# Patient Record
Sex: FEMALE | Race: WHITE | NOT HISPANIC OR LATINO | Employment: OTHER | URBAN - METROPOLITAN AREA
[De-identification: names, ages, dates, MRNs, and addresses within clinical notes are randomized per-mention and may not be internally consistent; named-entity substitution may affect disease eponyms.]

---

## 2020-07-11 ENCOUNTER — LAB REQUISITION (OUTPATIENT)
Dept: LAB | Facility: HOSPITAL | Age: 85
End: 2020-07-11

## 2020-07-11 DIAGNOSIS — Z00.00 ROUTINE GENERAL MEDICAL EXAMINATION AT A HEALTH CARE FACILITY: ICD-10-CM

## 2020-07-11 LAB
ANION GAP SERPL CALCULATED.3IONS-SCNC: 16 MMOL/L (ref 5–15)
BASOPHILS # BLD AUTO: 0.06 10*3/MM3 (ref 0–0.2)
BASOPHILS NFR BLD AUTO: 0.8 % (ref 0–1.5)
BUN SERPL-MCNC: 27 MG/DL (ref 8–23)
BUN/CREAT SERPL: 22.9 (ref 7–25)
CALCIUM SPEC-SCNC: 8.9 MG/DL (ref 8.2–9.6)
CHLORIDE SERPL-SCNC: 88 MMOL/L (ref 98–107)
CO2 SERPL-SCNC: 30 MMOL/L (ref 22–29)
CREAT SERPL-MCNC: 1.18 MG/DL (ref 0.57–1)
DEPRECATED RDW RBC AUTO: 48.5 FL (ref 37–54)
EOSINOPHIL # BLD AUTO: 0.27 10*3/MM3 (ref 0–0.4)
EOSINOPHIL NFR BLD AUTO: 3.5 % (ref 0.3–6.2)
ERYTHROCYTE [DISTWIDTH] IN BLOOD BY AUTOMATED COUNT: 17.6 % (ref 12.3–15.4)
GFR SERPL CREATININE-BSD FRML MDRD: 43 ML/MIN/1.73
GFR SERPL CREATININE-BSD FRML MDRD: 52 ML/MIN/1.73
GLUCOSE SERPL-MCNC: 118 MG/DL (ref 65–99)
HCT VFR BLD AUTO: 45.5 % (ref 34–46.6)
HGB BLD-MCNC: 13.8 G/DL (ref 12–15.9)
IMM GRANULOCYTES # BLD AUTO: 0.02 10*3/MM3 (ref 0–0.05)
IMM GRANULOCYTES NFR BLD AUTO: 0.3 % (ref 0–0.5)
LYMPHOCYTES # BLD AUTO: 1.14 10*3/MM3 (ref 0.7–3.1)
LYMPHOCYTES NFR BLD AUTO: 14.7 % (ref 19.6–45.3)
MCH RBC QN AUTO: 24.5 PG (ref 26.6–33)
MCHC RBC AUTO-ENTMCNC: 30.3 G/DL (ref 31.5–35.7)
MCV RBC AUTO: 80.8 FL (ref 79–97)
MONOCYTES # BLD AUTO: 0.75 10*3/MM3 (ref 0.1–0.9)
MONOCYTES NFR BLD AUTO: 9.7 % (ref 5–12)
NEUTROPHILS NFR BLD AUTO: 5.52 10*3/MM3 (ref 1.7–7)
NEUTROPHILS NFR BLD AUTO: 71 % (ref 42.7–76)
NRBC BLD AUTO-RTO: 0 /100 WBC (ref 0–0.2)
PLATELET # BLD AUTO: 107 10*3/MM3 (ref 140–450)
PMV BLD AUTO: 10.2 FL (ref 6–12)
POTASSIUM SERPL-SCNC: 3.7 MMOL/L (ref 3.5–5.2)
RBC # BLD AUTO: 5.63 10*6/MM3 (ref 3.77–5.28)
SODIUM SERPL-SCNC: 134 MMOL/L (ref 136–145)
WBC # BLD AUTO: 7.76 10*3/MM3 (ref 3.4–10.8)

## 2020-07-11 PROCEDURE — 85025 COMPLETE CBC W/AUTO DIFF WBC: CPT

## 2020-07-11 PROCEDURE — 80048 BASIC METABOLIC PNL TOTAL CA: CPT

## 2022-06-07 ENCOUNTER — HOSPITAL ENCOUNTER (EMERGENCY)
Facility: HOSPITAL | Age: 87
Discharge: HOME | End: 2022-06-08
Attending: EMERGENCY MEDICINE | Admitting: EMERGENCY MEDICINE

## 2022-06-07 ENCOUNTER — APPOINTMENT (OUTPATIENT)
Dept: GENERAL RADIOLOGY | Facility: HOSPITAL | Age: 87
End: 2022-06-07

## 2022-06-07 DIAGNOSIS — E87.70 HYPERVOLEMIA, UNSPECIFIED HYPERVOLEMIA TYPE: ICD-10-CM

## 2022-06-07 DIAGNOSIS — J40 BRONCHITIS: Primary | ICD-10-CM

## 2022-06-07 DIAGNOSIS — R91.1 PULMONARY NODULE: ICD-10-CM

## 2022-06-07 LAB
ALBUMIN SERPL-MCNC: 3.6 G/DL (ref 3.5–5.2)
ALBUMIN/GLOB SERPL: 0.9 G/DL
ALP SERPL-CCNC: 99 U/L (ref 39–117)
ALT SERPL W P-5'-P-CCNC: 10 U/L (ref 1–33)
ANION GAP SERPL CALCULATED.3IONS-SCNC: 8 MMOL/L (ref 5–15)
AST SERPL-CCNC: 22 U/L (ref 1–32)
BASOPHILS # BLD AUTO: 0.07 10*3/MM3 (ref 0–0.2)
BASOPHILS NFR BLD AUTO: 0.8 % (ref 0–1.5)
BILIRUB SERPL-MCNC: 0.7 MG/DL (ref 0–1.2)
BUN SERPL-MCNC: 32 MG/DL (ref 8–23)
BUN/CREAT SERPL: 29.1 (ref 7–25)
CALCIUM SPEC-SCNC: 9.5 MG/DL (ref 8.2–9.6)
CHLORIDE SERPL-SCNC: 91 MMOL/L (ref 98–107)
CO2 SERPL-SCNC: 33 MMOL/L (ref 22–29)
CREAT SERPL-MCNC: 1.1 MG/DL (ref 0.57–1)
DEPRECATED RDW RBC AUTO: 45.8 FL (ref 37–54)
EGFRCR SERPLBLD CKD-EPI 2021: 47.2 ML/MIN/1.73
EOSINOPHIL # BLD AUTO: 0.43 10*3/MM3 (ref 0–0.4)
EOSINOPHIL NFR BLD AUTO: 4.9 % (ref 0.3–6.2)
ERYTHROCYTE [DISTWIDTH] IN BLOOD BY AUTOMATED COUNT: 14.8 % (ref 12.3–15.4)
GLOBULIN UR ELPH-MCNC: 4 GM/DL
GLUCOSE SERPL-MCNC: 227 MG/DL (ref 65–99)
HCT VFR BLD AUTO: 43 % (ref 34–46.6)
HGB BLD-MCNC: 13.4 G/DL (ref 12–15.9)
HOLD SPECIMEN: NORMAL
HOLD SPECIMEN: NORMAL
IMM GRANULOCYTES # BLD AUTO: 0.02 10*3/MM3 (ref 0–0.05)
IMM GRANULOCYTES NFR BLD AUTO: 0.2 % (ref 0–0.5)
LYMPHOCYTES # BLD AUTO: 1.62 10*3/MM3 (ref 0.7–3.1)
LYMPHOCYTES NFR BLD AUTO: 18.5 % (ref 19.6–45.3)
MCH RBC QN AUTO: 26.1 PG (ref 26.6–33)
MCHC RBC AUTO-ENTMCNC: 31.2 G/DL (ref 31.5–35.7)
MCV RBC AUTO: 83.8 FL (ref 79–97)
MONOCYTES # BLD AUTO: 0.63 10*3/MM3 (ref 0.1–0.9)
MONOCYTES NFR BLD AUTO: 7.2 % (ref 5–12)
NEUTROPHILS NFR BLD AUTO: 6 10*3/MM3 (ref 1.7–7)
NEUTROPHILS NFR BLD AUTO: 68.4 % (ref 42.7–76)
NRBC BLD AUTO-RTO: 0 /100 WBC (ref 0–0.2)
NT-PROBNP SERPL-MCNC: 5565 PG/ML (ref 0–1800)
PLATELET # BLD AUTO: 142 10*3/MM3 (ref 140–450)
PMV BLD AUTO: 10.1 FL (ref 6–12)
POTASSIUM SERPL-SCNC: 4.2 MMOL/L (ref 3.5–5.2)
PROT SERPL-MCNC: 7.6 G/DL (ref 6–8.5)
RBC # BLD AUTO: 5.13 10*6/MM3 (ref 3.77–5.28)
SODIUM SERPL-SCNC: 132 MMOL/L (ref 136–145)
TROPONIN T SERPL-MCNC: <0.01 NG/ML (ref 0–0.03)
WBC NRBC COR # BLD: 8.77 10*3/MM3 (ref 3.4–10.8)
WHOLE BLOOD HOLD COAG: NORMAL
WHOLE BLOOD HOLD SPECIMEN: NORMAL

## 2022-06-07 PROCEDURE — 99284 EMERGENCY DEPT VISIT MOD MDM: CPT

## 2022-06-07 PROCEDURE — 71045 X-RAY EXAM CHEST 1 VIEW: CPT

## 2022-06-07 PROCEDURE — 84484 ASSAY OF TROPONIN QUANT: CPT | Performed by: EMERGENCY MEDICINE

## 2022-06-07 PROCEDURE — 85025 COMPLETE CBC W/AUTO DIFF WBC: CPT | Performed by: EMERGENCY MEDICINE

## 2022-06-07 PROCEDURE — 96375 TX/PRO/DX INJ NEW DRUG ADDON: CPT

## 2022-06-07 PROCEDURE — 83880 ASSAY OF NATRIURETIC PEPTIDE: CPT | Performed by: EMERGENCY MEDICINE

## 2022-06-07 PROCEDURE — 93005 ELECTROCARDIOGRAM TRACING: CPT | Performed by: EMERGENCY MEDICINE

## 2022-06-07 PROCEDURE — 80053 COMPREHEN METABOLIC PANEL: CPT | Performed by: EMERGENCY MEDICINE

## 2022-06-07 PROCEDURE — 96374 THER/PROPH/DIAG INJ IV PUSH: CPT

## 2022-06-07 RX ORDER — METHYLPREDNISOLONE SODIUM SUCCINATE 125 MG/2ML
125 INJECTION, POWDER, LYOPHILIZED, FOR SOLUTION INTRAMUSCULAR; INTRAVENOUS ONCE
Status: COMPLETED | OUTPATIENT
Start: 2022-06-07 | End: 2022-06-08

## 2022-06-07 RX ORDER — SODIUM CHLORIDE 0.9 % (FLUSH) 0.9 %
10 SYRINGE (ML) INJECTION AS NEEDED
Status: DISCONTINUED | OUTPATIENT
Start: 2022-06-07 | End: 2022-06-08 | Stop reason: HOSPADM

## 2022-06-07 RX ORDER — FUROSEMIDE 10 MG/ML
80 INJECTION INTRAMUSCULAR; INTRAVENOUS ONCE
Status: COMPLETED | OUTPATIENT
Start: 2022-06-07 | End: 2022-06-08

## 2022-06-08 ENCOUNTER — APPOINTMENT (OUTPATIENT)
Dept: CT IMAGING | Facility: HOSPITAL | Age: 87
End: 2022-06-08

## 2022-06-08 VITALS
RESPIRATION RATE: 19 BRPM | SYSTOLIC BLOOD PRESSURE: 138 MMHG | TEMPERATURE: 98.2 F | HEIGHT: 65 IN | WEIGHT: 180 LBS | HEART RATE: 101 BPM | BODY MASS INDEX: 29.99 KG/M2 | OXYGEN SATURATION: 96 % | DIASTOLIC BLOOD PRESSURE: 93 MMHG

## 2022-06-08 LAB
FLUAV RNA RESP QL NAA+PROBE: NOT DETECTED
FLUBV RNA RESP QL NAA+PROBE: NOT DETECTED
HOLD SPECIMEN: NORMAL
SARS-COV-2 RNA RESP QL NAA+PROBE: NOT DETECTED

## 2022-06-08 PROCEDURE — 96375 TX/PRO/DX INJ NEW DRUG ADDON: CPT

## 2022-06-08 PROCEDURE — 25010000002 METHYLPREDNISOLONE PER 125 MG: Performed by: EMERGENCY MEDICINE

## 2022-06-08 PROCEDURE — 0 IOPAMIDOL PER 1 ML: Performed by: EMERGENCY MEDICINE

## 2022-06-08 PROCEDURE — 25010000002 FUROSEMIDE PER 20 MG: Performed by: EMERGENCY MEDICINE

## 2022-06-08 PROCEDURE — 71275 CT ANGIOGRAPHY CHEST: CPT

## 2022-06-08 PROCEDURE — 96374 THER/PROPH/DIAG INJ IV PUSH: CPT

## 2022-06-08 PROCEDURE — 87636 SARSCOV2 & INF A&B AMP PRB: CPT | Performed by: EMERGENCY MEDICINE

## 2022-06-08 RX ORDER — BENZONATATE 100 MG/1
100 CAPSULE ORAL ONCE
Status: DISCONTINUED | OUTPATIENT
Start: 2022-06-08 | End: 2022-06-08 | Stop reason: HOSPADM

## 2022-06-08 RX ADMIN — FUROSEMIDE 80 MG: 10 INJECTION, SOLUTION INTRAMUSCULAR; INTRAVENOUS at 00:29

## 2022-06-08 RX ADMIN — METHYLPREDNISOLONE SODIUM SUCCINATE 125 MG: 125 INJECTION, POWDER, FOR SOLUTION INTRAMUSCULAR; INTRAVENOUS at 00:29

## 2022-06-08 RX ADMIN — IOPAMIDOL 80 ML: 755 INJECTION, SOLUTION INTRAVENOUS at 01:53

## 2022-06-08 NOTE — ED PROVIDER NOTES
Subjective   Shortness of breath and cough, lower extremity swelling, for some time, there was concern of DVT, with shortness of breath and cough concern for PE though she is anticoagulated.  She does not have a fever, complains of no chest pain, recurrent cough which is her chief complaint.          Review of Systems   Eyes: Negative.    Respiratory: Positive for cough. Negative for chest tightness.    Endocrine: Negative.    Neurological: Negative.    All other systems reviewed and are negative.      No past medical history on file.    Not on File    No past surgical history on file.    No family history on file.    Social History     Socioeconomic History   • Marital status:            Objective   Physical Exam  Constitutional:       Appearance: She is normal weight.   HENT:      Head: Normocephalic.   Eyes:      Extraocular Movements: Extraocular movements intact.   Neck:      Thyroid: No thyromegaly.      Vascular: No JVD.   Cardiovascular:      Rate and Rhythm: Tachycardia present. Rhythm irregular.   Pulmonary:      Effort: No respiratory distress.      Breath sounds: Examination of the right-upper field reveals wheezing. Examination of the left-upper field reveals wheezing. Wheezing present.   Abdominal:      Palpations: Abdomen is soft.   Musculoskeletal:         General: Normal range of motion.      Cervical back: Normal range of motion.   Skin:     General: Skin is warm.      Capillary Refill: Capillary refill takes less than 2 seconds.   Neurological:      General: No focal deficit present.      Mental Status: She is alert. She is disoriented.   Psychiatric:         Mood and Affect: Mood normal.         Procedures           ED Course           I did discuss the results and findings with Ms. Dalal, also indicated that we need to call report to [place], with findings.  At this time there is no unstable cardiopulmonary condition, I have treated her with IV diuretics, as well as a dose of steroids for  bronchitis, she has done well with this.  I did discuss with her the findings on CT scan including pulmonary nodules that need follow-up, and will include reports with her paperwork for [place] for the primary team.                                      MDM    Final diagnoses:   Bronchitis   Hypervolemia, unspecified hypervolemia type   Pulmonary nodule       ED Disposition  ED Disposition     ED Disposition   Discharge    Condition   Stable    Comment   --             Provider, No Known  [place] [id number]          Red Perez MD  [address] [facility] [address]  [place] [address]  [phone]               Medication List      No changes were made to your prescriptions during this visit.          Jadon Hernandez MD  06/08/22 7468

## 2022-06-17 LAB
QT INTERVAL: 358 MS
QTC INTERVAL: 473 MS

## 2022-08-02 ENCOUNTER — HOSPITAL ENCOUNTER (INPATIENT)
Facility: HOSPITAL | Age: 87
LOS: 7 days | Discharge: HOSPICE | End: 2022-08-09
Attending: EMERGENCY MEDICINE | Admitting: STUDENT IN AN ORGANIZED HEALTH CARE EDUCATION/TRAINING PROGRAM

## 2022-08-02 ENCOUNTER — APPOINTMENT (OUTPATIENT)
Dept: GENERAL RADIOLOGY | Facility: HOSPITAL | Age: 87
End: 2022-08-02

## 2022-08-02 ENCOUNTER — APPOINTMENT (OUTPATIENT)
Dept: CT IMAGING | Facility: HOSPITAL | Age: 87
End: 2022-08-02

## 2022-08-02 DIAGNOSIS — I50.33 ACUTE ON CHRONIC DIASTOLIC CONGESTIVE HEART FAILURE: ICD-10-CM

## 2022-08-02 DIAGNOSIS — I48.91 ATRIAL FIBRILLATION WITH RAPID VENTRICULAR RESPONSE: ICD-10-CM

## 2022-08-02 DIAGNOSIS — R65.20 SEPSIS WITH ACUTE HYPOXIC RESPIRATORY FAILURE WITHOUT SEPTIC SHOCK, DUE TO UNSPECIFIED ORGANISM: Primary | ICD-10-CM

## 2022-08-02 DIAGNOSIS — J96.01 SEPSIS WITH ACUTE HYPOXIC RESPIRATORY FAILURE WITHOUT SEPTIC SHOCK, DUE TO UNSPECIFIED ORGANISM: Primary | ICD-10-CM

## 2022-08-02 DIAGNOSIS — A41.9 SEPSIS WITH ACUTE HYPOXIC RESPIRATORY FAILURE WITHOUT SEPTIC SHOCK, DUE TO UNSPECIFIED ORGANISM: Primary | ICD-10-CM

## 2022-08-02 PROBLEM — I27.20 PULMONARY HYPERTENSION (HCC): Status: ACTIVE | Noted: 2022-08-02

## 2022-08-02 PROBLEM — E03.9 HYPOTHYROIDISM: Status: ACTIVE | Noted: 2022-08-02

## 2022-08-02 PROBLEM — R79.89 ELEVATED SERUM CREATININE: Status: ACTIVE | Noted: 2022-08-02

## 2022-08-02 PROBLEM — I50.30 (HFPEF) HEART FAILURE WITH PRESERVED EJECTION FRACTION (HCC): Status: ACTIVE | Noted: 2022-08-02

## 2022-08-02 PROBLEM — N18.2 CKD (CHRONIC KIDNEY DISEASE), STAGE II: Status: ACTIVE | Noted: 2022-08-02

## 2022-08-02 PROBLEM — I10 HTN (HYPERTENSION): Status: ACTIVE | Noted: 2022-08-02

## 2022-08-02 PROBLEM — I34.0 MITRAL VALVE REGURGITATION: Status: ACTIVE | Noted: 2022-08-02

## 2022-08-02 PROBLEM — E87.20 LACTIC ACIDOSIS: Status: ACTIVE | Noted: 2022-08-02

## 2022-08-02 PROBLEM — E11.9 T2DM (TYPE 2 DIABETES MELLITUS) (HCC): Status: ACTIVE | Noted: 2022-08-02

## 2022-08-02 PROBLEM — D69.6 THROMBOCYTOPENIA (HCC): Status: ACTIVE | Noted: 2022-08-02

## 2022-08-02 PROBLEM — E78.5 HLD (HYPERLIPIDEMIA): Status: ACTIVE | Noted: 2022-08-02

## 2022-08-02 PROBLEM — E83.42 HYPOMAGNESEMIA: Status: ACTIVE | Noted: 2022-08-02

## 2022-08-02 LAB
ALBUMIN SERPL-MCNC: 3.7 G/DL (ref 3.5–5.2)
ALBUMIN/GLOB SERPL: 0.8 G/DL
ALP SERPL-CCNC: 133 U/L (ref 39–117)
ALT SERPL W P-5'-P-CCNC: 13 U/L (ref 1–33)
AMORPH URATE CRY URNS QL MICRO: ABNORMAL /HPF
ANION GAP SERPL CALCULATED.3IONS-SCNC: 14 MMOL/L (ref 5–15)
ARTERIAL PATENCY WRIST A: ABNORMAL
AST SERPL-CCNC: 30 U/L (ref 1–32)
ATMOSPHERIC PRESS: ABNORMAL MM[HG]
B PARAPERT DNA SPEC QL NAA+PROBE: NOT DETECTED
B PERT DNA SPEC QL NAA+PROBE: NOT DETECTED
BACTERIA BLD CULT: ABNORMAL
BACTERIA UR QL AUTO: ABNORMAL /HPF
BASE EXCESS BLDA CALC-SCNC: 3.3 MMOL/L (ref 0–2)
BASOPHILS # BLD AUTO: 0.04 10*3/MM3 (ref 0–0.2)
BASOPHILS NFR BLD AUTO: 0.3 % (ref 0–1.5)
BDY SITE: ABNORMAL
BILIRUB SERPL-MCNC: 1.4 MG/DL (ref 0–1.2)
BILIRUB UR QL STRIP: NEGATIVE
BODY TEMPERATURE: 37 C
BUN SERPL-MCNC: 43 MG/DL (ref 8–23)
BUN/CREAT SERPL: 31.4 (ref 7–25)
C PNEUM DNA NPH QL NAA+NON-PROBE: NOT DETECTED
CALCIUM SPEC-SCNC: 8.6 MG/DL (ref 8.2–9.6)
CHLORIDE SERPL-SCNC: 92 MMOL/L (ref 98–107)
CLARITY UR: ABNORMAL
CO2 BLDA-SCNC: 29.7 MMOL/L (ref 22–33)
CO2 SERPL-SCNC: 27 MMOL/L (ref 22–29)
COHGB MFR BLD: 1.4 % (ref 0–2)
COLOR UR: YELLOW
CREAT SERPL-MCNC: 1.37 MG/DL (ref 0.57–1)
CRP SERPL-MCNC: 13.77 MG/DL (ref 0–0.5)
D-LACTATE SERPL-SCNC: 2.3 MMOL/L (ref 0.5–2)
D-LACTATE SERPL-SCNC: 2.9 MMOL/L (ref 0.5–2)
D-LACTATE SERPL-SCNC: 3.1 MMOL/L (ref 0.5–2)
D-LACTATE SERPL-SCNC: 3.4 MMOL/L (ref 0.5–2)
DEPRECATED RDW RBC AUTO: 48.1 FL (ref 37–54)
EGFRCR SERPLBLD CKD-EPI 2021: 36.3 ML/MIN/1.73
EOSINOPHIL # BLD AUTO: 0.02 10*3/MM3 (ref 0–0.4)
EOSINOPHIL NFR BLD AUTO: 0.1 % (ref 0.3–6.2)
EPAP: 0
ERYTHROCYTE [DISTWIDTH] IN BLOOD BY AUTOMATED COUNT: 15.3 % (ref 12.3–15.4)
FLUAV SUBTYP SPEC NAA+PROBE: NOT DETECTED
FLUBV RNA ISLT QL NAA+PROBE: NOT DETECTED
GLOBULIN UR ELPH-MCNC: 4.4 GM/DL
GLUCOSE BLDC GLUCOMTR-MCNC: 157 MG/DL (ref 70–130)
GLUCOSE BLDC GLUCOMTR-MCNC: 170 MG/DL (ref 70–130)
GLUCOSE SERPL-MCNC: 250 MG/DL (ref 65–99)
GLUCOSE UR STRIP-MCNC: NEGATIVE MG/DL
HADV DNA SPEC NAA+PROBE: NOT DETECTED
HCO3 BLDA-SCNC: 28.4 MMOL/L (ref 20–26)
HCOV 229E RNA SPEC QL NAA+PROBE: NOT DETECTED
HCOV HKU1 RNA SPEC QL NAA+PROBE: NOT DETECTED
HCOV NL63 RNA SPEC QL NAA+PROBE: NOT DETECTED
HCOV OC43 RNA SPEC QL NAA+PROBE: NOT DETECTED
HCT VFR BLD AUTO: 43.3 % (ref 34–46.6)
HCT VFR BLD CALC: 40.6 % (ref 38–51)
HGB BLD-MCNC: 13.5 G/DL (ref 12–15.9)
HGB BLDA-MCNC: 13.3 G/DL (ref 14–18)
HGB UR QL STRIP.AUTO: ABNORMAL
HMPV RNA NPH QL NAA+NON-PROBE: NOT DETECTED
HOLD SPECIMEN: NORMAL
HPIV1 RNA ISLT QL NAA+PROBE: NOT DETECTED
HPIV2 RNA SPEC QL NAA+PROBE: NOT DETECTED
HPIV3 RNA NPH QL NAA+PROBE: NOT DETECTED
HPIV4 P GENE NPH QL NAA+PROBE: NOT DETECTED
HYALINE CASTS UR QL AUTO: ABNORMAL /LPF
IMM GRANULOCYTES # BLD AUTO: 0.16 10*3/MM3 (ref 0–0.05)
IMM GRANULOCYTES NFR BLD AUTO: 1 % (ref 0–0.5)
INHALED O2 CONCENTRATION: 44 %
IPAP: 0
KETONES UR QL STRIP: ABNORMAL
LEUKOCYTE ESTERASE UR QL STRIP.AUTO: ABNORMAL
LIPASE SERPL-CCNC: 17 U/L (ref 13–60)
LYMPHOCYTES # BLD AUTO: 1.04 10*3/MM3 (ref 0.7–3.1)
LYMPHOCYTES NFR BLD AUTO: 6.5 % (ref 19.6–45.3)
M PNEUMO IGG SER IA-ACNC: NOT DETECTED
MAGNESIUM SERPL-MCNC: 1.5 MG/DL (ref 1.7–2.3)
MCH RBC QN AUTO: 26.7 PG (ref 26.6–33)
MCHC RBC AUTO-ENTMCNC: 31.2 G/DL (ref 31.5–35.7)
MCV RBC AUTO: 85.7 FL (ref 79–97)
METHGB BLD QL: ABNORMAL
MODALITY: ABNORMAL
MONOCYTES # BLD AUTO: 0.46 10*3/MM3 (ref 0.1–0.9)
MONOCYTES NFR BLD AUTO: 2.9 % (ref 5–12)
NEUTROPHILS NFR BLD AUTO: 14.26 10*3/MM3 (ref 1.7–7)
NEUTROPHILS NFR BLD AUTO: 89.2 % (ref 42.7–76)
NITRITE UR QL STRIP: NEGATIVE
NOTE: ABNORMAL
NRBC BLD AUTO-RTO: 0 /100 WBC (ref 0–0.2)
NT-PROBNP SERPL-MCNC: 9106 PG/ML (ref 0–1800)
OXYHGB MFR BLDV: 91.6 % (ref 94–99)
PAW @ PEAK INSP FLOW SETTING VENT: 0 CMH2O
PCO2 BLDA: 43.8 MM HG (ref 35–45)
PCO2 TEMP ADJ BLD: 43.8 MM HG (ref 35–45)
PH BLDA: 7.42 PH UNITS (ref 7.35–7.45)
PH UR STRIP.AUTO: 5.5 [PH] (ref 5–8)
PH, TEMP CORRECTED: 7.42 PH UNITS
PHOSPHATE SERPL-MCNC: 2.9 MG/DL (ref 2.5–4.5)
PLATELET # BLD AUTO: 137 10*3/MM3 (ref 140–450)
PMV BLD AUTO: 10.5 FL (ref 6–12)
PO2 BLDA: 63.8 MM HG (ref 83–108)
PO2 TEMP ADJ BLD: 63.8 MM HG (ref 83–108)
POTASSIUM SERPL-SCNC: 4.2 MMOL/L (ref 3.5–5.2)
PROCALCITONIN SERPL-MCNC: 4.82 NG/ML (ref 0–0.25)
PROT SERPL-MCNC: 8.1 G/DL (ref 6–8.5)
PROT UR QL STRIP: ABNORMAL
RBC # BLD AUTO: 5.05 10*6/MM3 (ref 3.77–5.28)
RBC # UR STRIP: ABNORMAL /HPF
REF LAB TEST METHOD: ABNORMAL
RENAL EPI CELLS #/AREA URNS HPF: ABNORMAL /HPF
RHINOVIRUS RNA SPEC NAA+PROBE: NOT DETECTED
RSV RNA NPH QL NAA+NON-PROBE: NOT DETECTED
SARS-COV-2 RNA NPH QL NAA+NON-PROBE: NOT DETECTED
SODIUM SERPL-SCNC: 133 MMOL/L (ref 136–145)
SP GR UR STRIP: 1.02 (ref 1–1.03)
SQUAMOUS #/AREA URNS HPF: ABNORMAL /HPF
T4 FREE SERPL-MCNC: 1.38 NG/DL (ref 0.93–1.7)
TOTAL RATE: 0 BREATHS/MINUTE
TRANS CELLS #/AREA URNS HPF: ABNORMAL /HPF
TROPONIN T SERPL-MCNC: 0.05 NG/ML (ref 0–0.03)
TSH SERPL DL<=0.05 MIU/L-ACNC: 5.41 UIU/ML (ref 0.27–4.2)
UROBILINOGEN UR QL STRIP: ABNORMAL
WBC # UR STRIP: ABNORMAL /HPF
WBC NRBC COR # BLD: 15.98 10*3/MM3 (ref 3.4–10.8)
WHOLE BLOOD HOLD COAG: NORMAL
WHOLE BLOOD HOLD SPECIMEN: NORMAL

## 2022-08-02 PROCEDURE — 0202U NFCT DS 22 TRGT SARS-COV-2: CPT | Performed by: STUDENT IN AN ORGANIZED HEALTH CARE EDUCATION/TRAINING PROGRAM

## 2022-08-02 PROCEDURE — 93005 ELECTROCARDIOGRAM TRACING: CPT

## 2022-08-02 PROCEDURE — 84443 ASSAY THYROID STIM HORMONE: CPT | Performed by: STUDENT IN AN ORGANIZED HEALTH CARE EDUCATION/TRAINING PROGRAM

## 2022-08-02 PROCEDURE — 36600 WITHDRAWAL OF ARTERIAL BLOOD: CPT

## 2022-08-02 PROCEDURE — 83880 ASSAY OF NATRIURETIC PEPTIDE: CPT | Performed by: EMERGENCY MEDICINE

## 2022-08-02 PROCEDURE — 84439 ASSAY OF FREE THYROXINE: CPT | Performed by: STUDENT IN AN ORGANIZED HEALTH CARE EDUCATION/TRAINING PROGRAM

## 2022-08-02 PROCEDURE — 63710000001 INSULIN DETEMIR PER 5 UNITS: Performed by: INTERNAL MEDICINE

## 2022-08-02 PROCEDURE — 99223 1ST HOSP IP/OBS HIGH 75: CPT | Performed by: INTERNAL MEDICINE

## 2022-08-02 PROCEDURE — 80053 COMPREHEN METABOLIC PANEL: CPT | Performed by: EMERGENCY MEDICINE

## 2022-08-02 PROCEDURE — 25010000002 FUROSEMIDE PER 20 MG: Performed by: EMERGENCY MEDICINE

## 2022-08-02 PROCEDURE — 82375 ASSAY CARBOXYHB QUANT: CPT

## 2022-08-02 PROCEDURE — 83605 ASSAY OF LACTIC ACID: CPT | Performed by: STUDENT IN AN ORGANIZED HEALTH CARE EDUCATION/TRAINING PROGRAM

## 2022-08-02 PROCEDURE — 87150 DNA/RNA AMPLIFIED PROBE: CPT | Performed by: STUDENT IN AN ORGANIZED HEALTH CARE EDUCATION/TRAINING PROGRAM

## 2022-08-02 PROCEDURE — 84145 PROCALCITONIN (PCT): CPT | Performed by: STUDENT IN AN ORGANIZED HEALTH CARE EDUCATION/TRAINING PROGRAM

## 2022-08-02 PROCEDURE — 25010000002 VANCOMYCIN 10 G RECONSTITUTED SOLUTION: Performed by: STUDENT IN AN ORGANIZED HEALTH CARE EDUCATION/TRAINING PROGRAM

## 2022-08-02 PROCEDURE — 63710000001 INSULIN LISPRO (HUMAN) PER 5 UNITS: Performed by: INTERNAL MEDICINE

## 2022-08-02 PROCEDURE — 83690 ASSAY OF LIPASE: CPT | Performed by: STUDENT IN AN ORGANIZED HEALTH CARE EDUCATION/TRAINING PROGRAM

## 2022-08-02 PROCEDURE — 87186 SC STD MICRODIL/AGAR DIL: CPT | Performed by: INTERNAL MEDICINE

## 2022-08-02 PROCEDURE — 85025 COMPLETE CBC W/AUTO DIFF WBC: CPT | Performed by: EMERGENCY MEDICINE

## 2022-08-02 PROCEDURE — 83735 ASSAY OF MAGNESIUM: CPT | Performed by: STUDENT IN AN ORGANIZED HEALTH CARE EDUCATION/TRAINING PROGRAM

## 2022-08-02 PROCEDURE — 87086 URINE CULTURE/COLONY COUNT: CPT | Performed by: INTERNAL MEDICINE

## 2022-08-02 PROCEDURE — 84100 ASSAY OF PHOSPHORUS: CPT | Performed by: STUDENT IN AN ORGANIZED HEALTH CARE EDUCATION/TRAINING PROGRAM

## 2022-08-02 PROCEDURE — 87077 CULTURE AEROBIC IDENTIFY: CPT | Performed by: INTERNAL MEDICINE

## 2022-08-02 PROCEDURE — 83050 HGB METHEMOGLOBIN QUAN: CPT

## 2022-08-02 PROCEDURE — 87186 SC STD MICRODIL/AGAR DIL: CPT | Performed by: STUDENT IN AN ORGANIZED HEALTH CARE EDUCATION/TRAINING PROGRAM

## 2022-08-02 PROCEDURE — 82805 BLOOD GASES W/O2 SATURATION: CPT

## 2022-08-02 PROCEDURE — 71045 X-RAY EXAM CHEST 1 VIEW: CPT

## 2022-08-02 PROCEDURE — 87040 BLOOD CULTURE FOR BACTERIA: CPT | Performed by: STUDENT IN AN ORGANIZED HEALTH CARE EDUCATION/TRAINING PROGRAM

## 2022-08-02 PROCEDURE — 82962 GLUCOSE BLOOD TEST: CPT

## 2022-08-02 PROCEDURE — 86140 C-REACTIVE PROTEIN: CPT | Performed by: STUDENT IN AN ORGANIZED HEALTH CARE EDUCATION/TRAINING PROGRAM

## 2022-08-02 PROCEDURE — 99285 EMERGENCY DEPT VISIT HI MDM: CPT

## 2022-08-02 PROCEDURE — 36415 COLL VENOUS BLD VENIPUNCTURE: CPT

## 2022-08-02 PROCEDURE — 25010000002 PIPERACILLIN SOD-TAZOBACTAM PER 1 G: Performed by: STUDENT IN AN ORGANIZED HEALTH CARE EDUCATION/TRAINING PROGRAM

## 2022-08-02 PROCEDURE — P9612 CATHETERIZE FOR URINE SPEC: HCPCS

## 2022-08-02 PROCEDURE — 84484 ASSAY OF TROPONIN QUANT: CPT | Performed by: EMERGENCY MEDICINE

## 2022-08-02 PROCEDURE — 71250 CT THORAX DX C-: CPT

## 2022-08-02 PROCEDURE — 25010000002 PIPERACILLIN SOD-TAZOBACTAM PER 1 G

## 2022-08-02 PROCEDURE — 81001 URINALYSIS AUTO W/SCOPE: CPT | Performed by: STUDENT IN AN ORGANIZED HEALTH CARE EDUCATION/TRAINING PROGRAM

## 2022-08-02 PROCEDURE — 93005 ELECTROCARDIOGRAM TRACING: CPT | Performed by: EMERGENCY MEDICINE

## 2022-08-02 PROCEDURE — 87147 CULTURE TYPE IMMUNOLOGIC: CPT | Performed by: STUDENT IN AN ORGANIZED HEALTH CARE EDUCATION/TRAINING PROGRAM

## 2022-08-02 RX ORDER — IPRATROPIUM BROMIDE AND ALBUTEROL SULFATE 2.5; .5 MG/3ML; MG/3ML
3 SOLUTION RESPIRATORY (INHALATION) EVERY 4 HOURS PRN
Status: DISCONTINUED | OUTPATIENT
Start: 2022-08-02 | End: 2022-08-09 | Stop reason: HOSPADM

## 2022-08-02 RX ORDER — FUROSEMIDE 10 MG/ML
80 INJECTION INTRAMUSCULAR; INTRAVENOUS ONCE
Status: COMPLETED | OUTPATIENT
Start: 2022-08-02 | End: 2022-08-02

## 2022-08-02 RX ORDER — GUAIFENESIN AND DEXTROMETHORPHAN HYDROBROMIDE 100; 10 MG/5ML; MG/5ML
5 SOLUTION ORAL EVERY 12 HOURS PRN
COMMUNITY
End: 2022-08-09 | Stop reason: HOSPADM

## 2022-08-02 RX ORDER — SODIUM CHLORIDE 0.9 % (FLUSH) 0.9 %
10 SYRINGE (ML) INJECTION EVERY 12 HOURS SCHEDULED
Status: DISCONTINUED | OUTPATIENT
Start: 2022-08-02 | End: 2022-08-09 | Stop reason: HOSPADM

## 2022-08-02 RX ORDER — SODIUM CHLORIDE 0.9 % (FLUSH) 0.9 %
1-10 SYRINGE (ML) INJECTION AS NEEDED
Status: DISCONTINUED | OUTPATIENT
Start: 2022-08-02 | End: 2022-08-09 | Stop reason: HOSPADM

## 2022-08-02 RX ORDER — DILTIAZEM HYDROCHLORIDE 5 MG/ML
10 INJECTION INTRAVENOUS ONCE
Status: COMPLETED | OUTPATIENT
Start: 2022-08-02 | End: 2022-08-02

## 2022-08-02 RX ORDER — PHENOL 1.4 %
10 AEROSOL, SPRAY (ML) MUCOUS MEMBRANE NIGHTLY
COMMUNITY

## 2022-08-02 RX ORDER — LEVOTHYROXINE SODIUM 88 UG/1
88 TABLET ORAL
Status: DISCONTINUED | OUTPATIENT
Start: 2022-08-02 | End: 2022-08-09 | Stop reason: HOSPADM

## 2022-08-02 RX ORDER — ACETAMINOPHEN 325 MG/1
650 TABLET ORAL EVERY 6 HOURS PRN
Status: DISCONTINUED | OUTPATIENT
Start: 2022-08-02 | End: 2022-08-09 | Stop reason: HOSPADM

## 2022-08-02 RX ORDER — ONDANSETRON 2 MG/ML
4 INJECTION INTRAMUSCULAR; INTRAVENOUS EVERY 6 HOURS PRN
Status: DISCONTINUED | OUTPATIENT
Start: 2022-08-02 | End: 2022-08-09 | Stop reason: HOSPADM

## 2022-08-02 RX ORDER — DEXTROSE MONOHYDRATE 25 G/50ML
25 INJECTION, SOLUTION INTRAVENOUS
Status: DISCONTINUED | OUTPATIENT
Start: 2022-08-02 | End: 2022-08-09 | Stop reason: HOSPADM

## 2022-08-02 RX ORDER — ACETAMINOPHEN 650 MG/1
650 SUPPOSITORY RECTAL ONCE
Status: COMPLETED | OUTPATIENT
Start: 2022-08-02 | End: 2022-08-02

## 2022-08-02 RX ORDER — HYDROCODONE BITARTRATE AND ACETAMINOPHEN 5; 325 MG/1; MG/1
1 TABLET ORAL EVERY 4 HOURS PRN
Status: ACTIVE | OUTPATIENT
Start: 2022-08-02 | End: 2022-08-09

## 2022-08-02 RX ORDER — LEVOTHYROXINE SODIUM 0.07 MG/1
75 TABLET ORAL
COMMUNITY
End: 2022-08-09 | Stop reason: HOSPADM

## 2022-08-02 RX ORDER — NYSTATIN 100000 [USP'U]/G
1 POWDER TOPICAL 2 TIMES DAILY
COMMUNITY

## 2022-08-02 RX ORDER — LORATADINE 10 MG/1
10 TABLET ORAL DAILY
COMMUNITY

## 2022-08-02 RX ORDER — INSULIN LISPRO 100 [IU]/ML
0-7 INJECTION, SOLUTION INTRAVENOUS; SUBCUTANEOUS
Status: DISCONTINUED | OUTPATIENT
Start: 2022-08-02 | End: 2022-08-09 | Stop reason: HOSPADM

## 2022-08-02 RX ORDER — LACTULOSE 10 G/15ML
20 SOLUTION ORAL 2 TIMES DAILY PRN
COMMUNITY
End: 2022-08-09 | Stop reason: HOSPADM

## 2022-08-02 RX ORDER — NYSTATIN 100000 [USP'U]/G
1 POWDER TOPICAL 2 TIMES DAILY
Status: DISCONTINUED | OUTPATIENT
Start: 2022-08-02 | End: 2022-08-09 | Stop reason: HOSPADM

## 2022-08-02 RX ORDER — MONTELUKAST SODIUM 10 MG/1
10 TABLET ORAL NIGHTLY
Status: DISCONTINUED | OUTPATIENT
Start: 2022-08-02 | End: 2022-08-09 | Stop reason: HOSPADM

## 2022-08-02 RX ORDER — NICOTINE POLACRILEX 4 MG
15 LOZENGE BUCCAL AS NEEDED
COMMUNITY

## 2022-08-02 RX ORDER — CETIRIZINE HYDROCHLORIDE 10 MG/1
10 TABLET ORAL DAILY
Status: DISCONTINUED | OUTPATIENT
Start: 2022-08-02 | End: 2022-08-09 | Stop reason: HOSPADM

## 2022-08-02 RX ORDER — SODIUM CHLORIDE 9 MG/ML
100 INJECTION, SOLUTION INTRAVENOUS CONTINUOUS
Status: ACTIVE | OUTPATIENT
Start: 2022-08-02 | End: 2022-08-03

## 2022-08-02 RX ORDER — IPRATROPIUM BROMIDE AND ALBUTEROL SULFATE 2.5; .5 MG/3ML; MG/3ML
3 SOLUTION RESPIRATORY (INHALATION) EVERY 4 HOURS PRN
COMMUNITY

## 2022-08-02 RX ORDER — TRAZODONE HYDROCHLORIDE 50 MG/1
50 TABLET ORAL NIGHTLY
COMMUNITY

## 2022-08-02 RX ORDER — LOPERAMIDE HYDROCHLORIDE 2 MG/1
2 CAPSULE ORAL 4 TIMES DAILY PRN
COMMUNITY
End: 2022-08-09 | Stop reason: HOSPADM

## 2022-08-02 RX ORDER — POTASSIUM CHLORIDE 750 MG/1
10 TABLET, EXTENDED RELEASE ORAL DAILY
COMMUNITY
End: 2022-08-09 | Stop reason: HOSPADM

## 2022-08-02 RX ORDER — ALBUTEROL SULFATE 2.5 MG/3ML
2.5 SOLUTION RESPIRATORY (INHALATION) ONCE
Status: COMPLETED | OUTPATIENT
Start: 2022-08-02 | End: 2022-08-02

## 2022-08-02 RX ORDER — ONDANSETRON 4 MG/1
4 TABLET, FILM COATED ORAL EVERY 6 HOURS PRN
Status: DISCONTINUED | OUTPATIENT
Start: 2022-08-02 | End: 2022-08-09 | Stop reason: HOSPADM

## 2022-08-02 RX ORDER — ONDANSETRON 4 MG/1
4 TABLET, FILM COATED ORAL EVERY 8 HOURS PRN
COMMUNITY

## 2022-08-02 RX ORDER — TRAZODONE HYDROCHLORIDE 50 MG/1
50 TABLET ORAL NIGHTLY
Status: DISCONTINUED | OUTPATIENT
Start: 2022-08-02 | End: 2022-08-09 | Stop reason: HOSPADM

## 2022-08-02 RX ORDER — MULTIPLE VITAMINS W/ MINERALS TAB 9MG-400MCG
1 TAB ORAL DAILY
COMMUNITY

## 2022-08-02 RX ORDER — MONTELUKAST SODIUM 10 MG/1
10 TABLET ORAL NIGHTLY
COMMUNITY

## 2022-08-02 RX ORDER — FUROSEMIDE 20 MG/1
20 TABLET ORAL DAILY
COMMUNITY
End: 2022-08-09 | Stop reason: HOSPADM

## 2022-08-02 RX ORDER — CHOLECALCIFEROL (VITAMIN D3) 125 MCG
10 CAPSULE ORAL NIGHTLY
Status: DISCONTINUED | OUTPATIENT
Start: 2022-08-02 | End: 2022-08-09 | Stop reason: HOSPADM

## 2022-08-02 RX ORDER — ACETAMINOPHEN 325 MG/1
650 TABLET ORAL EVERY 6 HOURS PRN
COMMUNITY

## 2022-08-02 RX ORDER — BUMETANIDE 2 MG/1
2 TABLET ORAL DAILY
COMMUNITY

## 2022-08-02 RX ORDER — SODIUM CHLORIDE 0.9 % (FLUSH) 0.9 %
10 SYRINGE (ML) INJECTION AS NEEDED
Status: DISCONTINUED | OUTPATIENT
Start: 2022-08-02 | End: 2022-08-09 | Stop reason: HOSPADM

## 2022-08-02 RX ORDER — NICOTINE POLACRILEX 4 MG
15 LOZENGE BUCCAL
Status: DISCONTINUED | OUTPATIENT
Start: 2022-08-02 | End: 2022-08-09 | Stop reason: HOSPADM

## 2022-08-02 RX ORDER — DILTIAZEM HCL IN NACL,ISO-OSM 125 MG/125
5-15 PLASTIC BAG, INJECTION (ML) INTRAVENOUS
Status: DISCONTINUED | OUTPATIENT
Start: 2022-08-02 | End: 2022-08-09 | Stop reason: HOSPADM

## 2022-08-02 RX ADMIN — TAZOBACTAM SODIUM AND PIPERACILLIN SODIUM 3.38 G: 375; 3 INJECTION, SOLUTION INTRAVENOUS at 15:04

## 2022-08-02 RX ADMIN — LEVOTHYROXINE SODIUM 88 MCG: 88 TABLET ORAL at 15:04

## 2022-08-02 RX ADMIN — Medication 5 MG/HR: at 07:41

## 2022-08-02 RX ADMIN — SODIUM CHLORIDE 250 ML: 9 INJECTION, SOLUTION INTRAVENOUS at 06:10

## 2022-08-02 RX ADMIN — ALBUTEROL SULFATE 2.5 MG: 2.5 SOLUTION RESPIRATORY (INHALATION) at 08:58

## 2022-08-02 RX ADMIN — DILTIAZEM HYDROCHLORIDE 10 MG: 5 INJECTION INTRAVENOUS at 07:30

## 2022-08-02 RX ADMIN — CETIRIZINE HYDROCHLORIDE TABLETS 10 MG: 10 TABLET, FILM COATED ORAL at 15:04

## 2022-08-02 RX ADMIN — SODIUM CHLORIDE 500 ML: 9 INJECTION, SOLUTION INTRAVENOUS at 09:02

## 2022-08-02 RX ADMIN — Medication 10 MG: at 20:36

## 2022-08-02 RX ADMIN — INSULIN DETEMIR 10 UNITS: 100 INJECTION, SOLUTION SUBCUTANEOUS at 21:25

## 2022-08-02 RX ADMIN — NYSTATIN 1 APPLICATION: 100000 POWDER TOPICAL at 20:40

## 2022-08-02 RX ADMIN — DILTIAZEM HYDROCHLORIDE 10 MG: 5 INJECTION INTRAVENOUS at 06:21

## 2022-08-02 RX ADMIN — RIVAROXABAN 15 MG: 15 TABLET, FILM COATED ORAL at 17:13

## 2022-08-02 RX ADMIN — METOPROLOL TARTRATE 25 MG: 25 TABLET, FILM COATED ORAL at 20:36

## 2022-08-02 RX ADMIN — MONTELUKAST 10 MG: 10 TABLET, FILM COATED ORAL at 20:36

## 2022-08-02 RX ADMIN — TRAZODONE HYDROCHLORIDE 50 MG: 50 TABLET ORAL at 20:36

## 2022-08-02 RX ADMIN — ACETAMINOPHEN 650 MG: 650 SUPPOSITORY RECTAL at 06:41

## 2022-08-02 RX ADMIN — SODIUM CHLORIDE 500 ML: 9 INJECTION, SOLUTION INTRAVENOUS at 12:05

## 2022-08-02 RX ADMIN — TAZOBACTAM SODIUM AND PIPERACILLIN SODIUM 3.38 G: 375; 3 INJECTION, SOLUTION INTRAVENOUS at 06:51

## 2022-08-02 RX ADMIN — INSULIN LISPRO 2 UNITS: 100 INJECTION, SOLUTION INTRAVENOUS; SUBCUTANEOUS at 17:13

## 2022-08-02 RX ADMIN — SODIUM CHLORIDE 100 ML/HR: 9 INJECTION, SOLUTION INTRAVENOUS at 15:03

## 2022-08-02 RX ADMIN — VANCOMYCIN HYDROCHLORIDE 1750 MG: 10 INJECTION, POWDER, LYOPHILIZED, FOR SOLUTION INTRAVENOUS at 07:23

## 2022-08-02 RX ADMIN — FUROSEMIDE 80 MG: 10 INJECTION, SOLUTION INTRAMUSCULAR; INTRAVENOUS at 07:29

## 2022-08-02 RX ADMIN — SODIUM CHLORIDE 500 ML: 9 INJECTION, SOLUTION INTRAVENOUS at 22:43

## 2022-08-02 NOTE — H&P
[facility] Medicine Services  HISTORY AND PHYSICAL    Patient Name: Rosanna Browne  : 1930  MRN: [id number]  Primary Care Physician: Red Perez MD  Date of admission: 2022      Subjective   Subjective     Chief Complaint:  Sepsis    HPI:  Rosanna Browne is a 89 y.o. female with PMH of HTN, HLD, T2DM on insulin, Hypothyroidism, Severe Pulmonary HTN, MVR, HFpEF, Afib on Xarelto and CKD II presented from [facility] with severe sepsis. Pt reports that she has had a GI bug the last few days with multiple episodes of emesis. She denies any diarrhea.  Per pt and her daughter, the entire facility has had this GIB in the last few days. Pt reports that she typically wears O2 at baseline (she's unsure of how much) but states that she felt more SOA this am.  Pt denies having had a fever prior to today.        Review of Systems   Gen- No fevers, chills  CV- No chest pain, palpitations  Resp- No cough, + dyspnea  GI-as above      All other systems reviewed and are negative.     Personal History     Past Medical History:   Diagnosis Date   • A-fib (HCC)    • CHF (congestive heart failure) (HCC)    • CKD (chronic kidney disease), stage II    • Diabetes (HCC)    • HLD (hyperlipidemia)    • Hypertension    • Pulmonary hypertension (HCC)    • Thyroid disease              Past Surgical History:   Procedure Laterality Date   • CHOLECYSTECTOMY     • HIP ARTHROPLASTY     • TUBAL ABDOMINAL LIGATION         Family History: No significant family history. Otherwise pertinent FHx was reviewed and unremarkable.     Social History:  reports that she has quit smoking. Her smoking use included cigarettes. She has never used smokeless tobacco. She reports that she does not drink alcohol and does not use drugs.  Social History     Social History Narrative   • Not on file       Medications:  Available home medication information reviewed.  (Not in a hospital admission)    No current  facility-administered medications on file prior to encounter.     Current Outpatient Medications on File Prior to Encounter   Medication Sig Dispense Refill   • acetaminophen (TYLENOL) 325 MG tablet Take 650 mg by mouth Every 6 (Six) Hours As Needed for Mild Pain , Headache or Fever.     • bumetanide (BUMEX) 2 MG tablet Take 2 mg by mouth Daily.     • Calcium Carb-Cholecalciferol 600-500 MG-UNIT tablet Take 1 tablet by mouth Daily.     • dextromethorphan-guaifenesin (Robafen DM)  MG/5ML syrup Take 5 mL by mouth Every 12 (Twelve) Hours As Needed.     • furosemide (LASIX) 20 MG tablet Take 20 mg by mouth Daily.     • Insulin Glargine (LANTUS SOLOSTAR) 100 UNIT/ML injection pen Inject 25 Units under the skin into the appropriate area as directed Every Night.     • ipratropium-albuterol (DUO-NEB) 0.5-2.5 mg/3 ml nebulizer Take 3 mL by nebulization Every 4 (Four) Hours As Needed for Wheezing or Shortness of Air.     • lactulose (CHRONULAC) 10 GM/15ML solution Take 20 g by mouth 2 (Two) Times a Day As Needed.     • levothyroxine (SYNTHROID, LEVOTHROID) 75 MCG tablet Take 75 mcg by mouth Every Morning.     • loperamide (IMODIUM) 2 MG capsule Take 2 mg by mouth 4 (Four) Times a Day As Needed for Diarrhea.     • loratadine (CLARITIN) 10 MG tablet Take 10 mg by mouth Daily.     • Melatonin 10 MG tablet Take 10 mg by mouth Every Night.     • metoprolol tartrate (LOPRESSOR) 25 MG tablet Take 25 mg by mouth 2 (Two) Times a Day.     • montelukast (SINGULAIR) 10 MG tablet Take 10 mg by mouth Every Night.     • multivitamin with minerals (Tab-A-Mike/Iron/Beta Carotene) tablet tablet Take 1 tablet by mouth Daily.     • nystatin (MYCOSTATIN) 613902 UNIT/GM powder Apply 1 application topically to the appropriate area as directed 2 (Two) Times a Day.     • ondansetron (ZOFRAN) 4 MG tablet Take 4 mg by mouth Every 8 (Eight) Hours As Needed for Nausea or Vomiting.     • potassium chloride (K-DUR,KLOR-CON) 10 MEQ CR tablet Take 10  mEq by mouth Daily.     • rivaroxaban (XARELTO) 20 MG tablet Take 20 mg by mouth Daily With Dinner.     • traZODone (DESYREL) 50 MG tablet Take 50 mg by mouth Every Night.     • dextrose (GLUTOSE) 40 % gel Take 15 g by mouth As Needed for Low Blood Sugar.           Allergies   Allergen Reactions   • Lisinopril Unknown - High Severity       Objective   Objective     Vital Signs:   Temp:  [102 °F (38.9 °C)] 102 °F (38.9 °C)  Heart Rate:  [111-200] 111  Resp:  [40] 40  BP: (101-181)/() 101/52  Flow (L/min):  [4] 4       Physical Exam   Constitutional: Awake, alert  Eyes: PERRLA, sclerae anicteric, no conjunctival injection  HENT: NCAT, mucous membranes dry  Neck: Supple, no thyromegaly, no lymphadenopathy, trachea midline  Respiratory: rhonchi on left, right clear to anterior exam only (pt unable to lift herself for posterior auscultation)  Cardiovascular: irregularly irregular with rates in the low 100s, no murmurs, rubs, or gallops, palpable pedal pulses bilaterally  Gastrointestinal: Positive bowel sounds, soft, nontender, nondistended  Musculoskeletal: No bilateral ankle edema, no clubbing or cyanosis to extremities  Psychiatric: Appropriate affect, cooperative  Neurologic: Oriented x 3, strength symmetric in all extremities, Cranial Nerves grossly intact to confrontation, speech clear  Skin: No rashes      Result Review:  I have personally reviewed the results from the time of this admission to 8/2/2022 08:15 EDT and agree with these findings:  []  Laboratory list / accordion  [x]  Microbiology  [x]  Radiology  []  EKG/Telemetry   []  Cardiology/Vascular   []  Pathology  [x]  Old records  []  Other:  Most notable findings include: see assessment and plan below      LAB RESULTS:      Lab 08/02/22  0602   WBC 15.98*   HEMOGLOBIN 13.5   HEMATOCRIT 43.3   PLATELETS 137*   NEUTROS ABS 14.26*   IMMATURE GRANS (ABS) 0.16*   LYMPHS ABS 1.04   MONOS ABS 0.46   EOS ABS 0.02   MCV 85.7   CRP 13.77*   PROCALCITONIN  4.82*   LACTATE 2.9*         Lab 08/02/22  0602   SODIUM 133*   POTASSIUM 4.2   CHLORIDE 92*   CO2 27.0   ANION GAP 14.0   BUN 43*   CREATININE 1.37*   EGFR 36.3*   GLUCOSE 250*   CALCIUM 8.6   MAGNESIUM 1.5*   PHOSPHORUS 2.9   TSH 5.410*         Lab 08/02/22  0602   TOTAL PROTEIN 8.1   ALBUMIN 3.70   GLOBULIN 4.4   ALT (SGPT) 13   AST (SGOT) 30   BILIRUBIN 1.4*   ALK PHOS 133*   LIPASE 17         Lab 08/02/22  0602   PROBNP 9,106.0*   TROPONIN T 0.054*                 Lab 08/02/22  0704   PH, ARTERIAL 7.420   PCO2, ARTERIAL 43.8   PO2 ART 63.8*   FIO2 44   HCO3 ART 28.4*   BASE EXCESS ART 3.3*   CARBOXYHEMOGLOBIN 1.4     UA    Urinalysis 8/2/22 8/2/22    0629 0629   Squamous Epithelial Cells, UA  3-6 (A)   Specific Gravity, UA 1.016    Ketones, UA Trace (A)    Blood, UA Moderate (2+) (A)    Leukocytes, UA Moderate (2+) (A)    Nitrite, UA Negative    RBC, UA  None Seen   WBC, UA  3-5 (A)   Bacteria, UA  Trace   (A) Abnormal value              Microbiology Results (last 10 days)     Procedure Component Value - Date/Time    COVID PRE-OP / PRE-PROCEDURE SCREENING ORDER (NO ISOLATION) - Swab, Nasopharynx [[id number]]  (Normal) Collected: 08/02/22 0607    Lab Status: Final result Specimen: Swab from Nasopharynx Updated: 08/02/22 0721    Narrative:      The following orders were created for panel order COVID PRE-OP / PRE-PROCEDURE SCREENING ORDER (NO ISOLATION) - Swab, Nasopharynx.  Procedure                               Abnormality         Status                     ---------                               -----------         ------                     Respiratory Panel PCR w/...[[id number]]  Normal              Final result                 Please view results for these tests on the individual orders.    Respiratory Panel PCR w/COVID-19(SARS-CoV-2) [facility]/ELLA/HAILEE/PAD/COR/MAD/DEANN In-House, NP Swab in UTM/[facility], 3-4 HR TAT - Swab, Nasopharynx [[id number]]  (Normal) Collected: 08/02/22 0607    Lab Status: Final result Specimen:  Swab from Nasopharynx Updated: 08/02/22 0721     ADENOVIRUS, PCR Not Detected     Coronavirus 229E Not Detected     Coronavirus HKU1 Not Detected     Coronavirus NL63 Not Detected     Coronavirus OC43 Not Detected     COVID19 Not Detected     Human Metapneumovirus Not Detected     Human Rhinovirus/Enterovirus Not Detected     Influenza A PCR Not Detected     Influenza B PCR Not Detected     Parainfluenza Virus 1 Not Detected     Parainfluenza Virus 2 Not Detected     Parainfluenza Virus 3 Not Detected     Parainfluenza Virus 4 Not Detected     RSV, PCR Not Detected     Bordetella pertussis pcr Not Detected     Bordetella parapertussis PCR Not Detected     Chlamydophila pneumoniae PCR Not Detected     Mycoplasma pneumo by PCR Not Detected    Narrative:      In the setting of a positive respiratory panel with a viral infection PLUS a negative procalcitonin without other underlying concern for bacterial infection, consider observing off antibiotics or discontinuation of antibiotics and continue supportive care. If the respiratory panel is positive for atypical bacterial infection (Bordetella pertussis, Chlamydophila pneumoniae, or Mycoplasma pneumoniae), consider antibiotic de-escalation to target atypical bacterial infection.          XR Chest 1 View    Result Date: 8/2/2022  EXAMINATION: XR CHEST 1 VW DATE: 8/2/2022 6:02 AM INDICATION:  Dyspnea; COMPARISON:  June 7, 2022 FINDINGS: Defibrillator pads overlying left chest limiting evaluation. No focal consolidation, pleural effusion, or pneumothorax. Cardiomediastinal silhouette  without definite interval change. No acute osseous abnormality.     Impression: 1.  No acute cardiopulmonary process. Electronically signed by:  Gabrielle Weir M.D.  8/2/2022 5:04 AM Mountain Time          Assessment & Plan   Assessment & Plan     Active Hospital Problems    Diagnosis  POA   • Sepsis with acute hypoxic respiratory failure without septic shock, due to unspecified organism  (HCC) [A41.9, R65.20, J96.01]  Yes     Priority: High   • A-fib (HCC) [I48.91]  Unknown     Priority: High   • Elevated serum creatinine [R79.89]  Unknown     Priority: Medium   • Lactic acidosis [E87.2]  Unknown     Priority: Medium   • (HFpEF) heart failure with preserved ejection fraction (HCC) [I50.30]  Unknown     Priority: Medium   • Pulmonary hypertension (HCC) [I27.20]  Unknown     Priority: Medium   • Thrombocytopenia (HCC) [D69.6]  Unknown     Priority: Low   • Hypomagnesemia [E83.42]  Unknown     Priority: Low   • HTN (hypertension) [I10]  Unknown     Priority: Low   • HLD (hyperlipidemia) [E78.5]  Unknown     Priority: Low   • Hypothyroidism [E03.9]  Unknown     Priority: Low   • CKD (chronic kidney disease), stage II [N18.2]  Unknown     Priority: Low   • Mitral valve regurgitation [I34.0]  Unknown     Priority: Low   • T2DM (type 2 diabetes mellitus) (HCC) [E11.9]  Unknown     Priority: Low       Ms. Browne is a 89 yo F with PMH of HTN, HLD, T2DM on insulin, Hypothyroidism, Severe Pulmonary HTN, MVR, HFpEF, Afib on Xarelto and CKD II presented from [facility] with severe sepsis. Pt also noted to be hypoxic and in Afib with RVR.    Plan:    Severe Sepsis  -- febrile with leukocytosis and elevated lactate  -- procal elevated  -- source as of yet unclear, however, suspect urinary given UA with moderate LE, 3-5 WBCs and trace bacteria.    -- could also be respiratory, however, CXR unremarkable  -- get CT chest without contrast  -- RVP (including COVID19) negative  -- blood and UCx PENDING  -- continue Vanc/Zosyn  -- gentle IVFs for 10 hours (given dose of IV Lasix in the ED)  -- repeat lactate as per protocol  -- recent GI symptoms and reported GI illness at her facility.  Will check GI PCR.     Acute hypoxic respiratory failure  H/o Severe Pulmonary HTN  H/o HFpEF  -- pt with noted hypoxia on RA at NH.  Wears supplemental O2 continuously at baseline but unsure of how many liters  --  currently on NRB  -- suspect due aspiration from recent episodes of emesis vs flash pulm edema due to Afib with RVR. CXR does not show pulmonary edema  -- given IV Lasix in the ED, defer further diuresis for now given borderline hypotensive. Give IVFs for 10 hours. Monitor closely  -- get TTE to assess for EF  -- will do duonebs for now  -- get CT chest    Afib with RVR  -- [id number] of 5, on Xarelto at home  -- continue Metoprolol succinate as BP tolerates (will need accurate med list from NH)  -- currently on dilt gtt    T2DM  -- hold home meds  -- check HbA1C in am  -- SSI with low dose basal insulin for now    HTN  -- hold antihypertensives for now as borderline hypotensive, will resume Metoprolol for rate control if BP can tolerate    HLD  -- continue statin once have med list    CKD II  -- unsure of baseline Cr, Cr currently 1.37  -- monitor/renally dose meds    Hypomagnesemia  -- replace per protocol    TCP  -- mild, monitor. Likely due to sepsis?    Hypothyroidism  -- TSH elevated  -- will increase home dose Synthroid from 75 mcg to 88mcg.  Needs repeat TSH in 4-6 weeks      DVT prophylaxis:  Anticoagulated with Xarelto      CODE STATUS:  DNR/DNI. Confirmed with both the patient and her daughter via telephone.   Code Status and Medical Interventions:   Ordered at: 08/02/22 1206     Medical Intervention Limits:    NO intubation (DNI)    NO cardioversion    NO dialysis    NO vasopressors    NO artificial nutrition     Level Of Support Discussed With:    Patient     Code Status (Patient has no pulse and is not breathing):    No CPR (Do Not Attempt to Resuscitate)     Medical Interventions (Patient has pulse or is breathing):    Limited Support         Maria C Andersen MD  08/02/22

## 2022-08-02 NOTE — PROGRESS NOTES
"Pharmacy Consult-Vancomycin Dosing  Rosanna Browne is a  89 y.o. female receiving vancomycin therapy.     Indication: Sepsis  Consulting Provider: Hospitalist  ID Consult: No    Goal Trough: 15-20 mcg/mL    Current Antimicrobial Therapy  Anti-Infectives (From admission, onward)      Ordered     Dose/Rate Route Frequency Start Stop    08/02/22 1416  piperacillin-tazobactam (ZOSYN) 3.375 g in iso-osmotic dextrose 50 ml (premix)        Ordering Provider: Misty Singh PharmD    3.375 g  over 4 Hours Intravenous Every 8 Hours Scheduled 08/02/22 1515 08/09/22 1559    08/02/22 1341  Pharmacy to dose vancomycin        Ordering Provider: Maria C Andersen MD     Does not apply Continuous PRN 08/02/22 1341 08/09/22 1340    08/02/22 0606  vancomycin 1750 mg/500 mL 0.9% NS IVPB ([facility])        Ordering Provider: Luis Fernando Serrano MD    20 mg/kg × 83.9 kg  over 105 Minutes Intravenous Once 08/02/22 0608 08/02/22 0914    08/02/22 0606  piperacillin-tazobactam (ZOSYN) 3.375 g in iso-osmotic dextrose 50 ml (premix)        Ordering Provider: Luis Fernando Serrano MD    3.375 g  over 30 Minutes Intravenous Once 08/02/22 0608 08/02/22 0723          Allergies  Allergies as of 08/02/2022 - Reviewed 08/02/2022   Allergen Reaction Noted    Lisinopril Unknown - High Severity 08/02/2022     Labs    Results from last 7 days   Lab Units 08/02/22  0602   BUN mg/dL 43*   CREATININE mg/dL 1.37*     Results from last 7 days   Lab Units 08/02/22  0602   WBC 10*3/mm3 15.98*     Evaluation of Dosing     Is Patient on Dialysis or Renal Replacement: No (LEAH on admission)    Ht - 167.6 cm (66\")  Wt - 83.9 kg (185 lb)    Estimated Creatinine Clearance: 28.6 mL/min (A) (by C-G formula based on SCr of 1.37 mg/dL (H)).    Intake & Output (last 3 days)         07/30 0701 07/31 0700 07/31 0701  08/01 0700 08/01 0701 08/02 0700 08/02 0701 08/03 0700    IV Piggyback   250 1000    Total Intake(mL/kg)   250 (3) 1000 (11.9)    Net   +250 " +1000                  Microbiology and Radiology  Microbiology Results (last 10 days)       Procedure Component Value - Date/Time    COVID PRE-OP / PRE-PROCEDURE SCREENING ORDER (NO ISOLATION) - Swab, Nasopharynx [[id number]]  (Normal) Collected: 08/02/22 0607    Lab Status: Final result Specimen: Swab from Nasopharynx Updated: 08/02/22 0721    Narrative:      The following orders were created for panel order COVID PRE-OP / PRE-PROCEDURE SCREENING ORDER (NO ISOLATION) - Swab, Nasopharynx.  Procedure                               Abnormality         Status                     ---------                               -----------         ------                     Respiratory Panel PCR w/...[[id number]]  Normal              Final result                 Please view results for these tests on the individual orders.    Respiratory Panel PCR w/COVID-19(SARS-CoV-2) [facility]/ELLA/HAILEE/PAD/COR/MAD/DEANN In-House, NP Swab in UTM/VTM, 3-4 HR TAT - Swab, Nasopharynx [[id number]]  (Normal) Collected: 08/02/22 0607    Lab Status: Final result Specimen: Swab from Nasopharynx Updated: 08/02/22 0721     ADENOVIRUS, PCR Not Detected     Coronavirus 229E Not Detected     Coronavirus HKU1 Not Detected     Coronavirus NL63 Not Detected     Coronavirus OC43 Not Detected     COVID19 Not Detected     Human Metapneumovirus Not Detected     Human Rhinovirus/Enterovirus Not Detected     Influenza A PCR Not Detected     Influenza B PCR Not Detected     Parainfluenza Virus 1 Not Detected     Parainfluenza Virus 2 Not Detected     Parainfluenza Virus 3 Not Detected     Parainfluenza Virus 4 Not Detected     RSV, PCR Not Detected     Bordetella pertussis pcr Not Detected     Bordetella parapertussis PCR Not Detected     Chlamydophila pneumoniae PCR Not Detected     Mycoplasma pneumo by PCR Not Detected    Narrative:      In the setting of a positive respiratory panel with a viral infection PLUS a negative procalcitonin without other underlying concern for  bacterial infection, consider observing off antibiotics or discontinuation of antibiotics and continue supportive care. If the respiratory panel is positive for atypical bacterial infection (Bordetella pertussis, Chlamydophila pneumoniae, or Mycoplasma pneumoniae), consider antibiotic de-escalation to target atypical bacterial infection.          Vancomycin Levels:              Assessment/Plan:    Pharmacy to dose vancomycin for sepsis.  Patient received a loading dose of vancomycin 1750 mg IV x 1. Due to advanced age and LEAH on admission, will dose further per levels.   Vanc AM random 8/3 to assess need for further doses.  Monitor renal function, cultures and sensitivities, and clinical status, and adjust regimen as necessary.  Pharmacy will continue to follow.    Misty Jha, PharmD, BCPS  8/2/2022  14:17 EDT

## 2022-08-02 NOTE — ED PROVIDER NOTES
Subjective   Mrs Browne is sent by ambulance from [facility] with respiratory distress.  EMS reports that staff told them they were doing morning rounds and found her in respiratory distress.  She is not able to provide any additional history and no additional history has been given up to this point.  EMS noted rapid atrial fibrillation and expiratory wheezing and gave nebulized albuterol.  Mrs. Browne tells me that she did not want to be here and that staff made her come.  She denies pain anywhere.      History provided by:  Patient and nursing home  Shortness of Breath  Severity:  Severe  Onset quality:  Gradual  Timing:  Constant  Progression:  Worsening  Chronicity:  New  Context comment:  Accompanied by fever  Relieved by:  Nothing  Worsened by:  Nothing  Ineffective treatments:  None tried  Associated symptoms: fever and wheezing    Associated symptoms: no abdominal pain, no chest pain, no cough, no headaches and no vomiting        Review of Systems   Constitutional: Positive for fever.   HENT: Negative for congestion and rhinorrhea.    Respiratory: Positive for shortness of breath and wheezing. Negative for cough.    Cardiovascular: Negative for chest pain.   Gastrointestinal: Positive for nausea. Negative for abdominal pain and vomiting.   Genitourinary: Negative for difficulty urinating and dysuria.   Musculoskeletal: Negative for back pain.   Neurological: Positive for weakness. Negative for headaches.   All other systems reviewed and are negative.      Past Medical History:   Diagnosis Date   • A-fib (HCC)    • CHF (congestive heart failure) (HCC)    • CKD (chronic kidney disease), stage II    • Diabetes (HCC)    • HLD (hyperlipidemia)    • Hypertension    • Pulmonary hypertension (HCC)    • Thyroid disease        Allergies   Allergen Reactions   • Lisinopril Unknown - High Severity       Past Surgical History:   Procedure Laterality Date   • CHOLECYSTECTOMY     • HIP  ARTHROPLASTY     • TUBAL ABDOMINAL LIGATION         History reviewed. No pertinent family history.    Social History     Socioeconomic History   • Marital status:    Tobacco Use   • Smoking status: Former Smoker     Types: Cigarettes   • Smokeless tobacco: Never Used   Substance and Sexual Activity   • Alcohol use: Never   • Drug use: Never           Objective   Physical Exam  Vitals and nursing note reviewed.   Constitutional:       General: She is in acute distress.      Appearance: She is well-developed. She is ill-appearing.   HENT:      Head: Normocephalic and atraumatic.      Mouth/Throat:      Mouth: Mucous membranes are moist.   Neck:      Vascular: No JVD.   Cardiovascular:      Rate and Rhythm: Tachycardia present. Rhythm irregular.      Heart sounds: No murmur heard.  Pulmonary:      Effort: Tachypnea, accessory muscle usage and respiratory distress present.      Breath sounds: Wheezing present. No decreased breath sounds, rhonchi or rales.   Abdominal:      Palpations: Abdomen is soft.      Tenderness: There is no abdominal tenderness. There is no guarding or rebound.   Musculoskeletal:      Cervical back: Normal range of motion and neck supple.      Right lower leg: No tenderness. No edema.      Left lower leg: No tenderness. No edema.   Skin:     General: Skin is warm and dry.      Capillary Refill: Capillary refill takes less than 2 seconds.      Findings: No rash.   Neurological:      General: No focal deficit present.      Mental Status: She is alert.   Psychiatric:         Mood and Affect: Mood normal.         Behavior: Behavior normal.         Critical Care  Performed by: Saeed Bush MD  Authorized by: Maria C Andersen MD     Critical care provider statement:     Critical care time (minutes):  35    Critical care was necessary to treat or prevent imminent or life-threatening deterioration of the following conditions:  Circulatory failure, respiratory failure and sepsis     Critical care was time spent personally by me on the following activities:  Evaluation of patient's response to treatment, examination of patient, re-evaluation of patient's condition, pulse oximetry, ordering and review of radiographic studies, ordering and review of laboratory studies and ordering and performing treatments and interventions               ED Course  ED Course as of 08/02/22 0850   Tue Aug 02, 2022   0710 We have called [facility] and asked for med list.  We have reviewed the records they have sent.  She is on Xarelto but it looks like it is for DVT.  She is not on any rate control medicines.  She is on Bumex 2 mg daily.  Chest x-ray is negative for pneumonia although I think it looks a little bit congested, likely CHF from her rapid A. fib.  We will give IV Lasix.  Awaiting urinalysis.  After 10 mg Cardizem her heart rate is down around 140 but she remains in rapid A. fib.  Blood pressure has maintained so we will give additional Cardizem and initiate drip. [DT]   0739 Upon repeat exam she is able to provide history.  She continues to deny pain.  I am able to get a good abdominal exam and she has no tenderness.  She tells me she just feels awful all over though.  She reports nausea as her main complaint at this time and tells me she feels like she needs to throw up.  She tells me this just started today.  Urine is not impressive.  The source of her sepsis is not entirely evident at this time.  PCR panel is negative for any specific pathogen.  Heart rate is 100 220.  Systolic blood pressure is 110. [DT]   2521 I have conferred with Dr. Lamb who will admit. [DT]      ED Course User Index  [DT] Saeed Bush MD                                           MDM  Number of Diagnoses or Management Options  Acute on chronic diastolic congestive heart failure (HCC): new and requires workup  Atrial fibrillation with rapid ventricular response (HCC): new and requires workup  Sepsis with acute  hypoxic respiratory failure without septic shock, due to unspecified organism (HCC): new and requires workup     Amount and/or Complexity of Data Reviewed  Clinical lab tests: reviewed and ordered  Tests in the radiology section of CPT®: ordered and reviewed  Obtain history from someone other than the patient: yes  Review and summarize past medical records: yes  Discuss the patient with other providers: yes  Independent visualization of images, tracings, or specimens: yes    Critical Care  Total time providing critical care: 30-74 minutes    Patient Progress  Patient progress: improved      Final diagnoses:   Sepsis with acute hypoxic respiratory failure without septic shock, due to unspecified organism (HCC)   Atrial fibrillation with rapid ventricular response (HCC)   Acute on chronic diastolic congestive heart failure (HCC)       ED Disposition  ED Disposition     ED Disposition   Decision to Admit    Condition   --    Comment   Level of Care: Telemetry [5]   Diagnosis: Sepsis with acute hypoxic respiratory failure without septic shock, due to unspecified organism (HCC) [[id number]]               No follow-up provider specified.       Medication List      No changes were made to your prescriptions during this visit.          Saeed Bush MD  08/02/22 0849

## 2022-08-03 ENCOUNTER — APPOINTMENT (OUTPATIENT)
Dept: CARDIOLOGY | Facility: HOSPITAL | Age: 87
End: 2022-08-03

## 2022-08-03 ENCOUNTER — APPOINTMENT (OUTPATIENT)
Dept: GENERAL RADIOLOGY | Facility: HOSPITAL | Age: 87
End: 2022-08-03

## 2022-08-03 PROBLEM — R78.81 BACTEREMIA DUE TO GROUP B STREPTOCOCCUS: Status: ACTIVE | Noted: 2022-08-03

## 2022-08-03 PROBLEM — B95.1 BACTEREMIA DUE TO GROUP B STREPTOCOCCUS: Status: ACTIVE | Noted: 2022-08-03

## 2022-08-03 LAB
ANION GAP SERPL CALCULATED.3IONS-SCNC: 14 MMOL/L (ref 5–15)
ASCENDING AORTA: 3.4 CM
BH CV ECHO MEAS - AO MAX PG: 4.4 MMHG
BH CV ECHO MEAS - AO MEAN PG: 3 MMHG
BH CV ECHO MEAS - AO ROOT DIAM: 3 CM
BH CV ECHO MEAS - AO V2 MAX: 105 CM/SEC
BH CV ECHO MEAS - AO V2 VTI: 23.9 CM
BH CV ECHO MEAS - AVA(I,D): 1.31 CM2
BH CV ECHO MEAS - EDV(CUBED): 103.8 ML
BH CV ECHO MEAS - ESV(CUBED): 22 ML
BH CV ECHO MEAS - FS: 40.4 %
BH CV ECHO MEAS - IVS/LVPW: 1 CM
BH CV ECHO MEAS - IVSD: 0.9 CM
BH CV ECHO MEAS - LA DIMENSION: 3.6 CM
BH CV ECHO MEAS - LAT PEAK E' VEL: 11.3 CM/SEC
BH CV ECHO MEAS - LV MASS(C)D: 142.7 GRAMS
BH CV ECHO MEAS - LV MAX PG: 1.32 MMHG
BH CV ECHO MEAS - LV MEAN PG: 1 MMHG
BH CV ECHO MEAS - LV V1 MAX: 57.5 CM/SEC
BH CV ECHO MEAS - LV V1 VTI: 9.9 CM
BH CV ECHO MEAS - LVIDD: 4.7 CM
BH CV ECHO MEAS - LVIDS: 2.8 CM
BH CV ECHO MEAS - LVOT AREA: 3.1 CM2
BH CV ECHO MEAS - LVOT DIAM: 2 CM
BH CV ECHO MEAS - LVPWD: 0.9 CM
BH CV ECHO MEAS - MED PEAK E' VEL: 9 CM/SEC
BH CV ECHO MEAS - MV DEC SLOPE: 544 CM/SEC2
BH CV ECHO MEAS - MV DEC TIME: 0.15 MSEC
BH CV ECHO MEAS - MV E MAX VEL: 83.8 CM/SEC
BH CV ECHO MEAS - MV MAX PG: 4.7 MMHG
BH CV ECHO MEAS - MV MEAN PG: 2 MMHG
BH CV ECHO MEAS - MV V2 VTI: 17.2 CM
BH CV ECHO MEAS - MVA(VTI): 1.82 CM2
BH CV ECHO MEAS - PA ACC TIME: 0.07 SEC
BH CV ECHO MEAS - PA PR(ACCEL): 48.2 MMHG
BH CV ECHO MEAS - PA V2 MAX: 60.6 CM/SEC
BH CV ECHO MEAS - RAP SYSTOLE: 8 MMHG
BH CV ECHO MEAS - RVSP: 68 MMHG
BH CV ECHO MEAS - SV(LVOT): 31.2 ML
BH CV ECHO MEAS - TAPSE (>1.6): 0.64 CM
BH CV ECHO MEAS - TR MAX PG: 55.7 MMHG
BH CV ECHO MEAS - TR MAX VEL: 371.6 CM/SEC
BH CV ECHO MEASUREMENTS AVERAGE E/E' RATIO: 8.26
BH CV VAS BP RIGHT ARM: NORMAL MMHG
BH CV XLRA - RV BASE: 4 CM
BH CV XLRA - RV LENGTH: 5 CM
BH CV XLRA - RV MID: 4.2 CM
BH CV XLRA - TDI S': 6.1 CM/SEC
BUN SERPL-MCNC: 47 MG/DL (ref 8–23)
BUN/CREAT SERPL: 29.7 (ref 7–25)
CALCIUM SPEC-SCNC: 7.2 MG/DL (ref 8.2–9.6)
CHLORIDE SERPL-SCNC: 98 MMOL/L (ref 98–107)
CHOLEST SERPL-MCNC: 82 MG/DL (ref 0–200)
CO2 SERPL-SCNC: 25 MMOL/L (ref 22–29)
CREAT SERPL-MCNC: 1.58 MG/DL (ref 0.57–1)
D-LACTATE SERPL-SCNC: 1.9 MMOL/L (ref 0.5–2)
DEPRECATED RDW RBC AUTO: 48.5 FL (ref 37–54)
EGFRCR SERPLBLD CKD-EPI 2021: 30.6 ML/MIN/1.73
ERYTHROCYTE [DISTWIDTH] IN BLOOD BY AUTOMATED COUNT: 15.6 % (ref 12.3–15.4)
GLUCOSE BLDC GLUCOMTR-MCNC: 107 MG/DL (ref 70–130)
GLUCOSE BLDC GLUCOMTR-MCNC: 164 MG/DL (ref 70–130)
GLUCOSE BLDC GLUCOMTR-MCNC: 184 MG/DL (ref 70–130)
GLUCOSE BLDC GLUCOMTR-MCNC: 196 MG/DL (ref 70–130)
GLUCOSE SERPL-MCNC: 110 MG/DL (ref 65–99)
HBA1C MFR BLD: 9 % (ref 4.8–5.6)
HCT VFR BLD AUTO: 37.2 % (ref 34–46.6)
HDLC SERPL-MCNC: 23 MG/DL (ref 40–60)
HGB BLD-MCNC: 11.7 G/DL (ref 12–15.9)
LDLC SERPL CALC-MCNC: 42 MG/DL (ref 0–100)
LDLC/HDLC SERPL: 1.85 {RATIO}
LV EF 2D ECHO EST: 55 %
MAGNESIUM SERPL-MCNC: 1.6 MG/DL (ref 1.7–2.3)
MAXIMAL PREDICTED HEART RATE: 128 BPM
MCH RBC QN AUTO: 27 PG (ref 26.6–33)
MCHC RBC AUTO-ENTMCNC: 31.5 G/DL (ref 31.5–35.7)
MCV RBC AUTO: 85.9 FL (ref 79–97)
PLATELET # BLD AUTO: 119 10*3/MM3 (ref 140–450)
PMV BLD AUTO: 10.7 FL (ref 6–12)
POTASSIUM SERPL-SCNC: 3.8 MMOL/L (ref 3.5–5.2)
RBC # BLD AUTO: 4.33 10*6/MM3 (ref 3.77–5.28)
SODIUM SERPL-SCNC: 137 MMOL/L (ref 136–145)
STRESS TARGET HR: 109 BPM
TRIGL SERPL-MCNC: 82 MG/DL (ref 0–150)
VANCOMYCIN SERPL-MCNC: 12.3 MCG/ML (ref 5–40)
VLDLC SERPL-MCNC: 17 MG/DL (ref 5–40)
WBC NRBC COR # BLD: 15.54 10*3/MM3 (ref 3.4–10.8)

## 2022-08-03 PROCEDURE — 63710000001 INSULIN DETEMIR PER 5 UNITS: Performed by: INTERNAL MEDICINE

## 2022-08-03 PROCEDURE — 93306 TTE W/DOPPLER COMPLETE: CPT

## 2022-08-03 PROCEDURE — 25010000002 PIPERACILLIN SOD-TAZOBACTAM PER 1 G

## 2022-08-03 PROCEDURE — 63710000001 INSULIN LISPRO (HUMAN) PER 5 UNITS: Performed by: INTERNAL MEDICINE

## 2022-08-03 PROCEDURE — 83605 ASSAY OF LACTIC ACID: CPT | Performed by: STUDENT IN AN ORGANIZED HEALTH CARE EDUCATION/TRAINING PROGRAM

## 2022-08-03 PROCEDURE — 99233 SBSQ HOSP IP/OBS HIGH 50: CPT | Performed by: INTERNAL MEDICINE

## 2022-08-03 PROCEDURE — 97165 OT EVAL LOW COMPLEX 30 MIN: CPT

## 2022-08-03 PROCEDURE — 92611 MOTION FLUOROSCOPY/SWALLOW: CPT

## 2022-08-03 PROCEDURE — 80048 BASIC METABOLIC PNL TOTAL CA: CPT | Performed by: INTERNAL MEDICINE

## 2022-08-03 PROCEDURE — 74230 X-RAY XM SWLNG FUNCJ C+: CPT

## 2022-08-03 PROCEDURE — 80061 LIPID PANEL: CPT | Performed by: INTERNAL MEDICINE

## 2022-08-03 PROCEDURE — 80202 ASSAY OF VANCOMYCIN: CPT

## 2022-08-03 PROCEDURE — 25010000002 MAGNESIUM SULFATE 2 GM/50ML SOLUTION: Performed by: INTERNAL MEDICINE

## 2022-08-03 PROCEDURE — 85027 COMPLETE CBC AUTOMATED: CPT | Performed by: INTERNAL MEDICINE

## 2022-08-03 PROCEDURE — 83735 ASSAY OF MAGNESIUM: CPT

## 2022-08-03 PROCEDURE — 82962 GLUCOSE BLOOD TEST: CPT

## 2022-08-03 PROCEDURE — 92610 EVALUATE SWALLOWING FUNCTION: CPT

## 2022-08-03 PROCEDURE — 97161 PT EVAL LOW COMPLEX 20 MIN: CPT

## 2022-08-03 PROCEDURE — 93306 TTE W/DOPPLER COMPLETE: CPT | Performed by: INTERNAL MEDICINE

## 2022-08-03 PROCEDURE — 83036 HEMOGLOBIN GLYCOSYLATED A1C: CPT | Performed by: INTERNAL MEDICINE

## 2022-08-03 RX ORDER — CASTOR OIL AND BALSAM, PERU 788; 87 MG/G; MG/G
1 OINTMENT TOPICAL EVERY 12 HOURS SCHEDULED
Status: DISCONTINUED | OUTPATIENT
Start: 2022-08-03 | End: 2022-08-09 | Stop reason: HOSPADM

## 2022-08-03 RX ORDER — MAGNESIUM SULFATE HEPTAHYDRATE 40 MG/ML
2 INJECTION, SOLUTION INTRAVENOUS ONCE
Status: COMPLETED | OUTPATIENT
Start: 2022-08-03 | End: 2022-08-03

## 2022-08-03 RX ADMIN — MAGNESIUM SULFATE HEPTAHYDRATE 2 G: 2 INJECTION, SOLUTION INTRAVENOUS at 11:37

## 2022-08-03 RX ADMIN — CETIRIZINE HYDROCHLORIDE TABLETS 10 MG: 10 TABLET, FILM COATED ORAL at 08:44

## 2022-08-03 RX ADMIN — CASTOR OIL AND BALSAM, PERU 1 APPLICATION: 788; 87 OINTMENT TOPICAL at 20:37

## 2022-08-03 RX ADMIN — METOPROLOL TARTRATE 25 MG: 25 TABLET, FILM COATED ORAL at 08:44

## 2022-08-03 RX ADMIN — TAZOBACTAM SODIUM AND PIPERACILLIN SODIUM 3.38 G: 375; 3 INJECTION, SOLUTION INTRAVENOUS at 16:24

## 2022-08-03 RX ADMIN — RIVAROXABAN 15 MG: 15 TABLET, FILM COATED ORAL at 17:18

## 2022-08-03 RX ADMIN — METOPROLOL TARTRATE 25 MG: 25 TABLET, FILM COATED ORAL at 20:37

## 2022-08-03 RX ADMIN — TAZOBACTAM SODIUM AND PIPERACILLIN SODIUM 3.38 G: 375; 3 INJECTION, SOLUTION INTRAVENOUS at 08:44

## 2022-08-03 RX ADMIN — Medication 10 ML: at 08:45

## 2022-08-03 RX ADMIN — Medication 10 ML: at 20:37

## 2022-08-03 RX ADMIN — INSULIN LISPRO 2 UNITS: 100 INJECTION, SOLUTION INTRAVENOUS; SUBCUTANEOUS at 17:18

## 2022-08-03 RX ADMIN — BARIUM SULFATE 20 ML: 400 PASTE ORAL at 14:44

## 2022-08-03 RX ADMIN — TAZOBACTAM SODIUM AND PIPERACILLIN SODIUM 3.38 G: 375; 3 INJECTION, SOLUTION INTRAVENOUS at 02:52

## 2022-08-03 RX ADMIN — LEVOTHYROXINE SODIUM 88 MCG: 88 TABLET ORAL at 06:28

## 2022-08-03 RX ADMIN — NYSTATIN 1 APPLICATION: 100000 POWDER TOPICAL at 20:38

## 2022-08-03 RX ADMIN — BARIUM SULFATE 100 ML: 0.81 POWDER, FOR SUSPENSION ORAL at 14:44

## 2022-08-03 RX ADMIN — TRAZODONE HYDROCHLORIDE 50 MG: 50 TABLET ORAL at 20:37

## 2022-08-03 RX ADMIN — Medication 10 MG: at 20:37

## 2022-08-03 RX ADMIN — NYSTATIN 1 APPLICATION: 100000 POWDER TOPICAL at 08:45

## 2022-08-03 RX ADMIN — INSULIN DETEMIR 10 UNITS: 100 INJECTION, SOLUTION SUBCUTANEOUS at 20:38

## 2022-08-03 RX ADMIN — SODIUM CHLORIDE 500 ML: 9 INJECTION, SOLUTION INTRAVENOUS at 00:20

## 2022-08-03 RX ADMIN — MONTELUKAST 10 MG: 10 TABLET, FILM COATED ORAL at 20:37

## 2022-08-03 RX ADMIN — INSULIN LISPRO 2 UNITS: 100 INJECTION, SOLUTION INTRAVENOUS; SUBCUTANEOUS at 12:28

## 2022-08-03 NOTE — THERAPY EVALUATION
Patient Name: Rosanna Browne  : 1930    MRN: [id number]                              Today's Date: 8/3/2022       Admit Date: 2022    Visit Dx:     ICD-10-CM ICD-9-CM   1. Sepsis with acute hypoxic respiratory failure without septic shock, due to unspecified organism (HCC)  A41.9 038.9    R65.20 995.91    J96.01 518.81   2. Atrial fibrillation with rapid ventricular response (HCC)  I48.91 427.31   3. Acute on chronic diastolic congestive heart failure (HCC)  I50.33 428.33     428.0     Patient Active Problem List   Diagnosis   • Sepsis with acute hypoxic respiratory failure without septic shock, due to unspecified organism (HCC)   • A-fib (HCC)   • Elevated serum creatinine   • Thrombocytopenia (HCC)   • Lactic acidosis   • Hypomagnesemia   • HTN (hypertension)   • HLD (hyperlipidemia)   • Hypothyroidism   • CKD (chronic kidney disease), stage II   • (HFpEF) heart failure with preserved ejection fraction (HCC)   • Pulmonary hypertension (HCC)   • Mitral valve regurgitation   • T2DM (type 2 diabetes mellitus) (HCC)   • Bacteremia due to group B Streptococcus     Past Medical History:   Diagnosis Date   • A-fib (HCC)    • CHF (congestive heart failure) (HCC)    • CKD (chronic kidney disease), stage II    • Diabetes (HCC)    • HLD (hyperlipidemia)    • Hypertension    • Pulmonary hypertension (HCC)    • Thyroid disease      Past Surgical History:   Procedure Laterality Date   • CHOLECYSTECTOMY     • HIP ARTHROPLASTY     • TUBAL ABDOMINAL LIGATION        General Information     Row Name 22 1058          Physical Therapy Time and Intention    Document Type evaluation  -SS     Mode of Treatment physical therapy  -     Row Name 22 2247          General Information    Patient Profile Reviewed yes  -SS     Prior Level of Function dependent:;w/c or scooter;min assist:;gait;transfer;bed mobility  use of rollator/WC at baseline  -SS     Existing Precautions/Restrictions fall;oxygen therapy device  and L/min  -     Barriers to Rehab medically complex;previous functional deficit  -     Row Name 08/03/22 1058          Living Environment    People in Home facility resident  -     Row Name 08/03/22 1058          Home Main Entrance    Number of Stairs, Main Entrance none  -SS     Row Name 08/03/22 1058          Stairs Within Home, Primary    Number of Stairs, Within Home, Primary none  -SS     Row Name 08/03/22 1058          Cognition    Orientation Status (Cognition) oriented to;person;situation;time;verbal cues/prompts needed for orientation;place  -     Row Name 08/03/22 1058          Safety Issues, Functional Mobility    Safety Issues Affecting Function (Mobility) insight into deficits/self-awareness;safety precaution awareness;safety precautions follow-through/compliance;sequencing abilities  -     Impairments Affecting Function (Mobility) balance;endurance/activity tolerance;postural/trunk control;pain;shortness of breath;strength  -           User Key  (r) = Recorded By, (t) = Taken By, (c) = Cosigned By    Initials Name Provider Type     Daniela Bhatia PT Physical Therapist               Mobility     Row Name 08/03/22 1147          Bed Mobility    Bed Mobility supine-sit;scooting/bridging  -     Scooting/Bridging [place] (Bed Mobility) moderate assist (50% patient effort);2 person assist;verbal cues  -     Supine-Sit [place] (Bed Mobility) moderate assist (50% patient effort);2 person assist;verbal cues  -     Assistive Device (Bed Mobility) bed rails;head of bed elevated  -     Comment, (Bed Mobility) VC for sequencing  -     Row Name 08/03/22 1145          Bed-Chair Transfer    Bed-Chair [place] (Transfers) moderate assist (50% patient effort);2 person assist;verbal cues  -     Assistive Device (Bed-Chair Transfers) other (see comments)  BUE support  -     Comment, (Bed-Chair Transfer) VC for hand placement, sequencing  -     Row Name 08/03/22 1141           Sit-Stand Transfer    Sit-Stand [place] (Transfers) moderate assist (50% patient effort);2 person assist;verbal cues  -     Assistive Device (Sit-Stand Transfers) other (see comments)  BUE support  -     Comment, (Sit-Stand Transfer) VC for hand placement  -     Row Name 08/03/22 1147          Gait/Stairs (Locomotion)    Comment, (Gait/Stairs) pt declined  -           User Key  (r) = Recorded By, (t) = Taken By, (c) = Cosigned By    Initials Name Provider Type     Daniela Bhatia, PT Physical Therapist               Obj/Interventions     Row Name 08/03/22 1148          Range of Motion Comprehensive    General Range of Motion bilateral lower extremity ROM WFL  -     Row Name 08/03/22 1148          Strength Comprehensive (MMT)    Comment, General Manual Muscle Testing (MMT) Assessment BLE gross 3/5  -     Row Name 08/03/22 1148          Motor Skills    Motor Skills functional endurance  -     Functional Endurance decreased activity tolerance  -     Row Name 08/03/22 1148          Balance    Balance Assessment sitting static balance;sitting dynamic balance;sit to stand dynamic balance;standing static balance;standing dynamic balance  -     Static Sitting Balance contact guard  -     Dynamic Sitting Balance minimal assist  -     Position, Sitting Balance unsupported;sitting edge of bed  -     Sit to Stand Dynamic Balance moderate assist;2-person assist  -     Static Standing Balance moderate assist;2-person assist  -SS     Dynamic Standing Balance moderate assist;2-person assist  -SS     Position/Device Used, Standing Balance supported;other (see comments)  BUE support  -     Balance Interventions sitting;standing;sit to stand;supported;static;dynamic  -     Row Name 08/03/22 1148          Sensory Assessment (Somatosensory)    Sensory Assessment (Somatosensory) LE sensation intact  -           User Key  (r) = Recorded By, (t) = Taken By, (c) = Cosigned By    Initials Name Provider  Type    SS Daniela Bhatia, PT Physical Therapist               Goals/Plan     Row Name 08/03/22 1155          Bed Mobility Goal 1 (PT)    Activity/Assistive Device (Bed Mobility Goal 1, PT) bed mobility activities, all  -SS     [place] Level/Cues Needed (Bed Mobility Goal 1, PT) minimum assist (75% or more patient effort)  -SS     Time Frame (Bed Mobility Goal 1, PT) long term goal (LTG);10 days  -     Row Name 08/03/22 1155          Transfer Goal 1 (PT)    Activity/Assistive Device (Transfer Goal 1, PT) sit-to-stand/stand-to-sit;bed-to-chair/chair-to-bed  -SS     [place] Level/Cues Needed (Transfer Goal 1, PT) minimum assist (75% or more patient effort)  -SS     Time Frame (Transfer Goal 1, PT) long term goal (LTG);10 days  -     Row Name 08/03/22 1155          Gait Training Goal 1 (PT)    Activity/Assistive Device (Gait Training Goal 1, PT) gait (walking locomotion);assistive device use;walker, rolling  -SS     [place] Level (Gait Training Goal 1, PT) minimum assist (75% or more patient effort)  -SS     Distance (Gait Training Goal 1, PT) 50  -SS     Time Frame (Gait Training Goal 1, PT) long term goal (LTG);10 days  -     Row Name 08/03/22 1158          Therapy Assessment/Plan (PT)    Planned Therapy Interventions (PT) balance training;bed mobility training;gait training;home exercise program;neuromuscular re-education;patient/family education;postural re-education;ROM (range of motion);strengthening;stretching;transfer training  -           User Key  (r) = Recorded By, (t) = Taken By, (c) = Cosigned By    Initials Name Provider Type     Daniela Bhatia PT Physical Therapist               Clinical Impression     Row Name 08/03/22 1152          Pain    Pretreatment Pain Rating 5/10  -SS     Posttreatment Pain Rating 5/10  -SS     Pain Location - Side/Orientation Bilateral  -SS     Pain Location lower  -SS     Pain Location - extremity  -SS     Pain Intervention(s)  Repositioned;Ambulation/increased activity;Elevated  -     Additional Documentation Pain Scale: Numbers Pre/Post-Treatment (Group)  -     Row Name 08/03/22 1152          Plan of Care Review    Plan of Care Reviewed With patient  -     Outcome Evaluation Pt. presents with generalized weakness, balance deficits and decreased activity tolerance affecting her ability to safely participate in functional mobility. She performed bed mobility with mod assist of 2. She performed sit to stand and bed to chair transfer w/mod assist of 2. Pt. requires encourgement for participation. Recommend SNF upon discharge.  -     Row Name 08/03/22 1152          Therapy Assessment/Plan (PT)    Rehab Potential (PT) fair, will monitor progress closely  -     Criteria for Skilled Interventions Met (PT) yes;meets criteria;skilled treatment is necessary  -     Therapy Frequency (PT) daily  -     Row Name 08/03/22 1152          Vital Signs    Pre Systolic BP Rehab 99  -SS     Pre Treatment Diastolic BP 61  -SS     Post Systolic BP Rehab 104  -SS     Post Treatment Diastolic BP 63  -SS     Pretreatment Heart Rate (beats/min) 92  -SS     Posttreatment Heart Rate (beats/min) 88  -SS     Pre SpO2 (%) 95  -SS     O2 Delivery Pre Treatment nasal cannula  3.5L  -SS     Post SpO2 (%) 94  -SS     O2 Delivery Post Treatment nasal cannula  3.5L  -SS     Row Name 08/03/22 1152          Positioning and Restraints    Pre-Treatment Position in bed  -     Post Treatment Position chair  -SS     In Chair notified nsg;reclined;call light within reach;encouraged to call for assist;exit alarm on;waffle cushion;legs elevated;heels elevated  -           User Key  (r) = Recorded By, (t) = Taken By, (c) = Cosigned By    Initials Name Provider Type     Daniela Bhatia, PT Physical Therapist               Outcome Measures     Row Name 08/03/22 1158          How much help from another person do you currently need...    Turning from your back to your side  while in flat bed without using bedrails? 2  -SS     Moving from lying on back to sitting on the side of a flat bed without bedrails? 2  -SS     Moving to and from a bed to a chair (including a wheelchair)? 2  -SS     Standing up from a chair using your arms (e.g., wheelchair, bedside chair)? 2  -SS     Climbing 3-5 steps with a railing? 1  -SS     To walk in hospital room? 2  -SS     AM-PAC 6 Clicks Score (PT) 11  -SS     Highest level of mobility 4 --> Transferred to chair/commode  -     Row Name 08/03/22 1155 08/03/22 1121       Functional Assessment    Outcome Measure Options AM-PAC 6 Clicks Basic Mobility (PT)  - AM-PAC 6 Clicks Daily Activity (OT)  -          User Key  (r) = Recorded By, (t) = Taken By, (c) = Cosigned By    Initials Name Provider Type    Silvana Blanco, OT Occupational Therapist     Daniela Bhatia, PT Physical Therapist                             Physical Therapy Education                 Title: PT OT SLP Therapies (In Progress)     Topic: Physical Therapy (In Progress)     Point: Mobility training (Done)     Learning Progress Summary           Patient Acceptance, E, VU,NR by  at 8/3/2022 1156    Comment: Educated pt. safety/technique with bed mobility, transfers, PT POC, importance of daily mobility and out of bed time                   Point: Home exercise program (Not Started)     Learner Progress:  Not documented in this visit.          Point: Body mechanics (Done)     Learning Progress Summary           Patient Acceptance, E, VU,NR by  at 8/3/2022 1156    Comment: Educated pt. safety/technique with bed mobility, transfers, PT POC, importance of daily mobility and out of bed time                   Point: Precautions (Done)     Learning Progress Summary           Patient Acceptance, E, VU,NR by  at 8/3/2022 1156    Comment: Educated pt. safety/technique with bed mobility, transfers, PT POC, importance of daily mobility and out of bed time                               User  Key     Initials Effective Dates Name Provider Type Discipline     06/01/21 -  Daniela Bhatia PT Physical Therapist PT              PT Recommendation and Plan  Planned Therapy Interventions (PT): balance training, bed mobility training, gait training, home exercise program, neuromuscular re-education, patient/family education, postural re-education, ROM (range of motion), strengthening, stretching, transfer training  Plan of Care Reviewed With: patient  Outcome Evaluation: Pt. presents with generalized weakness, balance deficits and decreased activity tolerance affecting her ability to safely participate in functional mobility. She performed bed mobility with mod assist of 2. She performed sit to stand and bed to chair transfer w/mod assist of 2. Pt. requires encourgement for participation. Recommend SNF upon discharge.     Time Calculation:    PT Charges     Row Name 08/03/22 1157             Time Calculation    Start Time 1007  -SS      Stop Time 1030  -SS      Time Calculation (min) 23 min  -SS      PT Received On 08/03/22  -      PT Goal Re-Cert Due Date 08/13/22  -              Time Calculation- PT    Total Timed Code Minutes- PT 23 minute(s)  -SS              Untimed Charges    PT Eval/Re-eval Minutes 50  -SS              Total Minutes    Untimed Charges Total Minutes 50  -SS       Total Minutes 50  -SS            User Key  (r) = Recorded By, (t) = Taken By, (c) = Cosigned By    Initials Name Provider Type     Daniela Bhatia PT Physical Therapist              Therapy Charges for Today     Code Description Service Date Service Provider Modifiers Qty    [id number] HC PT EVAL LOW COMPLEXITY 4 8/3/2022 Daniela Bhatia PT GP 1          PT G-Codes  Outcome Measure Options: AM-PAC 6 Clicks Basic Mobility (PT)  AM-PAC 6 Clicks Score (PT): 11  AM-PAC 6 Clicks Score (OT): 12    Daniela Bhatia PT  8/3/2022

## 2022-08-03 NOTE — CASE MANAGEMENT/SOCIAL WORK
Discharge Planning Assessment  [facility]     Patient Name: Rosanna Browne  MRN: [id number]  Today's Date: 8/3/2022    Admit Date: 8/2/2022     Discharge Needs Assessment     Row Name 08/03/22 1040       Living Environment    People in Home facility resident    Current Living Arrangements extended care facility    Primary Care Provided by other (see comments)    Provides Primary Care For no one, unable/limited ability to care for self    Family Caregiver if Needed other (see comments)    Quality of Family Relationships unable to assess    Able to Return to Prior Arrangements yes       Resource/Environmental Concerns    Resource/Environmental Concerns none       Transition Planning    Patient/Family Anticipates Transition to long-term care facility    Patient/Family Anticipated Services at Transition ;rehabilitation services    Transportation Anticipated health plan transportation       Discharge Needs Assessment    Readmission Within the Last 30 Days no previous admission in last 30 days    Equipment Currently Used at Home wheelchair;oxygen    Concerns to be Addressed discharge planning    Anticipated Changes Related to Illness none    Equipment Needed After Discharge none               Discharge Plan     Row Name 08/03/22 1040       Plan    Plan [facility]    Patient/Family in Agreement with Plan yes    Plan Comments Spoke with Edgar in admissions at [facility].  Patient is in an intermediate care bed there and has a bed hold.  She is dependent for ADL's and uses a wheelchair to get around.  She also wears continuous O2@2L.  Her PCP is Red Perez.  She does not have an advanced directive.  Facility provides medications.  Ms. Browne's plan will be to return to [facility] at discharge.   will continue to follow.    Final Discharge Disposition Code 03 - skilled nursing facility (SNF)              Continued Care and Services - Admitted Since 8/2/2022     Coordination has not been started for this encounter.       Expected Discharge Date and Time     Expected Discharge Date Expected Discharge Time    Aug 6, 2022          Demographic Summary     Row Name 08/03/22 1039       General Information    Admission Type inpatient    Arrived From emergency department    Referral Source admission list    Reason for Consult decision-making    Preferred Language English               Functional Status     Row Name 08/03/22 1039       Functional Status    Usual Activity Tolerance fair    Current Activity Tolerance fair       Functional Status, IADL    Medications completely dependent    Meal Preparation completely dependent    Housekeeping completely dependent    Laundry completely dependent    Shopping completely dependent               Psychosocial    No documentation.                Abuse/Neglect    No documentation.                Legal    No documentation.                Substance Abuse    No documentation.                Patient Forms    No documentation.                   Pricilla Drew RN

## 2022-08-03 NOTE — MBS/VFSS/FEES
Acute Care - Speech Language Pathology   Swallow Initial Evaluation  Vic   Modified Barium Swallow Study (MBS)     Patient Name: Rosanna Browne  : 1930  MRN: [id number]  Today's Date: 8/3/2022               Admit Date: 2022    Visit Dx:     ICD-10-CM ICD-9-CM   1. Sepsis with acute hypoxic respiratory failure without septic shock, due to unspecified organism (HCC)  A41.9 038.9    R65.20 995.91    J96.01 518.81   2. Atrial fibrillation with rapid ventricular response (HCC)  I48.91 427.31   3. Acute on chronic diastolic congestive heart failure (HCC)  I50.33 428.33     428.0     Patient Active Problem List   Diagnosis   • Sepsis with acute hypoxic respiratory failure without septic shock, due to unspecified organism (HCC)   • A-fib (HCC)   • Elevated serum creatinine   • Thrombocytopenia (HCC)   • Lactic acidosis   • Hypomagnesemia   • HTN (hypertension)   • HLD (hyperlipidemia)   • Hypothyroidism   • CKD (chronic kidney disease), stage II   • (HFpEF) heart failure with preserved ejection fraction (HCC)   • Pulmonary hypertension (HCC)   • Mitral valve regurgitation   • T2DM (type 2 diabetes mellitus) (HCC)   • Bacteremia due to group B Streptococcus     Past Medical History:   Diagnosis Date   • A-fib (HCC)    • CHF (congestive heart failure) (HCC)    • CKD (chronic kidney disease), stage II    • Diabetes (HCC)    • HLD (hyperlipidemia)    • Hypertension    • Pulmonary hypertension (HCC)    • Thyroid disease      Past Surgical History:   Procedure Laterality Date   • CHOLECYSTECTOMY     • HIP ARTHROPLASTY     • TUBAL ABDOMINAL LIGATION         SLP Recommendation and Plan  SLP Swallowing Diagnosis: functional oral phase, functional pharyngeal phase, other (see comments) (grossly functional for pt's age) (22 1420)  SLP Diet Recommendation: regular textures, thin liquids (22 1420)  Recommended Precautions and Strategies: upright posture during/after eating, general aspiration  precautions, reflux precautions (08/03/22 1420)  SLP Rec. for Method of Medication Administration: meds whole, with thin liquids, with pudding or applesauce, as tolerated (08/03/22 1420)     Monitor for Signs of Aspiration: yes, notify SLP if any concerns (08/03/22 1420)     Swallow Criteria for Skilled Therapeutic Interventions Met: no problems identified which require skilled intervention, baseline status (08/03/22 1420)  Anticipated Discharge Disposition (SLP): unknown (08/03/22 1420)  Rehab Potential/Prognosis, Swallowing: good, to achieve stated therapy goals (08/03/22 1420)  Therapy Frequency (Swallow): evaluation only (08/03/22 1420)                                            SWALLOW EVALUATION (last 72 hours)     SLP Adult Swallow Evaluation     Row Name 08/03/22 1420                Rehab Evaluation    Document Type evaluation  -RD       Subjective Information no complaints  -RD       Patient Observations alert;cooperative;agree to therapy  -RD       Patient/Family/Caregiver Comments/Observations none  -RD       Patient Effort good  -RD       Symptoms Noted During/After Treatment --               General Information    Patient Profile Reviewed yes  -RD       Pertinent History Of Current Problem See intial evaluation. MBS to r/o aspiration.  -RD       Current Method of Nutrition nectar/syrup-thick liquids  -RD       Precautions/Limitations, Vision WFL;for purposes of eval  -RD       Precautions/Limitations, Hearing other (see comments)  [place]  -RD       Prior Level of Function-Communication unknown  -RD       Prior Level of Function-Swallowing unknown  -RD       Plans/Goals Discussed with patient;agreed upon  -RD       Barriers to Rehab none identified  -RD       Patient's Goals for Discharge return to regular diet  -RD               Pain    Additional Documentation Pain Scale: FACES Pre/Post-Treatment (Group)  -RD               Pain Scale: FACES Pre/Post-Treatment    Pain: FACES Scale, Pretreatment 0-->no hurt   -RD       Posttreatment Pain Rating 0-->no hurt  -RD            MBS/VFSS    Utensils Used spoon;cup;straw  -RD       Consistencies Trialed thin liquids;pudding thick;regular textures  -RD               MBS/VFSS Interpretation    Oral Prep Phase WFL  -RD       Oral Residue WFL  -RD       Oral Phase, Comment Functional oral phase of the swallow. No significant oral weakness identified.  -RD               Initiation of Pharyngeal Swallow    Initiation of Pharyngeal Swallow bolus in pyriform sinuses  -RD       Penetration During the Swallow thin liquids;secondary to delayed swallow initiation or mistiming;other (see comments)  high penetration that cleared; functional for pt's age  -RD       Response to Penetration transient  -RD       Rosenbek's Scale thin:;2--->level 2;pudding/puree:;regular textures:;1--->level 1  -RD       Pharyngeal Phase, Comment Grossly functional pharyngeal swallow. Swallow initiation was to the pyriform sinuses. Penetration during the swallow w/ thin liquids that was high & cleared upon completion of the swallow. This was functional for pt's age. No aspiration observed w/ any consistency during exam. No significant pharyngeal residue or weakness identified.  -RD               Esophageal Phase    Esophageal Phase see radiology report for further details  -RD               SLP Evaluation Clinical Impression    SLP Swallowing Diagnosis functional oral phase;functional pharyngeal phase;other (see comments)  grossly functional for pt's age  -RD       Functional Impact no impact on function  -RD       Rehab Potential/Prognosis, Swallowing good, to achieve stated therapy goals  -RD       Swallow Criteria for Skilled Therapeutic Interventions Met no problems identified which require skilled intervention;baseline status  -RD               Recommendations    Therapy Frequency (Swallow) evaluation only  -RD       Predicted Duration Therapy Intervention (Days) --       SLP Diet Recommendation regular  textures;thin liquids  -RD       Recommended Diagnostics --       Recommended Precautions and Strategies upright posture during/after eating;general aspiration precautions;reflux precautions  -RD       Oral Care Recommendations Oral Care BID/PRN  -RD       SLP Rec. for Method of Medication Administration meds whole;with thin liquids;with pudding or applesauce;as tolerated  -RD       Monitor for Signs of Aspiration yes;notify SLP if any concerns  -RD       Anticipated Discharge Disposition (SLP) unknown  -RD             User Key  (r) = Recorded By, (t) = Taken By, (c) = Cosigned By    Initials Name Effective Dates    Brinda Oscar MS CCC-SLP 06/16/21 -      Judi Andersen, MS, CFBRANDT-SLP 06/22/22 -                 EDUCATION  The patient has been educated in the following areas:   Dysphagia (Swallowing Impairment) Oral Care/Hydration Modified Diet Instruction.              Time Calculation:    Time Calculation- SLP     Row Name 08/03/22 1453 08/03/22 1357          Time Calculation- SLP    SLP Start Time 1420  -RD 1110  -     SLP Received On 08/03/22  - 08/03/22  -            Untimed Charges    SLP Eval/Re-eval  ST Motion Fluoro Eval Swallow - 29828  -RD --     [id number] Eval Oral Pharyng Swallow Minutes -- 50  -     86638-XY Motion Fluoro Eval Swallow Minutes 53  -RD --            Total Minutes    Untimed Charges Total Minutes 53  -RD 50  -      Total Minutes 53  -RD 50  -           User Key  (r) = Recorded By, (t) = Taken By, (c) = Cosigned By    Initials Name Provider Type    Brinda Oscar MS CCC-SLP Speech and Language Pathologist     Judi Andersen, MS, CFY-SLP Speech and Language Pathologist                Therapy Charges for Today     Code Description Service Date Service Provider Modifiers Qty    [id number] HC ST MOTION FLUORO EVAL SWALLOW 4 8/3/2022 Brinda Castellano, MS CCC-SLP GN 1            Patient was not wearing a face mask and did exhibit coughing during this  therapy encounter.  Procedure performed was aerosolizing, involved close contact (within 6 feet for at least 15 minutes or longer), and did not involve contact with infectious secretions or specimens.  Therapist used appropriate personal protective equipment including gloves, standard procedure mask and eye protection.  Appropriate PPE was worn during the entire therapy session.  Hand hygiene was completed before and after therapy session.     Brinda Castellano MS CCC-SLP  8/3/2022

## 2022-08-03 NOTE — PROGRESS NOTES
[facility] Medicine Services  PROGRESS NOTE    Patient Name: Rosanna Browne  : 1930  MRN: [id number]    Date of Admission: 2022  Primary Care Physician: Red Perez MD    Subjective   Subjective     CC:  Severe sepsis    HPI:  Doing much better this am, but had a rough night as she was hypotensive and intermittently in and out of Afib with RVR. Pt states that she is having diarrhea today. Blames it on having regular milk (and not lactose free).     ROS:  Gen- No fevers, chills  CV- No chest pain, palpitations  Resp- No cough, dyspnea  GI- No N/V, abd pain      Objective   Objective     Vital Signs:   Temp:  [97.5 °F (36.4 °C)-99.7 °F (37.6 °C)] 98.1 °F (36.7 °C)  Heart Rate:  [] 89  Resp:  [20-36] 20  BP: ()/(41-71) 113/54  Flow (L/min):  [4-10] 4     Physical Exam:  Constitutional: No acute distress, awake, alert, [place]  HENT: NCAT, mucous membranes moist  Respiratory: Clear to auscultation bilaterally, respiratory effort normal   Cardiovascular: RRR, no murmurs, rubs, or gallops  Gastrointestinal: Positive bowel sounds, soft, nontender, nondistended  Musculoskeletal: BLE nonpitting edema, RLE with erythema to mid thigh (pt reports this is chronic)  Psychiatric: Appropriate affect, cooperative  Neurologic: Oriented x 3, strength symmetric in all extremities, Cranial Nerves grossly intact to confrontation, speech clear  Skin: No rashes, chronic RLE erythema     Results Reviewed:  LAB RESULTS:      Lab 22  0517 22  0016 22  1637 22  1250 22  0938 22  0602   WBC 15.54*  --   --   --   --  15.98*   HEMOGLOBIN 11.7*  --   --   --   --  13.5   HEMATOCRIT 37.2  --   --   --   --  43.3   PLATELETS 119*  --   --   --   --  137*   NEUTROS ABS  --   --   --   --   --  14.26*   IMMATURE GRANS (ABS)  --   --   --   --   --  0.16*   LYMPHS ABS  --   --   --   --   --  1.04   MONOS ABS  --   --   --   --   --  0.46   EOS ABS  --   --    --   --   --  0.02   MCV 85.9  --   --   --   --  85.7   CRP  --   --   --   --   --  13.77*   PROCALCITONIN  --   --   --   --   --  4.82*   LACTATE  --  1.9 2.3* 3.4* 3.1* 2.9*         Lab 08/03/22  0517 08/02/22  0602   SODIUM 137 133*   POTASSIUM 3.8 4.2   CHLORIDE 98 92*   CO2 25.0 27.0   ANION GAP 14.0 14.0   BUN 47* 43*   CREATININE 1.58* 1.37*   EGFR 30.6* 36.3*   GLUCOSE 110* 250*   CALCIUM 7.2* 8.6   MAGNESIUM  --  1.5*   PHOSPHORUS  --  2.9   TSH  --  5.410*         Lab 08/02/22 0602   TOTAL PROTEIN 8.1   ALBUMIN 3.70   GLOBULIN 4.4   ALT (SGPT) 13   AST (SGOT) 30   BILIRUBIN 1.4*   ALK PHOS 133*   LIPASE 17         Lab 08/02/22  0602   PROBNP 9,106.0*   TROPONIN T 0.054*         Lab 08/03/22  0517   CHOLESTEROL 82   LDL CHOL 42   HDL CHOL 23*   TRIGLYCERIDES 82             Lab 08/02/22  0704   PH, ARTERIAL 7.420   PCO2, ARTERIAL 43.8   PO2 ART 63.8*   FIO2 44   HCO3 ART 28.4*   BASE EXCESS ART 3.3*   CARBOXYHEMOGLOBIN 1.4     Brief Urine Lab Results  (Last result in the past 365 days)      Color   Clarity   Blood   Leuk Est   Nitrite   Protein   CREAT   Urine HCG        08/02/22 0629 Yellow   Cloudy   Moderate (2+)   Moderate (2+)   Negative   >=300 mg/dL (3+)                 Microbiology Results Abnormal     Procedure Component Value - Date/Time    COVID PRE-OP / PRE-PROCEDURE SCREENING ORDER (NO ISOLATION) - Swab, Nasopharynx [[id number]]  (Normal) Collected: 08/02/22 0607    Lab Status: Final result Specimen: Swab from Nasopharynx Updated: 08/02/22 0721    Narrative:      The following orders were created for panel order COVID PRE-OP / PRE-PROCEDURE SCREENING ORDER (NO ISOLATION) - Swab, Nasopharynx.  Procedure                               Abnormality         Status                     ---------                               -----------         ------                     Respiratory Panel PCR w/...[[id number]]  Normal              Final result                 Please view results for these tests on the  individual orders.    Respiratory Panel PCR w/COVID-19(SARS-CoV-2) BRENTON/ELLA/HAILEE/PAD/COR/MAD/DEANN In-House, NP Swab in UTM/VTM, 3-4 HR TAT - Swab, Nasopharynx [[id number]]  (Normal) Collected: 08/02/22 0607    Lab Status: Final result Specimen: Swab from Nasopharynx Updated: 08/02/22 0721     ADENOVIRUS, PCR Not Detected     Coronavirus 229E Not Detected     Coronavirus HKU1 Not Detected     Coronavirus NL63 Not Detected     Coronavirus OC43 Not Detected     COVID19 Not Detected     Human Metapneumovirus Not Detected     Human Rhinovirus/Enterovirus Not Detected     Influenza A PCR Not Detected     Influenza B PCR Not Detected     Parainfluenza Virus 1 Not Detected     Parainfluenza Virus 2 Not Detected     Parainfluenza Virus 3 Not Detected     Parainfluenza Virus 4 Not Detected     RSV, PCR Not Detected     Bordetella pertussis pcr Not Detected     Bordetella parapertussis PCR Not Detected     Chlamydophila pneumoniae PCR Not Detected     Mycoplasma pneumo by PCR Not Detected    Narrative:      In the setting of a positive respiratory panel with a viral infection PLUS a negative procalcitonin without other underlying concern for bacterial infection, consider observing off antibiotics or discontinuation of antibiotics and continue supportive care. If the respiratory panel is positive for atypical bacterial infection (Bordetella pertussis, Chlamydophila pneumoniae, or Mycoplasma pneumoniae), consider antibiotic de-escalation to target atypical bacterial infection.          CT Chest Without Contrast Diagnostic    Result Date: 8/2/2022   DATE OF EXAM: 8/2/2022 11:14 AM  PROCEDURE: CT CHEST WO CONTRAST DIAGNOSTIC-  INDICATIONS: Sepsis; A41.9-Sepsis, unspecified organism; R65.20-Severe sepsis without septic shock; J96.01-Acute respiratory failure with hypoxia; I48.91-Unspecified atrial fibrillation; I50.33-Acute on chronic diastolic (congestive) heart failure  COMPARISON: CT chest 06/08/2022  TECHNIQUE: Routine transaxial  slices were obtained through the chest without the administration of intravenous contrast. Reconstructed coronal and sagittal images were also obtained. Automated exposure control and iterative construction methods were used.  FINDINGS: There are bibasilar effusions and atelectasis in the lower lungs. There are also atelectatic changes in the upper lobes. There is coronary artery calcification. There is a prominent precarinal lymph node which has been noted measuring 2.2 x 1.4 cm previously measuring 2.15 x 1.3 cm. This might be reactive in nature. There are smaller AP window lymph nodes.  There is a compression fracture of L1 which has been noted.      Impression: 1.  There are bibasilar effusions as well as atelectasis within both lungs. 2.  Nonspecific precarinal lymph node as well as smaller lymph nodes that may be reactive in nature. This has been suggested. 3.  Atherosclerotic change including coronary artery calcification. 4.  Unchanged L1 compression fracture.  This report was finalized on 8/2/2022 11:39 AM by Dionicio Kenney MD.      XR Chest 1 View    Result Date: 8/2/2022  EXAMINATION: XR CHEST 1 VW DATE: 8/2/2022 6:02 AM INDICATION:  Dyspnea; COMPARISON:  June 7, 2022 FINDINGS: Defibrillator pads overlying left chest limiting evaluation. No focal consolidation, pleural effusion, or pneumothorax. Cardiomediastinal silhouette  without definite interval change. No acute osseous abnormality.     Impression: 1.  No acute cardiopulmonary process. Electronically signed by:  Gabrielle Weir M.D.  8/2/2022 5:04 AM Mountain Time          I have reviewed the medications:  Scheduled Meds:cetirizine, 10 mg, Oral, Daily  insulin detemir, 10 Units, Subcutaneous, Nightly  insulin lispro, 0-7 Units, Subcutaneous, TID AC  levothyroxine, 88 mcg, Oral, Q AM  melatonin, 10 mg, Oral, Nightly  metoprolol tartrate, 25 mg, Oral, BID  montelukast, 10 mg, Oral, Nightly  nystatin, 1 application, Topical, BID  piperacillin-tazobactam,  3.375 g, Intravenous, Q8H  rivaroxaban, 15 mg, Oral, Daily With Dinner  sodium chloride, 10 mL, Intravenous, Q12H  traZODone, 50 mg, Oral, Nightly  vancomycin (dosing per levels), , Does not apply, Daily      Continuous Infusions:dilTIAZem, 5-15 mg/hr, Last Rate: Stopped (08/02/22 2145)  Pharmacy to dose vancomycin,   sodium chloride, 100 mL/hr, Last Rate: 100 mL/hr (08/02/22 2041)      PRN Meds:.•  acetaminophen  •  dextrose  •  dextrose  •  glucagon (human recombinant)  •  HYDROcodone-acetaminophen  •  ipratropium-albuterol  •  ondansetron **OR** ondansetron  •  Pharmacy to dose vancomycin  •  sodium chloride  •  sodium chloride    Assessment & Plan   Assessment & Plan     Active Hospital Problems    Diagnosis  POA   • Bacteremia due to group B Streptococcus [R78.81, B95.1]  Unknown     Priority: High   • Sepsis with acute hypoxic respiratory failure without septic shock, due to unspecified organism ([facility]) [A41.9, R65.20, J96.01]  Yes     Priority: High   • A-fib ([facility]) [I48.91]  Unknown     Priority: High   • Elevated serum creatinine [R79.89]  Unknown     Priority: Medium   • Lactic acidosis [E87.2]  Unknown     Priority: Medium   • (HFpEF) heart failure with preserved ejection fraction (HCC) [I50.30]  Unknown     Priority: Medium   • Pulmonary hypertension (HCC) [I27.20]  Unknown     Priority: Medium   • Thrombocytopenia ([facility]) [D69.6]  Unknown     Priority: Low   • Hypomagnesemia [E83.42]  Unknown     Priority: Low   • HTN (hypertension) [I10]  Unknown     Priority: Low   • HLD (hyperlipidemia) [E78.5]  Unknown     Priority: Low   • Hypothyroidism [E03.9]  Unknown     Priority: Low   • CKD (chronic kidney disease), stage II [N18.2]  Unknown     Priority: Low   • Mitral valve regurgitation [I34.0]  Unknown     Priority: Low   • T2DM (type 2 diabetes mellitus) ([facility]) [E11.9]  Unknown     Priority: Low      Resolved Hospital Problems   No resolved problems to display.        Brief Hospital Course to date:  Rosanna  NAMRATA Browne is a 89 y.o. female with PMH of HTN, HLD, T2DM on insulin, Hypothyroidism, Severe Pulmonary HTN, MVR, HFpEF, Afib on Xarelto and CKD II presented from [facility] with severe sepsis. Pt also noted to be hypoxic and in Afib with RVR.     Plan:     Severe Sepsis  Group B Strep Bacteremia  ? RLE cellulitis  -- febrile with leukocytosis and elevated lactate  -- procal elevated  -- source as of yet unclear, however, suspect urinary given UA with moderate LE, 3-5 WBCs and trace bacteria.    -- could also be respiratory, however, CXR unremarkable  -- CT chest without contrast unremarkable  -- RVP (including COVID19) negative  -- blood Cx with GBS, appears it is in all four bottles sent from admission   -- UCx PENDING (called and discussed with micro lab and they are adding it on)  -- continue Vanc/Zosyn  -- continue aggressive IVFs as BP is still borderline low  -- repeat lactate as per protocol showed improvement  -- recent GI symptoms and reported GI illness at her facility.  Will check GI PCR  -- consult ID given bacteremia.      Acute hypoxic respiratory failure  H/o Severe Pulmonary HTN  H/o HFpEF  -- pt with noted hypoxia on RA at NH.  Wears supplemental O2 continuously at baseline but unsure of how many liters  -- currently on NRB  -- suspect due aspiration from recent episodes of emesis vs flash pulm edema due to Afib with RVR. CXR does not show pulmonary edema  -- given IV Lasix in the ED, defer further diuresis for now given borderline hypotensive. Continue IVFs  -- get TTE to assess for EF  -- will do duonebs for now  -- get CT chest     Afib with RVR  -- [id number] of 5, on Xarelto at home- dose adjusted based on renal function  -- continue Metoprolol succinate as BP tolerates (will need accurate med list from NH)  -- currently on dilt gtt     T2DM  -- hold home meds  -- check HbA1C in am  -- SSI with low dose basal insulin for now     HTN  -- hold antihypertensives for now as borderline  hypotensive, will resume Metoprolol for rate control if BP can tolerate     HLD  -- LDL at goal      Elevated creatinine on CKD II  -- unsure of baseline Cr, Cr currently 1.58 from 1.3 on admission  -- monitor/renally dose meds     Hypomagnesemia  -- replace per protocol     TCP  -- mild, monitor. Likely due to sepsis?     Hypothyroidism  -- TSH elevated  --  increased home dose Synthroid from 75 mcg to 88mcg.  Needs repeat TSH in 4-6 weeks      Expected Discharge Location and Transportation: D, likely back to NH  Expected Discharge Date: 8/15/22    DVT prophylaxis:  Medical DVT prophylaxis orders are present.     AM-PAC 6 Clicks Score (PT): 13 (08/02/22 2000)    CODE STATUS:   Code Status and Medical Interventions:   Ordered at: 08/02/22 1206     Medical Intervention Limits:    NO intubation (DNI)    NO cardioversion    NO dialysis    NO vasopressors    NO artificial nutrition     Level Of Support Discussed With:    Patient     Code Status (Patient has no pulse and is not breathing):    No CPR (Do Not Attempt to Resuscitate)     Medical Interventions (Patient has pulse or is breathing):    Limited Support       Maria C Andersen MD  08/03/22

## 2022-08-03 NOTE — PLAN OF CARE
Goal Outcome Evaluation:         Pt pleasant and cooperative, rested off and on overnight. Pt on Cardizam gtt at 2.5 at start of shift. Pt was given chris dose of PO metoprolol as well, SBP was >100. Around 2130, pt BP had dropped to the 90s. Cardizem was stopped. NP was called when BP cont to drop into the 70s and 80s. 2 bolus orders of 500mls were given, each to be infused over an hour. Monitored pts BP and resp status closely. Pt BP is now in the 100s and no s/s of fluid overload noted. Pt still in Afib but HR staying <100. Pt now on 3LNC, 2L is home dose. IV abx cont, IVF cont. VSS, pt A&O, will cont to monitor.

## 2022-08-03 NOTE — NURSING NOTE
Woc consulted for: Pressure injury under nose    Wound/ Skin Assessment: Patient presents with a stiff plastic nasal cannula that is cutting into bilateral cheeks.  And right resting right up against the philtrum of the patient's nose.  When Woc removed this there was a very delineated maroon nonblanching line underneath the philtrum.  If this is a stage I and it will go away within 24hours but more more than likely this is a deep tissue injury.  This is POA as it was caught within 24 hours of the patient admission.  Patient states she does wear oxygen at home.            Recommendations/ Intervention performed: Nasal cannula switched out to the soft nasal cannula and this was padded with Lyofoam strips and silk tape.  Venelex ordered.    Head to toe Ax performed.    Additional skin issues: Patient has a start of breakdown on the left upper gluteal and bilateral gluteals present with some discoloration hard to tell if it is scar tissue from previous injury or just pink from the moisture and breakdown starting now.  Z guard will be sufficient.  Woc cleansed with blue wipes and applied Z guard.  Orders placed.    Skin interventions in place.    Pressure Injury Protocol (initiate for Ezekiel Score of 18 or less):   *Maintain good skin care, keep dry, turn q 2 hr, keep heels elevated and offloaded with heel boots.    *Apply z-guard to sacrococcygeal area/ perineal area BID or daily and PRN per incontinent episodes.  *Follow C.A.R.E protocol if medical devices (Bipap, ledezma, Ng tube, etc) are being used.     Specialty bed: Woc will order an [employer] mattress.    Woc will follow.    Please contact with questions or concerns.

## 2022-08-03 NOTE — THERAPY EVALUATION
Acute Care - Speech Language Pathology   Swallow Initial Evaluation [facility]   Clinical Swallow Evaluation       Patient Name: Rosanna Browne  : 1930  MRN: [id number]  Today's Date: 8/3/2022               Admit Date: 2022    Visit Dx:     ICD-10-CM ICD-9-CM   1. Sepsis with acute hypoxic respiratory failure without septic shock, due to unspecified organism (HCC)  A41.9 038.9    R65.20 995.91    J96.01 518.81   2. Atrial fibrillation with rapid ventricular response ([facility])  I48.91 427.31   3. Acute on chronic diastolic congestive heart failure (HCC)  I50.33 428.33     428.0     Patient Active Problem List   Diagnosis   • Sepsis with acute hypoxic respiratory failure without septic shock, due to unspecified organism (HCC)   • A-fib (HCC)   • Elevated serum creatinine   • Thrombocytopenia (HCC)   • Lactic acidosis   • Hypomagnesemia   • HTN (hypertension)   • HLD (hyperlipidemia)   • Hypothyroidism   • CKD (chronic kidney disease), stage II   • (HFpEF) heart failure with preserved ejection fraction (HCC)   • Pulmonary hypertension (HCC)   • Mitral valve regurgitation   • T2DM (type 2 diabetes mellitus) (HCC)   • Bacteremia due to group B Streptococcus     Past Medical History:   Diagnosis Date   • A-fib (HCC)    • CHF (congestive heart failure) (HCC)    • CKD (chronic kidney disease), stage II    • Diabetes (HCC)    • HLD (hyperlipidemia)    • Hypertension    • Pulmonary hypertension (HCC)    • Thyroid disease      Past Surgical History:   Procedure Laterality Date   • CHOLECYSTECTOMY     • HIP ARTHROPLASTY     • TUBAL ABDOMINAL LIGATION         SLP Recommendation and Plan  SLP Swallowing Diagnosis: suspected pharyngeal dysphagia (22 1110)  SLP Diet Recommendation: regular textures, nectar thick liquids (22 1110)  Recommended Precautions and Strategies: general aspiration precautions (22 1110)  SLP Rec. for Method of Medication Administration: meds whole, with thick liquids, meds  crushed, with pudding or applesauce, as tolerated (08/03/22 1110)     Monitor for Signs of Aspiration: yes, notify SLP if any concerns (08/03/22 1110)  Recommended Diagnostics: VFSS ([facility]) (08/03/22 1110)  Swallow Criteria for Skilled Therapeutic Interventions Met: demonstrates skilled criteria (08/03/22 1110)  Anticipated Discharge Disposition (SLP): unknown, anticipate therapy at next level of care (08/03/22 1110)  Rehab Potential/Prognosis, Swallowing: good, to achieve stated therapy goals (08/03/22 1110)     Predicted Duration Therapy Intervention (Days): until discharge (08/03/22 1110)                                         SWALLOW EVALUATION (last 72 hours)     SLP Adult Swallow Evaluation     Row Name 08/03/22 1110                   Rehab Evaluation    Document Type evaluation  -        Subjective Information no complaints  -        Patient Observations alert;cooperative  -        Patient/Family/Caregiver Comments/Observations no family present  -        Patient Effort good  -        Symptoms Noted During/After Treatment none  -                  General Information    Patient Profile Reviewed yes  -        Pertinent History Of Current Problem Adm w/ respiratory distress. CXR: bibasilar effusions/ atelectasis in both lungs.  Hx: CHF, A-fib, HLD, HTN, thyroid disease, CKD  -        Current Method of Nutrition regular textures;thin liquids  -        Precautions/Limitations, Vision WFL;for purposes of eval  -        Precautions/Limitations, Hearing WFL;for purposes of eval  -        Prior Level of Function-Communication unknown  -        Prior Level of Function-Swallowing unknown  -        Plans/Goals Discussed with patient  -        Barriers to Rehab medically complex  -        Patient's Goals for Discharge patient did not state  -                  Pain    Additional Documentation Pain Scale: FACES Pre/Post-Treatment (Group)  -                  Pain Scale: FACES  "Pre/Post-Treatment    Pain: FACES Scale, Pretreatment 0-->no hurt  -        Posttreatment Pain Rating 0-->no hurt  -                  Oral Motor Structure and Function    Dentition Assessment missing teeth;teeth are in poor condition  -        Secretion Management WNL/WFL  -        Mucosal Quality moist, healthy  -                  Oral Musculature and Cranial Nerve Assessment    Oral Motor General Assessment [facility]                  General Eating/Swallowing Observations    Respiratory Support Currently in Use nasal cannula  -        Eating/Swallowing Skills self-fed;fed by SLP  -        Positioning During Eating upright in chair  -        Utensils Used spoon;cup;straw  -        Consistencies Trialed ice chips;thin liquids;nectar/syrup-thick liquids;pureed;regular textures  -                  Respiratory    Respiratory Status [facility]                  Clinical Swallow Eval    Pharyngeal Phase suspected pharyngeal impairment  -        Clinical Swallow Evaluation Summary Pt w/ overt s/s of aspiration. Cough w/ initial trial of thin liquid via cup sip, cough w/ thin via straw in ~50% of trials. No overt s/s w/ NTL, pureed, or regular solid. Pt reports \"coughing & choking” w/ thin liquids prior to admission. Provided education regarding overt s/s observed at bedside and associated risk for aspiration. Pt agreeable to proceed w/ instrumental to r/o pharyngeal impairment. Rec: regular solids and NTL until [facility] this PM  -                  SLP Evaluation Clinical Impression    SLP Swallowing Diagnosis suspected pharyngeal dysphagia  -        Functional Impact risk of aspiration/pneumonia  -        Rehab Potential/Prognosis, Swallowing good, to achieve stated therapy goals  -        Swallow Criteria for Skilled Therapeutic Interventions Met demonstrates skilled criteria  -                  Recommendations    Predicted Duration Therapy Intervention (Days) until discharge  -        SLP Diet " Recommendation regular textures;nectar thick liquids  -        Recommended Diagnostics VFSS ([facility])  -        Recommended Precautions and Strategies general aspiration precautions  -        Oral Care Recommendations Oral Care BID/PRN  -        SLP Rec. for Method of Medication Administration meds whole;with thick liquids;meds crushed;with pudding or applesauce;as tolerated  -        Monitor for Signs of Aspiration yes;notify SLP if any concerns  -        Anticipated Discharge Disposition (SLP) unknown;anticipate therapy at next level of care  -              User Key  (r) = Recorded By, (t) = Taken By, (c) = Cosigned By    Initials Name Effective Dates     Judi Andersen MS, DEIRDRE-SLP 06/22/22 -                 EDUCATION  The patient has been educated in the following areas:   Dysphagia (Swallowing Impairment) Modified Diet Instruction.              Time Calculation:    Time Calculation- SLP     Row Name 08/03/22 1357             Time Calculation- [facility]    SLP Start Time 1110  -      SLP Received On 08/03/22  -              Untimed Charges    63048-OE Eval Oral Pharyng Swallow Minutes 50  -              Total Minutes    Untimed Charges Total Minutes 50  -       Total Minutes 50  -            User Key  (r) = Recorded By, (t) = Taken By, (c) = Cosigned By    Initials Name Provider Type     Judi Andersen, MS, CFY-SLP Speech and Language Pathologist                Therapy Charges for Today     Code Description Service Date Service Provider Modifiers Qty    [id number] HC ST EVAL ORAL PHARYNG SWALLOW 3 8/3/2022 Judi Andersen MS, CFY-SLP GN 1            Patient was not wearing a face mask and did not exhibit coughing during this therapy encounter.  Procedure performed was not aerosolizing, did not involve close contact (within 6 feet for at least 15 minutes or longer), and did not involve contact with infectious secretions or specimens.  Therapist used appropriate personal protective  equipment including gloves, standard procedure mask and eye protection.  Appropriate PPE was worn during the entire therapy session.  Hand hygiene was completed before and after therapy session.       Judi Andersen MS, CFY-SLP  8/3/2022

## 2022-08-03 NOTE — CONSULTS
INFECTIOUS DISEASE CONSULT/INITIAL HOSPITAL VISIT    Rosanna Browne  5/5/1930  [id number]    Date of Consult: 8/3/2022    Admission Date: 8/2/2022      Requesting Provider: Saeed Bush MD  Evaluating Physician: Maria De Jesus Esparza MD    Reason for Consultation: Grp B strep sepsis    History of present illness:    Patient is a 89 y.o. female with extensive comorbidity including uncontrolled DM, MR, Cor pulmonale, Afib, HFpED and CKDII transferred from [facility] 8/2 with a 5 day history of progressive dyspnea and multiple prior episodes of vomiting. Her daughter is not present at the bedside and some details of her history are inconsistent with history provided yesterday When asked about her medical history she talks about hospitalizations in the 90's  WBC 15.9, plt 137, BNP 9,106, L.A. 2.9-> 3.4-> 1.9  Procalcitonin 4.8 CRP 13  Creat 1.37-> 1.58   Chest CT with bibasilar atelectasis and pleural effusions    Past Medical History:   Diagnosis Date   • A-fib (HCC)    • CHF (congestive heart failure) (HCC)    • CKD (chronic kidney disease), stage II    • Diabetes (HCC)    • HLD (hyperlipidemia)    • Hypertension    • Pulmonary hypertension (HCC)     Chronic LE edema     Chronic respiratory failure on home O2    • Thyroid disease        Past Surgical History:   Procedure Laterality Date   • CHOLECYSTECTOMY     • HIP ARTHROPLASTY- bilateral     • TUBAL ABDOMINAL LIGATION         History reviewed. No pertinent family history.    Social History     Socioeconomic History   • Marital status:    Tobacco Use   • Smoking status: Former Smoker     Types: Cigarettes   • Smokeless tobacco: Never Used   Substance and Sexual Activity   • Alcohol use: Never   • Drug use: Never       Allergies   Allergen Reactions   • Lisinopril Unknown - High Severity         Medication:    Current Facility-Administered Medications:   •  acetaminophen (TYLENOL) tablet 650 mg, 650 mg, Oral, Q6H PRN, Maria C Andersen,  MD  •  cetirizine (zyrTEC) tablet 10 mg, 10 mg, Oral, Daily, Maria C Andersen MD, 10 mg at 08/03/22 0844  •  dextrose (D50W) (25 g/50 mL) IV injection 25 g, 25 g, Intravenous, Q15 Min PRN, Maria C Andersen MD  •  dextrose (GLUTOSE) oral gel 15 g, 15 g, Oral, Q15 Min PRN, Maria C Andersen MD  •  dilTIAZem (CARDIZEM) 125 mg in 125 mL 0.7% sodium chloride  infusion, 5-15 mg/hr, Intravenous, Titrated, Saeed Bush MD, Stopped at 08/02/22 2145  •  glucagon (human recombinant) (GLUCAGEN DIAGNOSTIC) injection 1 mg, 1 mg, Intramuscular, Q15 Min PRN, Maria C Andersen MD  •  HYDROcodone-acetaminophen (NORCO) 5-325 MG per tablet 1 tablet, 1 tablet, Oral, Q4H PRN, Maria C Andersen MD  •  insulin detemir (LEVEMIR) injection 10 Units, 10 Units, Subcutaneous, Nightly, Maria C Andersen MD, 10 Units at 08/02/22 2125  •  Insulin Lispro (humaLOG) injection 0-7 Units, 0-7 Units, Subcutaneous, TID AC, Maria C Andersen MD, 2 Units at 08/03/22 1228  •  ipratropium-albuterol (DUO-NEB) nebulizer solution 3 mL, 3 mL, Nebulization, Q4H PRN, Maria C Andersen MD  •  levothyroxine (SYNTHROID, LEVOTHROID) tablet 88 mcg, 88 mcg, Oral, Q AM, Maria C Andersen MD, 88 mcg at 08/03/22 0628  •  melatonin tablet 10 mg, 10 mg, Oral, Nightly, Maria C Andersen MD, 10 mg at 08/02/22 2036  •  metoprolol tartrate (LOPRESSOR) tablet 25 mg, 25 mg, Oral, BID, Maria C Andersen MD, 25 mg at 08/03/22 0844  •  montelukast (SINGULAIR) tablet 10 mg, 10 mg, Oral, Nightly, Maria C Andersen MD, 10 mg at 08/02/22 2036  •  nystatin (MYCOSTATIN) powder 1 application, 1 application, Topical, BID, Maria C Andersen MD, 1 application at 08/03/22 0845  •  ondansetron (ZOFRAN) tablet 4 mg, 4 mg, Oral, Q6H PRN **OR** ondansetron (ZOFRAN) injection 4 mg, 4 mg, Intravenous, Q6H PRN, Maria C Andersen MD  •  piperacillin-tazobactam (ZOSYN) 3.375 g in iso-osmotic dextrose 50 ml (premix), 3.375 g,  Intravenous, Q8H, Misty Singh PharmD, 3.375 g at 08/03/22 0844  •  rivaroxaban (XARELTO) tablet 15 mg, 15 mg, Oral, Daily With Dinner, Maria C Andersen MD, 15 mg at 08/02/22 1713  •  sodium chloride 0.9 % flush 1-10 mL, 1-10 mL, Intravenous, PRN, Maria C Andersen MD  •  sodium chloride 0.9 % flush 10 mL, 10 mL, Intravenous, PRN, Saeed Bush MD  •  sodium chloride 0.9 % flush 10 mL, 10 mL, Intravenous, Q12H, Maria C Andersen MD, 10 mL at 08/03/22 0845  •  sodium chloride 0.9 % infusion, 100 mL/hr, Intravenous, Continuous, Maria C Andersen MD, Last Rate: 100 mL/hr at 08/02/22 2041, 100 mL/hr at 08/02/22 2041  •  traZODone (DESYREL) tablet 50 mg, 50 mg, Oral, Nightly, Maria C Andersen MD, 50 mg at 08/02/22 2036    Antibiotics:  Anti-Infectives (From admission, onward)    Ordered     Dose/Rate Route Frequency Start Stop    08/02/22 1416  piperacillin-tazobactam (ZOSYN) 3.375 g in iso-osmotic dextrose 50 ml (premix)        Ordering Provider: Misty Singh PharmD    3.375 g  over 4 Hours Intravenous Every 8 Hours Scheduled 08/02/22 1515 08/09/22 1559    08/02/22 0606  vancomycin 1750 mg/500 mL 0.9% NS IVPB ([facility])        Ordering Provider: Luis Fernnado Serrano MD    20 mg/kg × 83.9 kg  over 105 Minutes Intravenous Once 08/02/22 0608 08/02/22 0914    08/02/22 0606  piperacillin-tazobactam (ZOSYN) 3.375 g in iso-osmotic dextrose 50 ml (premix)        Ordering Provider: Luis Fernando Serrano MD    3.375 g  over 30 Minutes Intravenous Once 08/02/22 0608 08/02/22 0723            Review of Systems:  Constitutional-- No fever, chills or sweats.  + fatigue.  HEENT--   Denies odynophagia or dysphagia. No headache Hard of hearing  CV-- No chest pain, palpitation or syncope  Resp-- +SOB and cough; no hemoptysis  GI- + nausea & vomiting PTA, + diarrhea which started today.    -- No dysuria, hematuria, or flank pain.  Denies hesitancy, urgency or flank pain.  .   Heme- + OAC  MS-- no  swelling or pain in the bones or joints of arms/legs.  No new back pain.  Neuro-- No acute focal weakness or numbness in the arms or legs.  No seizures.  Skin--No rashes or lesions      Physical Exam:   Vital Signs  Temp (24hrs), Av °F (36.7 °C), Min:97.5 °F (36.4 °C), Max:99 °F (37.2 °C)    Temp  Min: 97.5 °F (36.4 °C)  Max: 99 °F (37.2 °C)  BP  Min: 68/46  Max: 114/68  Pulse  Min: 77  Max: 117  Resp  Min: 20  Max: 20  SpO2  Min: 90 %  Max: 97 %    GENERAL: Awake and alert, in mild distress and tachypneic on 4L O2. Cooperative but very hard of hearing  HEENT: Normocephalic, atraumatic.  PERRL. EOMI. No conjunctival injection. No icterus. Oropharynx  without thrush or exudate.     NECK: Supple without nuchal rigidity. No mass.    HEART: irregular rhythm; heart sounds distant  LUNGS: Clear to auscultation bilaterally without wheezing, rales, rhonchi. Normal respiratory effort. Nonlabored. No dullness.  ABDOMEN: Soft, nontender, nondistended. Positive bowel sounds. No rebound or guarding.   EXT: right leg erythematous distal to the knee witherythema and induration extending up her medial right thigh, changes of stasis dermatitis distal legs and impetigo-like wounds right shin   :  Without [place] catheter.  MSK:  No deformity  SKIN: right leg with erythema, and healed excoriations right shin  NEURO: nonfocal  PSYCHIATRIC: Normal insight and judgement. Cooperative with PE    Laboratory Data    Results from last 7 days   Lab Units 22  0517 22  0602   WBC 10*3/mm3 15.54* 15.98*   HEMOGLOBIN g/dL 11.7* 13.5   HEMATOCRIT % 37.2 43.3   PLATELETS 10*3/mm3 119* 137*     Results from last 7 days   Lab Units 22  0517   SODIUM mmol/L 137   POTASSIUM mmol/L 3.8   CHLORIDE mmol/L 98   CO2 mmol/L 25.0   BUN mg/dL 47*   CREATININE mg/dL 1.58*   GLUCOSE mg/dL 110*   CALCIUM mg/dL 7.2*     Results from last 7 days   Lab Units 22  0602   ALK PHOS U/L 133*   BILIRUBIN mg/dL 1.4*   ALT (SGPT) U/L 13   AST  (SGOT) U/L 30         Results from last 7 days   Lab Units 08/02/22  0602   CRP mg/dL 13.77*     Results from last 7 days   Lab Units 08/03/22  0016   LACTATE mmol/L 1.9         Results from last 7 days   Lab Units 08/03/22  0517   VANCOMYCIN RM mcg/mL 12.30     Estimated Creatinine Clearance: 24.8 mL/min (A) (by C-G formula based on SCr of 1.58 mg/dL (H)).      Microbiology:  Blood Culture   Date Value Ref Range Status   08/02/2022 Streptococcus agalactiae (Group B) (C)  Preliminary     BCID, PCR   Date Value Ref Range Status   08/02/2022 (A) Negative by BCID PCR. Culture to Follow. Final    Streptococcus agalactiae (Group B). Identification by BCID2 PCR.     No results found for: CULTURES, HSVCX, URCX  No results found for: EYECULTURE, GCCX, HSVCULTURE, LABHSV  No results found for: LEGIONELLA, MRSACX, MUMPSCX, MYCOPLASCX  No results found for: NOCARDIACX, STOOLCX  No results found for: THROATCX, UNSTIMCULT, URINECX, CULTURE, VZVCULTUR  No results found for: VIRALCULTU, WOUNDCX        Radiology:  Imaging Results (Last 72 Hours)     Procedure Component Value Units Date/Time    CT Chest Without Contrast Diagnostic [[id number]] Collected: 08/02/22 1133     Updated: 08/02/22 1142    Narrative:         DATE OF EXAM:  8/2/2022 11:14 AM     PROCEDURE:  CT CHEST WO CONTRAST DIAGNOSTIC-     INDICATIONS:   Sepsis; A41.9-Sepsis, unspecified organism; R65.20-Severe sepsis without  septic shock; J96.01-Acute respiratory failure with hypoxia;  I48.91-Unspecified atrial fibrillation; I50.33-Acute on chronic  diastolic (congestive) heart failure     COMPARISON:   CT chest 06/08/2022     TECHNIQUE:  Routine transaxial slices were obtained through the chest without the  administration of intravenous contrast. Reconstructed coronal and  sagittal images were also obtained. Automated exposure control and  iterative construction methods were used.      FINDINGS:  There are bibasilar effusions and atelectasis in the lower lungs.  There  are also atelectatic changes in the upper lobes. There is coronary  artery calcification. There is a prominent precarinal lymph node which  has been noted measuring 2.2 x 1.4 cm previously measuring 2.15 x 1.3  cm. This might be reactive in nature. There are smaller AP window lymph  nodes.     There is a compression fracture of L1 which has been noted.       Impression:      1.  There are bibasilar effusions as well as atelectasis within both  lungs.  2.  Nonspecific precarinal lymph node as well as smaller lymph nodes  that may be reactive in nature. This has been suggested.  3.  Atherosclerotic change including coronary artery calcification.  4.  Unchanged L1 compression fracture.     This report was finalized on 8/2/2022 11:39 AM by Dionciio Kenney MD.       XR Chest 1 View [[id number]] Collected: 08/02/22 0703     Updated: 08/02/22 0705    Narrative:      EXAMINATION: XR CHEST 1 VW    DATE: 8/2/2022 6:02 AM    INDICATION:  Dyspnea;    COMPARISON:  June 7, 2022    FINDINGS:  Defibrillator pads overlying left chest limiting evaluation.    No focal consolidation, pleural effusion, or pneumothorax.    Cardiomediastinal silhouette  without definite interval change.    No acute osseous abnormality.          Impression:        1.  No acute cardiopulmonary process.          Electronically signed by:  Gabrielle Weir M.D.    8/2/2022 5:04 AM [place] Time            Impression:     -- Septic shock with hypotension, lactic acidosis, LEAH   -- Grp B strep sepsis  Suspect that the right leg is the source although uti also possible  Endocarditis is a potential complication  -- Atrial fibrillation on xarelto  -- Acute on chronic respiratory failure Bilateral pleural effusions and BNP 9,106  -- Pulmonary HTN  -- DM T2- uncontrolled A1c 9  -- Mitral regurgitation  -- HFpEF  -- Acute on chronic kidney disease  -- recent Nausea and Vomiting- evidently multiple residents had these symptoms  No evidence of aspiration pneumonia by  CT    PLAN/RECOMMENDATIONS:   Thank you for asking us to see Rosanna Browne, I recommend the following:    -- Simplify antibiotics to zosyn  Consider changing to ceftriaxone if diarrhea persists on zosyn  -- Agree with echo   -- GI panel ordered  -- Venous doppler although the likelihood of DVT on xarelto is fairly low  -- probiotic while on antibiotics    Prognosis guarded/poor       Maria De Jesus Esparza MD  8/3/2022  13:40 EDT

## 2022-08-03 NOTE — PLAN OF CARE
Goal Outcome Evaluation:  Plan of Care Reviewed With: patient, daughter     Pt awake and alert throughout the day. VSS. Pt remains in Afib, but rate much improved. IV abx infusing. Magnesium replaced today. Pt on 3L NC. Pt went down for MBS today and passed with no issue. Plan for BLE duplex. Will continue to monitor.   Problem: Adult Inpatient Plan of Care  Goal: Plan of Care Review  Outcome: Ongoing, Progressing  Flowsheets (Taken 8/3/2022 7320)  Progress: improving  Plan of Care Reviewed With:   patient   daughter        Progress: improving

## 2022-08-03 NOTE — PLAN OF CARE
Goal Outcome Evaluation:  Plan of Care Reviewed With: patient        Progress: no change  Outcome Evaluation: Pt. presents with impaired activity tolerance, balance, generalized weakness, and a decline in ADL performance. Encouragement needed throughout session. Pt. required Mod A x 2 for bed mobility and T/Fs. DEP for LBD and SUA to wash face. Skilled OT services warranted to promote return to PLOF. Recommend SNF at discharge.

## 2022-08-03 NOTE — THERAPY EVALUATION
Patient Name: Rosanna Browne  : 1930    MRN: [id number]                              Today's Date: 8/3/2022       Admit Date: 2022    Visit Dx:     ICD-10-CM ICD-9-CM   1. Sepsis with acute hypoxic respiratory failure without septic shock, due to unspecified organism (HCC)  A41.9 038.9    R65.20 995.91    J96.01 518.81   2. Atrial fibrillation with rapid ventricular response (HCC)  I48.91 427.31   3. Acute on chronic diastolic congestive heart failure (HCC)  I50.33 428.33     428.0     Patient Active Problem List   Diagnosis   • Sepsis with acute hypoxic respiratory failure without septic shock, due to unspecified organism (HCC)   • A-fib (HCC)   • Elevated serum creatinine   • Thrombocytopenia (HCC)   • Lactic acidosis   • Hypomagnesemia   • HTN (hypertension)   • HLD (hyperlipidemia)   • Hypothyroidism   • CKD (chronic kidney disease), stage II   • (HFpEF) heart failure with preserved ejection fraction (HCC)   • Pulmonary hypertension (HCC)   • Mitral valve regurgitation   • T2DM (type 2 diabetes mellitus) (HCC)   • Bacteremia due to group B Streptococcus     Past Medical History:   Diagnosis Date   • A-fib (HCC)    • CHF (congestive heart failure) (HCC)    • CKD (chronic kidney disease), stage II    • Diabetes (HCC)    • HLD (hyperlipidemia)    • Hypertension    • Pulmonary hypertension (HCC)    • Thyroid disease      Past Surgical History:   Procedure Laterality Date   • CHOLECYSTECTOMY     • HIP ARTHROPLASTY     • TUBAL ABDOMINAL LIGATION        General Information     Row Name 22 1102          OT Time and Intention    Document Type evaluation  -LC     Mode of Treatment occupational therapy  -     Row Name 22 1102          General Information    Patient Profile Reviewed yes  -LC     Prior Level of Function dependent:;w/c or scooter;min assist:;all household mobility;transfer;ADL's  -LC     Existing Precautions/Restrictions fall;oxygen therapy device and L/min  -LC     Barriers to  Rehab medically complex;previous functional deficit  -     Row Name 08/03/22 1102          Living Environment    People in Home facility resident  -     Row Name 08/03/22 1102          Home Main Entrance    Number of Stairs, Main Entrance none  -     Row Name 08/03/22 1102          Stairs Within Home, Primary    Number of Stairs, Within Home, Primary none  -     Row Name 08/03/22 1102          Cognition    Orientation Status (Cognition) oriented to;person;situation;time;verbal cues/prompts needed for orientation;place  -     Row Name 08/03/22 1102          Safety Issues, Functional Mobility    Safety Issues Affecting Function (Mobility) awareness of need for assistance;insight into deficits/self-awareness;safety precaution awareness;safety precautions follow-through/compliance;sequencing abilities  -     Impairments Affecting Function (Mobility) balance;endurance/activity tolerance;pain;postural/trunk control;strength;shortness of breath  -     Comment, Safety Issues/Impairments (Mobility) Encouragement needed throughout session.  -           User Key  (r) = Recorded By, (t) = Taken By, (c) = Cosigned By    Initials Name Provider Type     Silvana Weber OT Occupational Therapist                 Mobility/ADL's     Row Name 08/03/22 1110          Bed Mobility    Bed Mobility supine-sit;scooting/bridging  -     Scooting/Bridging [place] (Bed Mobility) moderate assist (50% patient effort);2 person assist;verbal cues  -     Supine-Sit [place] (Bed Mobility) moderate assist (50% patient effort);2 person assist;verbal cues  -     Assistive Device (Bed Mobility) bed rails;head of bed elevated  -     Comment, (Bed Mobility) VC's to sequence BLE to EOB and bring trunk into upright position.  -     Row Name 08/03/22 1110          Transfers    Transfers sit-stand transfer;bed-chair transfer  -     Bed-Chair [place] (Transfers) moderate assist (50% patient effort);2 person  assist;verbal cues  -     Assistive Device (Bed-Chair Transfers) other (see comments)  BUE support  -     Sit-Stand [place] (Transfers) moderate assist (50% patient effort);2 person assist;verbal cues  -[facility] Name 08/03/22 1110          Sit-Stand Transfer    Assistive Device (Sit-Stand Transfers) other (see comments)  BUE support  -[facility] Name 08/03/22 1110          Activities of Daily Living    BADL Assessment/Intervention lower body dressing;grooming  -[facility] Name 08/03/22 1110          Lower Body Dressing Assessment/Training    [place] Level (Lower Body Dressing) don;socks;dependent (less than 25% patient effort)  -[facility] Name 08/03/22 1110          Grooming Assessment/Training    [place] Level (Grooming) wash face, hands;set up  -           User Key  (r) = Recorded By, (t) = Taken By, (c) = Cosigned By    Initials Name Provider Type     Silvana Weber OT Occupational Therapist               Obj/Interventions     [facility] Name 08/03/22 1113          Sensory Assessment (Somatosensory)    Sensory Assessment (Somatosensory) UE sensation intact  -LC     Row Name 08/03/22 1113          Vision Assessment/Intervention    Visual Impairment/Limitations corrective lenses full-time  -[facility] Name 08/03/22 1113          Range of Motion Comprehensive    Comment, General Range of Motion BUE grossly WFL  -[facility] Name 08/03/22 1113          Strength Comprehensive (MMT)    Comment, General Manual Muscle Testing (MMT) Assessment Difficult to assess due to limited participation, anticipate BUE grossly 3+/5  -[facility] Name 08/03/22 1113          Balance    Balance Assessment sitting static balance;sitting dynamic balance;standing dynamic balance;standing static balance  -     Static Sitting Balance contact guard  -     Dynamic Sitting Balance minimal assist  -     Position, Sitting Balance supported;sitting edge of bed  -     Static Standing Balance moderate assist;2-person assist   -     Dynamic Standing Balance moderate assist;2-person assist  -     Position/Device Used, Standing Balance supported  -     Balance Interventions sitting;standing;sit to stand;supported;weight shifting activity  -     Comment, Balance Cues needed to correct posture to improve stability in standing.  -           User Key  (r) = Recorded By, (t) = Taken By, (c) = Cosigned By    Initials Name Provider Type     Silvana Weber OT Occupational Therapist               Goals/Plan     Row Name 08/03/22 1121          Transfer Goal 1 (OT)    Activity/Assistive Device (Transfer Goal 1, OT) sit-to-stand/stand-to-sit;bed-to-chair/chair-to-bed  -     [place] Level/Cues Needed (Transfer Goal 1, OT) minimum assist (75% or more patient effort);verbal cues required  -     Time Frame (Transfer Goal 1, OT) 10 days;long term goal (LTG)  -LC     Progress/Outcome (Transfer Goal 1, OT) goal ongoing  -     Row Name 08/03/22 1121          Dressing Goal 1 (OT)    Activity/Device (Dressing Goal 1, OT) upper body dressing  -     [place]/Cues Needed (Dressing Goal 1, OT) minimum assist (75% or more patient effort);verbal cues required  -     Time Frame (Dressing Goal 1, OT) 10 days;long term goal (LTG)  -LC     Progress/Outcome (Dressing Goal 1, OT) goal ongoing  -     Row Name 08/03/22 1121          Toileting Goal 1 (OT)    Activity/Device (Toileting Goal 1, OT) adjust/manage clothing;perform perineal hygiene;commode  -     [place] Level/Cues Needed (Toileting Goal 1, OT) verbal cues required;minimum assist (75% or more patient effort)  -     Time Frame (Toileting Goal 1, OT) 10 days;long term goal (LTG)  -LC     Progress/Outcome (Toileting Goal 1, OT) goal ongoing  -           User Key  (r) = Recorded By, (t) = Taken By, (c) = Cosigned By    Initials Name Provider Type     Silvana Weber OT Occupational Therapist               Clinical Impression     Row Name 08/03/22 1117          Pain  Assessment    Pretreatment Pain Rating 5/10  -LC     Posttreatment Pain Rating 5/10  -LC     Pain Location - Side/Orientation Bilateral  -LC     Pain Location lower  -LC     Pain Location - extremity  -LC     Pain Intervention(s) Repositioned;Ambulation/increased activity;Elevated  -     Additional Documentation Pain Scale: Word Pre/Post-Treatment (Group)  -     Row Name 08/03/22 1117          Plan of Care Review    Plan of Care Reviewed With patient  -LC     Progress no change  -     Outcome Evaluation Pt. presents with impaired activity tolerance, balance, generalized weakness, and a decline in ADL performance. Encouragement needed throughout session. Pt. required Mod A x 2 for bed mobility and T/Fs. DEP for LBD and SUA to wash face. Skilled OT services warranted to promote return to PLOF. Recommend SNF at discharge.  -     Row Name 08/03/22 1117          Therapy Assessment/Plan (OT)    Patient/Family Therapy Goal Statement (OT) To return to PLOF  -     Therapy Frequency (OT) daily  -     Row Name 08/03/22 1117          Therapy Plan Review/Discharge Plan (OT)    Anticipated Discharge Disposition (OT) skilled nursing facility  -     Row Name 08/03/22 1117          Vital Signs    Pre Systolic BP Rehab 99  -LC     Pre Treatment Diastolic BP 61  -LC     Post Systolic BP Rehab 104  -LC     Post Treatment Diastolic BP 63  -LC     Pretreatment Heart Rate (beats/min) 94  -LC     Posttreatment Heart Rate (beats/min) 99  -LC     Pre SpO2 (%) 95  -LC     O2 Delivery Pre Treatment supplemental O2  -LC     Post SpO2 (%) 95  -LC     O2 Delivery Post Treatment supplemental O2  -LC     Pre Patient Position Supine  -LC     Intra Patient Position Standing  -LC     Post Patient Position Sitting  -     Row Name 08/03/22 1117          Positioning and Restraints    Pre-Treatment Position in bed  -LC     Post Treatment Position chair  -LC     In Chair notified nsg;reclined;call light within reach;encouraged to call for  assist;exit alarm on;waffle cushion;legs elevated  -           User Key  (r) = Recorded By, (t) = Taken By, (c) = Cosigned By    Initials Name Provider Type    Silvana Blanco OT Occupational Therapist               Outcome Measures     Row Name 08/03/22 1121          How much help from another is currently needed...    Putting on and taking off regular lower body clothing? 1  -LC     Bathing (including washing, rinsing, and drying) 2  -LC     Toileting (which includes using toilet bed pan or urinal) 1  -     Putting on and taking off regular upper body clothing 2  -LC     Taking care of personal grooming (such as brushing teeth) 3  -LC     Eating meals 3  -     AM-PAC 6 Clicks Score (OT) 12  -     Row Name 08/03/22 1121          Functional Assessment    Outcome Measure Options AM-PAC 6 Clicks Daily Activity (OT)  -           User Key  (r) = Recorded By, (t) = Taken By, (c) = Cosigned By    Initials Name Provider Type    Silvana Blanco OT Occupational Therapist                Occupational Therapy Education                 Title: PT OT SLP Therapies (In Progress)     Topic: Occupational Therapy (In Progress)     Point: ADL training (In Progress)     Description:   Instruct learner(s) on proper safety adaptation and remediation techniques during self care or transfers.   Instruct in proper use of assistive devices.              Learning Progress Summary           Patient Acceptance, E, NR,NL by  at 8/3/2022 1000                   Point: Home exercise program (Not Started)     Description:   Instruct learner(s) on appropriate technique for monitoring, assisting and/or progressing therapeutic exercises/activities.              Learner Progress:  Not documented in this visit.          Point: Precautions (In Progress)     Description:   Instruct learner(s) on prescribed precautions during self-care and functional transfers.              Learning Progress Summary           Patient Acceptance, E, NR,NL by  HALINA at 8/3/2022 1000                   Point: Body mechanics (In Progress)     Description:   Instruct learner(s) on proper positioning and spine alignment during self-care, functional mobility activities and/or exercises.              Learning Progress Summary           Patient Acceptance, E, NR,NL by  at 8/3/2022 1000                               User Key     Initials Effective Dates Name Provider Type Discipline     06/16/21 -  Silvana Weber OT Occupational Therapist OT              OT Recommendation and Plan  Therapy Frequency (OT): daily  Plan of Care Review  Plan of Care Reviewed With: patient  Progress: no change  Outcome Evaluation: Pt. presents with impaired activity tolerance, balance, generalized weakness, and a decline in ADL performance. Encouragement needed throughout session. Pt. required Mod A x 2 for bed mobility and T/Fs. DEP for LBD and SUA to wash face. Skilled OT services warranted to promote return to PLOF. Recommend SNF at discharge.     Time Calculation:    Time Calculation- OT     Row Name 08/03/22 1123             Time Calculation- OT    OT Start Time 1000  -LC      OT Received On 08/03/22  -      OT Goal Re-Cert Due Date 08/13/22  -              Untimed Charges    OT Eval/Re-eval Minutes 52  -LC              Total Minutes    Untimed Charges Total Minutes 52  -LC       Total Minutes 52  -LC            User Key  (r) = Recorded By, (t) = Taken By, (c) = Cosigned By    Initials Name Provider Type     Silvana Weber OT Occupational Therapist              Therapy Charges for Today     Code Description Service Date Service Provider Modifiers Qty    [id number] HC OT EVAL LOW COMPLEXITY 4 8/3/2022 Silvana Weber OT GO 1               Silvana Weber OT  8/3/2022

## 2022-08-03 NOTE — PLAN OF CARE
Goal Outcome Evaluation:  SLP evaluation completed. Will continue to address dysphagia. Please see note for further details and recommendations.

## 2022-08-03 NOTE — DISCHARGE INSTR - DIET
MBS/VFSS 8/3/22  Reason for Referral  Patient was referred for a MBS to assess the efficiency of his/her swallow function, rule out aspiration and make recommendations regarding safe dietary consistencies, effective compensatory strategies, and safe eating environment.             Recommendations/Treatment  SLP Swallowing Diagnosis: functional oral phase, functional pharyngeal phase, other (see comments) (grossly functional for pt's age) (08/03/22 1420)  Functional Impact: no impact on function (08/03/22 1420)  Rehab Potential/Prognosis, Swallowing: good, to achieve stated therapy goals (08/03/22 1420)  Swallow Criteria for Skilled Therapeutic Interventions Met: no problems identified which require skilled intervention, baseline status (08/03/22 1420)  Therapy Frequency (Swallow): evaluation only (08/03/22 1420)  SLP Diet Recommendation: regular textures, thin liquids (08/03/22 1420)  Recommended Precautions and Strategies: upright posture during/after eating, general aspiration precautions, reflux precautions (08/03/22 1420)  SLP Rec. for Method of Medication Administration: meds whole, with thin liquids, with pudding or applesauce, as tolerated (08/03/22 1420)  Monitor for Signs of Aspiration: yes, notify SLP if any concerns (08/03/22 1420)  Anticipated Discharge Disposition (SLP): unknown (08/03/22 1420)    Instrumental Set-up  Utensils Used: spoon, cup, straw (08/03/22 1420)  Consistencies Trialed: thin liquids, pudding thick, regular textures (08/03/22 1420)    Oral Preparation/ Oral Phase  Oral Prep Phase: WFL (08/03/22 1420)  Oral Residue: WFL (08/03/22 1420)  Oral Phase, Comment: Functional oral phase of the swallow. No significant oral weakness identified. (08/03/22 1420)             Pharyngeal Phase  Initiation of Pharyngeal Swallow: bolus in pyriform sinuses (08/03/22 1420)  Penetration During the Swallow: thin liquids, secondary to delayed swallow initiation or mistiming, other (see comments) (high  penetration that cleared; functional for pt's age) (08/03/22 1420)  Response to Penetration: transient (08/03/22 1420)  Pharyngeal Phase, Comment: Grossly functional pharyngeal swallow. Swallow initiation was to the pyriform sinuses. Penetration during the swallow w/ thin liquids that was high & cleared upon completion of the swallow. This was functional for pt's age. No aspiration observed w/ any consistency during exam. No significant pharyngeal residue or weakness identified. (08/03/22 1420)    Cervical Esophageal Phase  Esophageal Phase: see radiology report for further details (08/03/22 1420)

## 2022-08-03 NOTE — PROGRESS NOTES
"Pharmacy Consult-Vancomycin Dosing  Rosanna Browne is a  89 y.o. female receiving vancomycin therapy.     Indication: Sepsis  Consulting Provider: Hospitalist  ID Consult: No    Goal Trough: 15-20 mcg/mL    Current Antimicrobial Therapy  Anti-Infectives (From admission, onward)      Ordered     Dose/Rate Route Frequency Start Stop    08/02/22 1420  vancomycin (dosing per levels)        Ordering Provider: Misty Singh PharmD     Does not apply Daily 08/03/22 0900 08/07/22 0859    08/02/22 1416  piperacillin-tazobactam (ZOSYN) 3.375 g in iso-osmotic dextrose 50 ml (premix)        Ordering Provider: Misty Singh PharmD    3.375 g  over 4 Hours Intravenous Every 8 Hours Scheduled 08/02/22 1515 08/09/22 1559    08/02/22 1341  Pharmacy to dose vancomycin        Ordering Provider: Maria C Andersen MD     Does not apply Continuous PRN 08/02/22 1341 08/09/22 1340    08/02/22 0606  vancomycin 1750 mg/500 mL 0.9% NS IVPB ([facility])        Ordering Provider: Luis Fernando Serrano MD    20 mg/kg × 83.9 kg  over 105 Minutes Intravenous Once 08/02/22 0608 08/02/22 0914    08/02/22 0606  piperacillin-tazobactam (ZOSYN) 3.375 g in iso-osmotic dextrose 50 ml (premix)        Ordering Provider: Luis Fernando Serrano MD    3.375 g  over 30 Minutes Intravenous Once 08/02/22 0608 08/02/22 0723          Allergies  Allergies as of 08/02/2022 - Reviewed 08/02/2022   Allergen Reaction Noted    Lisinopril Unknown - High Severity 08/02/2022     Labs    Results from last 7 days   Lab Units 08/03/22  0517 08/02/22  0602   BUN mg/dL 47* 43*   CREATININE mg/dL 1.58* 1.37*     Results from last 7 days   Lab Units 08/03/22  0517 08/02/22  0602   WBC 10*3/mm3 15.54* 15.98*     Evaluation of Dosing     Is Patient on Dialysis or Renal Replacement: No (LEAH on admission)    Ht - 167.6 cm (66\")  Wt - 83.9 kg (185 lb)    Estimated Creatinine Clearance: 24.8 mL/min (A) (by C-G formula based on SCr of 1.58 mg/dL (H)).    Intake & Output " (last 3 days)         07/31 0701  08/01 0700 08/01 0701  08/02 0700 08/02 0701  08/03 0700 08/03 0701 08/04 0700    IV Piggyback  250 1000     Total Intake(mL/kg)  250 (3) 1000 (11.9)     Net  +250 +1000                   Microbiology and Radiology  Microbiology Results (last 10 days)       Procedure Component Value - Date/Time    Blood Culture - Blood, Arm, Right [[id number]]  (Abnormal) Collected: 08/02/22 0640    Lab Status: Preliminary result Specimen: Blood from Arm, Right Updated: 08/03/22 0936     Blood Culture Streptococcus agalactiae (Group B)     Isolated from Aerobic and Anaerobic Bottles     Gram Stain Anaerobic Bottle Gram positive cocci in pairs and chains      Aerobic Bottle Gram positive cocci in pairs and chains    Blood Culture ID, PCR - Blood, Arm, Right [[id number]]  (Abnormal) Collected: 08/02/22 0640    Lab Status: Final result Specimen: Blood from Arm, Right Updated: 08/02/22 2021     BCID, PCR Streptococcus agalactiae (Group B). Identification by BCID2 PCR.    Blood Culture - Blood, Arm, Left [[id number]]  (Abnormal) Collected: 08/02/22 0630    Lab Status: Preliminary result Specimen: Blood from Arm, Left Updated: 08/03/22 0937     Blood Culture Streptococcus agalactiae (Group B)     Isolated from Aerobic and Anaerobic Bottles     Gram Stain Anaerobic Bottle Gram positive cocci in pairs and chains      Aerobic Bottle Gram positive cocci in pairs and chains    COVID PRE-OP / PRE-PROCEDURE SCREENING ORDER (NO ISOLATION) - Swab, Nasopharynx [[id number]]  (Normal) Collected: 08/02/22 0607    Lab Status: Final result Specimen: Swab from Nasopharynx Updated: 08/02/22 0721    Narrative:      The following orders were created for panel order COVID PRE-OP / PRE-PROCEDURE SCREENING ORDER (NO ISOLATION) - Swab, Nasopharynx.  Procedure                               Abnormality         Status                     ---------                               -----------         ------                      Respiratory Panel PCR w/...[[id number]]  Normal              Final result                 Please view results for these tests on the individual orders.    Respiratory Panel PCR w/COVID-19(SARS-CoV-2) BRENTON/ELLA/HAILEE/PAD/COR/MAD/DEANN In-House, NP Swab in UTM/VTM, 3-4 HR TAT - Swab, Nasopharynx [[id number]]  (Normal) Collected: 08/02/22 0607    Lab Status: Final result Specimen: Swab from Nasopharynx Updated: 08/02/22 0721     ADENOVIRUS, PCR Not Detected     Coronavirus 229E Not Detected     Coronavirus HKU1 Not Detected     Coronavirus NL63 Not Detected     Coronavirus OC43 Not Detected     COVID19 Not Detected     Human Metapneumovirus Not Detected     Human Rhinovirus/Enterovirus Not Detected     Influenza A PCR Not Detected     Influenza B PCR Not Detected     Parainfluenza Virus 1 Not Detected     Parainfluenza Virus 2 Not Detected     Parainfluenza Virus 3 Not Detected     Parainfluenza Virus 4 Not Detected     RSV, PCR Not Detected     Bordetella pertussis pcr Not Detected     Bordetella parapertussis PCR Not Detected     Chlamydophila pneumoniae PCR Not Detected     Mycoplasma pneumo by PCR Not Detected    Narrative:      In the setting of a positive respiratory panel with a viral infection PLUS a negative procalcitonin without other underlying concern for bacterial infection, consider observing off antibiotics or discontinuation of antibiotics and continue supportive care. If the respiratory panel is positive for atypical bacterial infection (Bordetella pertussis, Chlamydophila pneumoniae, or Mycoplasma pneumoniae), consider antibiotic de-escalation to target atypical bacterial infection.          Vancomycin Levels:  Results from last 7 days   Lab Units 08/03/22  0517   VANCOMYCIN RM mcg/mL 12.30             Assessment/Plan:    Pharmacy to dose vancomycin for sepsis.  Patient received a loading dose of vancomycin 1750 mg IV x 1. Due to advanced age and LEAH on admission, will dose further per levels.   Vanc AM  random 8/3 came back as 12.3. Serum creatinine bumped overnight to 1.58 from 1.37. Given this, will give one time dose of 750mg IV and repeat random level AM of 08/04.  Monitor renal function, cultures and sensitivities, and clinical status, and adjust regimen as necessary.  Pharmacy will continue to follow.    Thanks,    Wayne Perez, PharmD  Pharmacy Resident  8/3/2022  11:53 EDT

## 2022-08-03 NOTE — PLAN OF CARE
Goal Outcome Evaluation:  Plan of Care Reviewed With: patient           Outcome Evaluation: Pt. presents with generalized weakness, balance deficits and decreased activity tolerance affecting her ability to safely participate in functional mobility. She performed bed mobility with mod assist of 2. She performed sit to stand and bed to chair transfer w/mod assist of 2. Pt. requires encourgement for participation. Recommend SNF upon discharge.

## 2022-08-04 ENCOUNTER — APPOINTMENT (OUTPATIENT)
Dept: CARDIOLOGY | Facility: HOSPITAL | Age: 87
End: 2022-08-04

## 2022-08-04 ENCOUNTER — APPOINTMENT (OUTPATIENT)
Dept: GENERAL RADIOLOGY | Facility: HOSPITAL | Age: 87
End: 2022-08-04

## 2022-08-04 LAB
ADV 40+41 DNA STL QL NAA+NON-PROBE: NOT DETECTED
ALBUMIN SERPL-MCNC: 2.9 G/DL (ref 3.5–5.2)
ALBUMIN/GLOB SERPL: 0.7 G/DL
ALP SERPL-CCNC: 136 U/L (ref 39–117)
ALT SERPL W P-5'-P-CCNC: 12 U/L (ref 1–33)
ANION GAP SERPL CALCULATED.3IONS-SCNC: 11 MMOL/L (ref 5–15)
AST SERPL-CCNC: 25 U/L (ref 1–32)
ASTRO TYP 1-8 RNA STL QL NAA+NON-PROBE: NOT DETECTED
BACTERIA SPEC AEROBE CULT: ABNORMAL
BACTERIA SPEC AEROBE CULT: ABNORMAL
BASOPHILS # BLD AUTO: 0.04 10*3/MM3 (ref 0–0.2)
BASOPHILS NFR BLD AUTO: 0.3 % (ref 0–1.5)
BH CV LOWER VASCULAR LEFT COMMON FEMORAL AUGMENT: NORMAL
BH CV LOWER VASCULAR LEFT COMMON FEMORAL COMPRESS: NORMAL
BH CV LOWER VASCULAR LEFT COMMON FEMORAL PHASIC: NORMAL
BH CV LOWER VASCULAR LEFT COMMON FEMORAL SPONT: NORMAL
BH CV LOWER VASCULAR LEFT DISTAL FEMORAL AUGMENT: NORMAL
BH CV LOWER VASCULAR LEFT DISTAL FEMORAL COMPRESS: NORMAL
BH CV LOWER VASCULAR LEFT DISTAL FEMORAL PHASIC: NORMAL
BH CV LOWER VASCULAR LEFT DISTAL FEMORAL SPONT: NORMAL
BH CV LOWER VASCULAR LEFT GASTRONEMIUS COMPRESS: NORMAL
BH CV LOWER VASCULAR LEFT GREATER SAPH AK COMPRESS: NORMAL
BH CV LOWER VASCULAR LEFT GREATER SAPH BK COMPRESS: NORMAL
BH CV LOWER VASCULAR LEFT LESSER SAPH COMPRESS: NORMAL
BH CV LOWER VASCULAR LEFT MID FEMORAL AUGMENT: NORMAL
BH CV LOWER VASCULAR LEFT MID FEMORAL COMPRESS: NORMAL
BH CV LOWER VASCULAR LEFT MID FEMORAL PHASIC: NORMAL
BH CV LOWER VASCULAR LEFT MID FEMORAL SPONT: NORMAL
BH CV LOWER VASCULAR LEFT PERONEAL COMPRESS: NORMAL
BH CV LOWER VASCULAR LEFT POPLITEAL AUGMENT: NORMAL
BH CV LOWER VASCULAR LEFT POPLITEAL COMPRESS: NORMAL
BH CV LOWER VASCULAR LEFT POPLITEAL PHASIC: NORMAL
BH CV LOWER VASCULAR LEFT POPLITEAL SPONT: NORMAL
BH CV LOWER VASCULAR LEFT POSTERIOR TIBIAL COMPRESS: NORMAL
BH CV LOWER VASCULAR LEFT PROFUNDA FEMORAL AUGMENT: NORMAL
BH CV LOWER VASCULAR LEFT PROFUNDA FEMORAL COMPRESS: NORMAL
BH CV LOWER VASCULAR LEFT PROFUNDA FEMORAL PHASIC: NORMAL
BH CV LOWER VASCULAR LEFT PROFUNDA FEMORAL SPONT: NORMAL
BH CV LOWER VASCULAR LEFT PROXIMAL FEMORAL AUGMENT: NORMAL
BH CV LOWER VASCULAR LEFT PROXIMAL FEMORAL COMPRESS: NORMAL
BH CV LOWER VASCULAR LEFT PROXIMAL FEMORAL PHASIC: NORMAL
BH CV LOWER VASCULAR LEFT PROXIMAL FEMORAL SPONT: NORMAL
BH CV LOWER VASCULAR LEFT SAPHENOFEMORAL JUNCTION AUGMENT: NORMAL
BH CV LOWER VASCULAR LEFT SAPHENOFEMORAL JUNCTION COMPRESS: NORMAL
BH CV LOWER VASCULAR LEFT SAPHENOFEMORAL JUNCTION PHASIC: NORMAL
BH CV LOWER VASCULAR LEFT SAPHENOFEMORAL JUNCTION SPONT: NORMAL
BH CV LOWER VASCULAR RIGHT COMMON FEMORAL AUGMENT: NORMAL
BH CV LOWER VASCULAR RIGHT COMMON FEMORAL COMPRESS: NORMAL
BH CV LOWER VASCULAR RIGHT COMMON FEMORAL PHASIC: NORMAL
BH CV LOWER VASCULAR RIGHT COMMON FEMORAL SPONT: NORMAL
BH CV LOWER VASCULAR RIGHT DISTAL FEMORAL AUGMENT: NORMAL
BH CV LOWER VASCULAR RIGHT DISTAL FEMORAL COMPRESS: NORMAL
BH CV LOWER VASCULAR RIGHT DISTAL FEMORAL PHASIC: NORMAL
BH CV LOWER VASCULAR RIGHT DISTAL FEMORAL SPONT: NORMAL
BH CV LOWER VASCULAR RIGHT GASTRONEMIUS COMPRESS: NORMAL
BH CV LOWER VASCULAR RIGHT GREATER SAPH AK COMPRESS: NORMAL
BH CV LOWER VASCULAR RIGHT GREATER SAPH BK COMPRESS: NORMAL
BH CV LOWER VASCULAR RIGHT LESSER SAPH COMPRESS: NORMAL
BH CV LOWER VASCULAR RIGHT MID FEMORAL AUGMENT: NORMAL
BH CV LOWER VASCULAR RIGHT MID FEMORAL COMPRESS: NORMAL
BH CV LOWER VASCULAR RIGHT MID FEMORAL PHASIC: NORMAL
BH CV LOWER VASCULAR RIGHT MID FEMORAL SPONT: NORMAL
BH CV LOWER VASCULAR RIGHT PERONEAL COMPRESS: NORMAL
BH CV LOWER VASCULAR RIGHT POPLITEAL AUGMENT: NORMAL
BH CV LOWER VASCULAR RIGHT POPLITEAL COMPRESS: NORMAL
BH CV LOWER VASCULAR RIGHT POPLITEAL PHASIC: NORMAL
BH CV LOWER VASCULAR RIGHT POPLITEAL SPONT: NORMAL
BH CV LOWER VASCULAR RIGHT POSTERIOR TIBIAL COMPRESS: NORMAL
BH CV LOWER VASCULAR RIGHT PROFUNDA FEMORAL AUGMENT: NORMAL
BH CV LOWER VASCULAR RIGHT PROFUNDA FEMORAL COMPRESS: NORMAL
BH CV LOWER VASCULAR RIGHT PROFUNDA FEMORAL PHASIC: NORMAL
BH CV LOWER VASCULAR RIGHT PROFUNDA FEMORAL SPONT: NORMAL
BH CV LOWER VASCULAR RIGHT PROXIMAL FEMORAL AUGMENT: NORMAL
BH CV LOWER VASCULAR RIGHT PROXIMAL FEMORAL COMPRESS: NORMAL
BH CV LOWER VASCULAR RIGHT PROXIMAL FEMORAL PHASIC: NORMAL
BH CV LOWER VASCULAR RIGHT PROXIMAL FEMORAL SPONT: NORMAL
BH CV LOWER VASCULAR RIGHT SAPHENOFEMORAL JUNCTION AUGMENT: NORMAL
BH CV LOWER VASCULAR RIGHT SAPHENOFEMORAL JUNCTION COMPRESS: NORMAL
BH CV LOWER VASCULAR RIGHT SAPHENOFEMORAL JUNCTION PHASIC: NORMAL
BH CV LOWER VASCULAR RIGHT SAPHENOFEMORAL JUNCTION SPONT: NORMAL
BILIRUB SERPL-MCNC: 0.6 MG/DL (ref 0–1.2)
BUN SERPL-MCNC: 48 MG/DL (ref 8–23)
BUN/CREAT SERPL: 34.3 (ref 7–25)
C CAYETANENSIS DNA STL QL NAA+NON-PROBE: NOT DETECTED
C COLI+JEJ+UPSA DNA STL QL NAA+NON-PROBE: NOT DETECTED
CALCIUM SPEC-SCNC: 7.4 MG/DL (ref 8.2–9.6)
CHLORIDE SERPL-SCNC: 97 MMOL/L (ref 98–107)
CO2 SERPL-SCNC: 23 MMOL/L (ref 22–29)
CREAT SERPL-MCNC: 1.4 MG/DL (ref 0.57–1)
CRYPTOSP DNA STL QL NAA+NON-PROBE: NOT DETECTED
D DIMER PPP FEU-MCNC: 4.62 MCGFEU/ML (ref 0.01–0.5)
DEPRECATED RDW RBC AUTO: 48.8 FL (ref 37–54)
E HISTOLYT DNA STL QL NAA+NON-PROBE: NOT DETECTED
EAEC PAA PLAS AGGR+AATA ST NAA+NON-PRB: NOT DETECTED
EC STX1+STX2 GENES STL QL NAA+NON-PROBE: NOT DETECTED
EGFRCR SERPLBLD CKD-EPI 2021: 35.4 ML/MIN/1.73
EOSINOPHIL # BLD AUTO: 0.1 10*3/MM3 (ref 0–0.4)
EOSINOPHIL NFR BLD AUTO: 0.8 % (ref 0.3–6.2)
EPEC EAE GENE STL QL NAA+NON-PROBE: NOT DETECTED
ERYTHROCYTE [DISTWIDTH] IN BLOOD BY AUTOMATED COUNT: 15.3 % (ref 12.3–15.4)
ETEC LTA+ST1A+ST1B TOX ST NAA+NON-PROBE: NOT DETECTED
G LAMBLIA DNA STL QL NAA+NON-PROBE: NOT DETECTED
GLOBULIN UR ELPH-MCNC: 4.3 GM/DL
GLUCOSE BLDC GLUCOMTR-MCNC: 102 MG/DL (ref 70–130)
GLUCOSE BLDC GLUCOMTR-MCNC: 146 MG/DL (ref 70–130)
GLUCOSE BLDC GLUCOMTR-MCNC: 157 MG/DL (ref 70–130)
GLUCOSE BLDC GLUCOMTR-MCNC: 197 MG/DL (ref 70–130)
GLUCOSE SERPL-MCNC: 122 MG/DL (ref 65–99)
GRAM STN SPEC: ABNORMAL
HCT VFR BLD AUTO: 39.1 % (ref 34–46.6)
HGB BLD-MCNC: 12 G/DL (ref 12–15.9)
IMM GRANULOCYTES # BLD AUTO: 0.05 10*3/MM3 (ref 0–0.05)
IMM GRANULOCYTES NFR BLD AUTO: 0.4 % (ref 0–0.5)
ISOLATED FROM: ABNORMAL
ISOLATED FROM: ABNORMAL
LYMPHOCYTES # BLD AUTO: 0.97 10*3/MM3 (ref 0.7–3.1)
LYMPHOCYTES NFR BLD AUTO: 8.1 % (ref 19.6–45.3)
MAGNESIUM SERPL-MCNC: 2.2 MG/DL (ref 1.7–2.3)
MAXIMAL PREDICTED HEART RATE: 128 BPM
MCH RBC QN AUTO: 26.5 PG (ref 26.6–33)
MCHC RBC AUTO-ENTMCNC: 30.7 G/DL (ref 31.5–35.7)
MCV RBC AUTO: 86.5 FL (ref 79–97)
MONOCYTES # BLD AUTO: 0.62 10*3/MM3 (ref 0.1–0.9)
MONOCYTES NFR BLD AUTO: 5.2 % (ref 5–12)
NEUTROPHILS NFR BLD AUTO: 10.16 10*3/MM3 (ref 1.7–7)
NEUTROPHILS NFR BLD AUTO: 85.2 % (ref 42.7–76)
NOROVIRUS GI+II RNA STL QL NAA+NON-PROBE: NOT DETECTED
NRBC BLD AUTO-RTO: 0 /100 WBC (ref 0–0.2)
P SHIGELLOIDES DNA STL QL NAA+NON-PROBE: NOT DETECTED
PLATELET # BLD AUTO: 125 10*3/MM3 (ref 140–450)
PMV BLD AUTO: 10.5 FL (ref 6–12)
POTASSIUM SERPL-SCNC: 3.5 MMOL/L (ref 3.5–5.2)
PROT SERPL-MCNC: 7.2 G/DL (ref 6–8.5)
RBC # BLD AUTO: 4.52 10*6/MM3 (ref 3.77–5.28)
RVA RNA STL QL NAA+NON-PROBE: NOT DETECTED
S ENT+BONG DNA STL QL NAA+NON-PROBE: NOT DETECTED
SAPO I+II+IV+V RNA STL QL NAA+NON-PROBE: NOT DETECTED
SHIGELLA SP+EIEC IPAH ST NAA+NON-PROBE: NOT DETECTED
SODIUM SERPL-SCNC: 131 MMOL/L (ref 136–145)
STRESS TARGET HR: 109 BPM
V CHOL+PARA+VUL DNA STL QL NAA+NON-PROBE: NOT DETECTED
V CHOLERAE DNA STL QL NAA+NON-PROBE: NOT DETECTED
WBC NRBC COR # BLD: 11.94 10*3/MM3 (ref 3.4–10.8)
Y ENTEROCOL DNA STL QL NAA+NON-PROBE: NOT DETECTED

## 2022-08-04 PROCEDURE — 71045 X-RAY EXAM CHEST 1 VIEW: CPT

## 2022-08-04 PROCEDURE — 25010000002 CEFEPIME PER 500 MG: Performed by: INTERNAL MEDICINE

## 2022-08-04 PROCEDURE — 93005 ELECTROCARDIOGRAM TRACING: CPT | Performed by: INTERNAL MEDICINE

## 2022-08-04 PROCEDURE — 93010 ELECTROCARDIOGRAM REPORT: CPT | Performed by: INTERNAL MEDICINE

## 2022-08-04 PROCEDURE — 87507 IADNA-DNA/RNA PROBE TQ 12-25: CPT | Performed by: INTERNAL MEDICINE

## 2022-08-04 PROCEDURE — 80053 COMPREHEN METABOLIC PANEL: CPT | Performed by: INTERNAL MEDICINE

## 2022-08-04 PROCEDURE — 63710000001 INSULIN LISPRO (HUMAN) PER 5 UNITS: Performed by: INTERNAL MEDICINE

## 2022-08-04 PROCEDURE — 63710000001 INSULIN DETEMIR PER 5 UNITS: Performed by: INTERNAL MEDICINE

## 2022-08-04 PROCEDURE — 99233 SBSQ HOSP IP/OBS HIGH 50: CPT | Performed by: INTERNAL MEDICINE

## 2022-08-04 PROCEDURE — 82962 GLUCOSE BLOOD TEST: CPT

## 2022-08-04 PROCEDURE — 94640 AIRWAY INHALATION TREATMENT: CPT

## 2022-08-04 PROCEDURE — 85379 FIBRIN DEGRADATION QUANT: CPT | Performed by: INTERNAL MEDICINE

## 2022-08-04 PROCEDURE — 83735 ASSAY OF MAGNESIUM: CPT | Performed by: INTERNAL MEDICINE

## 2022-08-04 PROCEDURE — 93970 EXTREMITY STUDY: CPT

## 2022-08-04 PROCEDURE — 25010000002 PIPERACILLIN SOD-TAZOBACTAM PER 1 G

## 2022-08-04 PROCEDURE — 93970 EXTREMITY STUDY: CPT | Performed by: INTERNAL MEDICINE

## 2022-08-04 PROCEDURE — 85025 COMPLETE CBC W/AUTO DIFF WBC: CPT | Performed by: INTERNAL MEDICINE

## 2022-08-04 RX ADMIN — CASTOR OIL AND BALSAM, PERU 1 APPLICATION: 788; 87 OINTMENT TOPICAL at 21:24

## 2022-08-04 RX ADMIN — TAZOBACTAM SODIUM AND PIPERACILLIN SODIUM 3.38 G: 375; 3 INJECTION, SOLUTION INTRAVENOUS at 00:58

## 2022-08-04 RX ADMIN — CETIRIZINE HYDROCHLORIDE TABLETS 10 MG: 10 TABLET, FILM COATED ORAL at 09:18

## 2022-08-04 RX ADMIN — NYSTATIN 1 APPLICATION: 100000 POWDER TOPICAL at 09:18

## 2022-08-04 RX ADMIN — INSULIN DETEMIR 10 UNITS: 100 INJECTION, SOLUTION SUBCUTANEOUS at 21:25

## 2022-08-04 RX ADMIN — RIVAROXABAN 15 MG: 15 TABLET, FILM COATED ORAL at 18:07

## 2022-08-04 RX ADMIN — NYSTATIN 1 APPLICATION: 100000 POWDER TOPICAL at 21:25

## 2022-08-04 RX ADMIN — LEVOTHYROXINE SODIUM 88 MCG: 88 TABLET ORAL at 09:19

## 2022-08-04 RX ADMIN — Medication 10 MG: at 21:24

## 2022-08-04 RX ADMIN — CEFEPIME 2 G: 2 INJECTION, POWDER, FOR SOLUTION INTRAVENOUS at 16:27

## 2022-08-04 RX ADMIN — METOPROLOL TARTRATE 25 MG: 25 TABLET, FILM COATED ORAL at 21:24

## 2022-08-04 RX ADMIN — ACETAMINOPHEN 325MG 650 MG: 325 TABLET ORAL at 13:13

## 2022-08-04 RX ADMIN — TAZOBACTAM SODIUM AND PIPERACILLIN SODIUM 3.38 G: 375; 3 INJECTION, SOLUTION INTRAVENOUS at 09:18

## 2022-08-04 RX ADMIN — METOPROLOL TARTRATE 25 MG: 25 TABLET, FILM COATED ORAL at 09:18

## 2022-08-04 RX ADMIN — Medication 10 ML: at 21:25

## 2022-08-04 RX ADMIN — CEFEPIME HYDROCHLORIDE 1 G: 1 INJECTION, POWDER, FOR SOLUTION INTRAMUSCULAR; INTRAVENOUS at 23:51

## 2022-08-04 RX ADMIN — MONTELUKAST 10 MG: 10 TABLET, FILM COATED ORAL at 21:24

## 2022-08-04 RX ADMIN — CASTOR OIL AND BALSAM, PERU 1 APPLICATION: 788; 87 OINTMENT TOPICAL at 09:18

## 2022-08-04 RX ADMIN — IPRATROPIUM BROMIDE AND ALBUTEROL SULFATE 3 ML: .5; 3 SOLUTION RESPIRATORY (INHALATION) at 23:59

## 2022-08-04 RX ADMIN — TRAZODONE HYDROCHLORIDE 50 MG: 50 TABLET ORAL at 21:24

## 2022-08-04 RX ADMIN — Medication 10 ML: at 09:18

## 2022-08-04 RX ADMIN — INSULIN LISPRO 2 UNITS: 100 INJECTION, SOLUTION INTRAVENOUS; SUBCUTANEOUS at 18:08

## 2022-08-04 NOTE — PROGRESS NOTES
[facility] Medicine Services  PROGRESS NOTE    Patient Name: Rosanna Browne  : 1930  MRN: [id number]    Date of Admission: 2022  Primary Care Physician: Red Perez MD    Subjective   Subjective     CC:  Severe sepsis    HPI:  No new issues overnight. Feeling well this am. Still having diarrhea.     ROS:  Gen- No fevers, chills  CV- No chest pain, palpitations  Resp- No cough, dyspnea  GI- No N/V, abd pain      Objective   Objective     Vital Signs:   Temp:  [97 °F (36.1 °C)-98.4 °F (36.9 °C)] 97.5 °F (36.4 °C)  Heart Rate:  [] 90  Resp:  [18-20] 18  BP: ()/(54-79) 111/79  Flow (L/min):  [3-4] 4     Physical Exam:  Constitutional: No acute distress, awake, alert, [place]  HENT: NCAT, mucous membranes moist  Respiratory: Clear to auscultation bilaterally, respiratory effort normal   Cardiovascular: RRR, no murmurs, rubs, or gallops  Gastrointestinal: Positive bowel sounds, soft, nontender, nondistended  Musculoskeletal: BLE nonpitting edema, RLE with erythema to mid thigh (pt reports this is chronic)  Psychiatric: Appropriate affect, cooperative  Neurologic: Oriented x 3, strength symmetric in all extremities, Cranial Nerves grossly intact to confrontation, speech clear  Skin: No rashes, chronic RLE erythema     Results Reviewed:  LAB RESULTS:      Lab 22  0544 22  0517 22  0016 22  1637 22  1250 22  0938 22  0602   WBC 11.94* 15.54*  --   --   --   --  15.98*   HEMOGLOBIN 12.0 11.7*  --   --   --   --  13.5   HEMATOCRIT 39.1 37.2  --   --   --   --  43.3   PLATELETS 125* 119*  --   --   --   --  137*   NEUTROS ABS 10.16*  --   --   --   --   --  14.26*   IMMATURE GRANS (ABS) 0.05  --   --   --   --   --  0.16*   LYMPHS ABS 0.97  --   --   --   --   --  1.04   MONOS ABS 0.62  --   --   --   --   --  0.46   EOS ABS 0.10  --   --   --   --   --  0.02   MCV 86.5 85.9  --   --   --   --  85.7   CRP  --   --   --   --   --    --  13.77*   PROCALCITONIN  --   --   --   --   --   --  4.82*   LACTATE  --   --  1.9 2.3* 3.4* 3.1* 2.9*   D DIMER QUANT 4.62*  --   --   --   --   --   --          Lab 08/04/22  0544 08/03/22  0517 08/02/22  0602   SODIUM 131* 137 133*   POTASSIUM 3.5 3.8 4.2   CHLORIDE 97* 98 92*   CO2 23.0 25.0 27.0   ANION GAP 11.0 14.0 14.0   BUN 48* 47* 43*   CREATININE 1.40* 1.58* 1.37*   EGFR 35.4* 30.6* 36.3*   GLUCOSE 122* 110* 250*   CALCIUM 7.4* 7.2* 8.6   MAGNESIUM 2.2 1.6* 1.5*   PHOSPHORUS  --   --  2.9   HEMOGLOBIN A1C  --  9.00*  --    TSH  --   --  5.410*         Lab 08/04/22  0544 08/02/22  0602   TOTAL PROTEIN 7.2 8.1   ALBUMIN 2.90* 3.70   GLOBULIN 4.3 4.4   ALT (SGPT) 12 13   AST (SGOT) 25 30   BILIRUBIN 0.6 1.4*   ALK PHOS 136* 133*   LIPASE  --  17         Lab 08/02/22  0602   PROBNP 9,106.0*   TROPONIN T 0.054*         Lab 08/03/22  0517   CHOLESTEROL 82   LDL CHOL 42   HDL CHOL 23*   TRIGLYCERIDES 82             Lab 08/02/22  0704   PH, ARTERIAL 7.420   PCO2, ARTERIAL 43.8   PO2 ART 63.8*   FIO2 44   HCO3 ART 28.4*   BASE EXCESS ART 3.3*   CARBOXYHEMOGLOBIN 1.4     Brief Urine Lab Results  (Last result in the past 365 days)      Color   Clarity   Blood   Leuk Est   Nitrite   Protein   CREAT   Urine HCG        08/02/22 0629 Yellow   Cloudy   Moderate (2+)   Moderate (2+)   Negative   >=300 mg/dL (3+)                 Microbiology Results Abnormal     Procedure Component Value - Date/Time    COVID PRE-OP / PRE-PROCEDURE SCREENING ORDER (NO ISOLATION) - Swab, Nasopharynx [[id number]]  (Normal) Collected: 08/02/22 0607    Lab Status: Final result Specimen: Swab from Nasopharynx Updated: 08/02/22 0721    Narrative:      The following orders were created for panel order COVID PRE-OP / PRE-PROCEDURE SCREENING ORDER (NO ISOLATION) - Swab, Nasopharynx.  Procedure                               Abnormality         Status                     ---------                               -----------         ------                      Respiratory Panel PCR w/...[[id number]]  Normal              Final result                 Please view results for these tests on the individual orders.    Respiratory Panel PCR w/COVID-19(SARS-CoV-2) BRENTON/ELLA/HAILEE/PAD/COR/MAD/DEANN In-House, NP Swab in UTM/VTM, 3-4 HR TAT - Swab, Nasopharynx [[id number]]  (Normal) Collected: 08/02/22 0607    Lab Status: Final result Specimen: Swab from Nasopharynx Updated: 08/02/22 0721     ADENOVIRUS, PCR Not Detected     Coronavirus 229E Not Detected     Coronavirus HKU1 Not Detected     Coronavirus NL63 Not Detected     Coronavirus OC43 Not Detected     COVID19 Not Detected     Human Metapneumovirus Not Detected     Human Rhinovirus/Enterovirus Not Detected     Influenza A PCR Not Detected     Influenza B PCR Not Detected     Parainfluenza Virus 1 Not Detected     Parainfluenza Virus 2 Not Detected     Parainfluenza Virus 3 Not Detected     Parainfluenza Virus 4 Not Detected     RSV, PCR Not Detected     Bordetella pertussis pcr Not Detected     Bordetella parapertussis PCR Not Detected     Chlamydophila pneumoniae PCR Not Detected     Mycoplasma pneumo by PCR Not Detected    Narrative:      In the setting of a positive respiratory panel with a viral infection PLUS a negative procalcitonin without other underlying concern for bacterial infection, consider observing off antibiotics or discontinuation of antibiotics and continue supportive care. If the respiratory panel is positive for atypical bacterial infection (Bordetella pertussis, Chlamydophila pneumoniae, or Mycoplasma pneumoniae), consider antibiotic de-escalation to target atypical bacterial infection.          Adult Transthoracic Echo Complete W/ Cont if Necessary Per Protocol    Result Date: 8/3/2022  · The right atrial cavity is moderate to severely dilated. · There is a small left pleural effusion. There is a small right pleural effusion. · Moderate to severe tricuspid valve regurgitation is present. · Estimated  right ventricular systolic pressure from tricuspid regurgitation is markedly elevated (>55 mmHg). Calculated right ventricular systolic pressure from tricuspid regurgitation is 68 mmHg. · Estimated left ventricular EF = 55% Left ventricular ejection fraction appears to be 51 - 55%. Left ventricular systolic function is normal. · Moderate to severe pulmonary hypertension is present. · Systolic and diastolic flattening of the interventricular septum consistent with right ventricle pressure and volume overload. · The right ventricular cavity is moderately dilated with normal right ventricular wall thickness and severely reduced right ventricular systolic function; abnormal septal motion. · There is no evidence of pericardial effusion.      CT Chest Without Contrast Diagnostic    Result Date: 8/2/2022   DATE OF EXAM: 8/2/2022 11:14 AM  PROCEDURE: CT CHEST WO CONTRAST DIAGNOSTIC-  INDICATIONS: Sepsis; A41.9-Sepsis, unspecified organism; R65.20-Severe sepsis without septic shock; J96.01-Acute respiratory failure with hypoxia; I48.91-Unspecified atrial fibrillation; I50.33-Acute on chronic diastolic (congestive) heart failure  COMPARISON: CT chest 06/08/2022  TECHNIQUE: Routine transaxial slices were obtained through the chest without the administration of intravenous contrast. Reconstructed coronal and sagittal images were also obtained. Automated exposure control and iterative construction methods were used.  FINDINGS: There are bibasilar effusions and atelectasis in the lower lungs. There are also atelectatic changes in the upper lobes. There is coronary artery calcification. There is a prominent precarinal lymph node which has been noted measuring 2.2 x 1.4 cm previously measuring 2.15 x 1.3 cm. This might be reactive in nature. There are smaller AP window lymph nodes.  There is a compression fracture of L1 which has been noted.      Impression: 1.  There are bibasilar effusions as well as atelectasis within both  lungs. 2.  Nonspecific precarinal lymph node as well as smaller lymph nodes that may be reactive in nature. This has been suggested. 3.  Atherosclerotic change including coronary artery calcification. 4.  Unchanged L1 compression fracture.  This report was finalized on 8/2/2022 11:39 AM by Dionicio Kenney MD.      FL Video Swallow With Speech Single Contrast    Result Date: 8/3/2022  EXAMINATION: FL VIDEO SWALLOW W SPEECH SINGLE-CONTRAST-  INDICATION: dysphagia; A41.9-Sepsis, unspecified organism; R65.20-Severe sepsis without septic shock; J96.01-Acute respiratory failure with hypoxia; I48.91-Unspecified atrial fibrillation; I50.33-Acute on chronic diastolic (congestive) heart failure  TECHNIQUE: 30 seconds of fluoroscopic time was used for this exam. 6 associated fluoroscopic loops were saved. The patient was evaluated in the seated lateral position while taking a variety of consistencies of barium by mouth under the direction of speech pathology.  COMPARISON: NONE  FINDINGS: 30 seconds of fluoroscopy provided for a modified barium swallow. Please see speech therapy report for full details and recommendations.       Impression: Fluoroscopy provided for a modified barium swallow. Please see speech therapy report for full details and recommendations.    This report was finalized on 8/3/2022 3:41 PM by Seth Del Rosario MD.        Results for orders placed during the hospital encounter of 08/02/22    Adult Transthoracic Echo Complete W/ Cont if Necessary Per Protocol    Interpretation Summary  · The right atrial cavity is moderate to severely dilated.  · There is a small left pleural effusion. There is a small right pleural effusion.  · Moderate to severe tricuspid valve regurgitation is present.  · Estimated right ventricular systolic pressure from tricuspid regurgitation is markedly elevated (>55 mmHg). Calculated right ventricular systolic pressure from tricuspid regurgitation is 68 mmHg.  · Estimated left ventricular  EF = 55% Left ventricular ejection fraction appears to be 51 - 55%. Left ventricular systolic function is normal.  · Moderate to severe pulmonary hypertension is present.  · Systolic and diastolic flattening of the interventricular septum consistent with right ventricle pressure and volume overload.  · The right ventricular cavity is moderately dilated with normal right ventricular wall thickness and severely reduced right ventricular systolic function; abnormal septal motion.  · There is no evidence of pericardial effusion.      I have reviewed the medications:  Scheduled Meds:castor oil-balsam peru, 1 application, Topical, Q12H  cetirizine, 10 mg, Oral, Daily  insulin detemir, 10 Units, Subcutaneous, Nightly  insulin lispro, 0-7 Units, Subcutaneous, TID AC  levothyroxine, 88 mcg, Oral, Q AM  melatonin, 10 mg, Oral, Nightly  metoprolol tartrate, 25 mg, Oral, BID  montelukast, 10 mg, Oral, Nightly  nystatin, 1 application, Topical, BID  piperacillin-tazobactam, 3.375 g, Intravenous, Q8H  rivaroxaban, 15 mg, Oral, Daily With Dinner  sodium chloride, 10 mL, Intravenous, Q12H  traZODone, 50 mg, Oral, Nightly      Continuous Infusions:dilTIAZem, 5-15 mg/hr, Last Rate: Stopped (08/02/22 2145)      PRN Meds:.•  acetaminophen  •  dextrose  •  dextrose  •  glucagon (human recombinant)  •  HYDROcodone-acetaminophen  •  ipratropium-albuterol  •  ondansetron **OR** ondansetron  •  sodium chloride  •  sodium chloride    Assessment & Plan   Assessment & Plan     Active Hospital Problems    Diagnosis  POA   • Bacteremia due to group B Streptococcus [R78.81, B95.1]  Unknown     Priority: High   • Sepsis with acute hypoxic respiratory failure without septic shock, due to unspecified organism (HCC) [A41.9, R65.20, J96.01]  Yes     Priority: High   • A-fib (HCC) [I48.91]  Unknown     Priority: High   • Elevated serum creatinine [R79.89]  Unknown     Priority: Medium   • Lactic acidosis [E87.2]  Unknown     Priority: Medium   •  (HFpEF) heart failure with preserved ejection fraction (HCC) [I50.30]  Unknown     Priority: Medium   • Pulmonary hypertension (HCC) [I27.20]  Unknown     Priority: Medium   • Thrombocytopenia (HCC) [D69.6]  Unknown     Priority: Low   • Hypomagnesemia [E83.42]  Unknown     Priority: Low   • HTN (hypertension) [I10]  Unknown     Priority: Low   • HLD (hyperlipidemia) [E78.5]  Unknown     Priority: Low   • Hypothyroidism [E03.9]  Unknown     Priority: Low   • CKD (chronic kidney disease), stage II [N18.2]  Unknown     Priority: Low   • Mitral valve regurgitation [I34.0]  Unknown     Priority: Low   • T2DM (type 2 diabetes mellitus) (HCC) [E11.9]  Unknown     Priority: Low      Resolved Hospital Problems   No resolved problems to display.        Brief Hospital Course to date:  Rosanna Browne is a 89 y.o. female with PMH of HTN, HLD, T2DM on insulin, Hypothyroidism, Severe Pulmonary HTN, MVR, HFpEF, Afib on Xarelto and CKD II presented from [facility] [place] with severe sepsis. Pt also noted to be hypoxic and in Afib with RVR.     Plan:     Severe Sepsis  Group B Strep Bacteremia  ? RLE cellulitis  -- febrile with leukocytosis and elevated lactate  -- procal elevated  -- suspect RLE cellulitis vs urinary source given UA with moderate LE, 3-5 WBCs and trace bacteria   -- could also be respiratory, however, CXR unremarkable  -- CT chest without contrast unremarkable  -- RVP (including COVID19) negative  -- blood Cx with GBS, appears it is in all four bottles sent from admission   -- UCx PENDING (called and discussed with micro lab and they are adding it on)  -- continue Zosyn, stopped Vanc per ID  --stop IVFs as BP has improved and to avoid volume overload  -- TTE with no evidence of endocarditis.   -- repeat lactate showed improvement  -- recent GI symptoms and reported GI illness at her facility.  Will check GI PCR  -- consult ID given bacteremia. Appreciate Dr. Esparza's input     Acute hypoxic respiratory  failure  H/o Severe Pulmonary HTN  H/o HFpEF  -- pt with noted hypoxia on RA at NH.  Wears supplemental O2 continuously at baseline but unsure of how many liters  -- currently on 4L BNC  -- suspect due aspiration from recent episodes of emesis vs flash pulm edema due to Afib with RVR. CXR does not show pulmonary edema  -- given IV Lasix in the ED, defer further diuresis for now  -- TTE with moderate to severe Pulm HTN, right ventricular dysfunction, normal LVEF.   -- will do duonebs for now  -- get CT chest     Afib with RVR  -- [id number] of 5, on Xarelto at home- dose adjusted based on renal function  -- continue Metoprolol succinate as BP tolerates  -- continue dilt gtt as needed     T2DM  -- hold home meds  -- HbA1C 9% c/w poor control at home  -- consult DM educator  -- SSI with low dose basal insulin for now     HTN  -- hold antihypertensives for now as borderline hypotensive, continue Metoprolol for rate control if BP can tolerate     HLD  -- LDL at goal      Elevated creatinine on CKD II  -- unsure of baseline Cr, Cr currently 1.4 improved from yesterday   -- monitor/renally dose meds     Hypomagnesemia  -- replace per protocol     TCP  -- mild, monitor. Likely due to sepsis?     Hypothyroidism  -- TSH elevated  --  increased home dose Synthroid from 75 mcg to 88mcg.  Needs repeat TSH in 4-6 weeks      Expected Discharge Location and Transportation: D, likely back to NH  Expected Discharge Date: 8/15/22    DVT prophylaxis:  Medical DVT prophylaxis orders are present.     AM-PAC 6 Clicks Score (PT): 11 (08/03/22 2000)    CODE STATUS:   Code Status and Medical Interventions:   Ordered at: 08/02/22 1206     Medical Intervention Limits:    NO intubation (DNI)    NO cardioversion    NO dialysis    NO vasopressors    NO artificial nutrition     Level Of Support Discussed With:    Patient     Code Status (Patient has no pulse and is not breathing):    No CPR (Do Not Attempt to Resuscitate)     Medical  Interventions (Patient has pulse or is breathing):    Limited Support       Maria C Andersen MD  08/04/22

## 2022-08-04 NOTE — NURSING NOTE
VSS, pt remains in Afib with HR in the 90s-110s. Pt still needing a specialty bed, the one that was delivered last night is broken; will follow up with ordering another bed; will continue to monitor

## 2022-08-04 NOTE — PROGRESS NOTES
[facility] Progress Note    Admission Date: 8/2/2022    Rosanna Browne  5/5/1930  [id number]    Date: 8/4/2022    Antibiotics:  Anti-Infectives (From admission, onward)    Ordered     Dose/Rate Route Frequency Start Stop    08/04/22 1458  cefepime (MAXIPIME) 1 g/100 mL 0.9% NS IVPB (mbp)        Ordering Provider: Maria De Jesus Esparza MD    1 g  25 mL/hr over 4 Hours Intravenous Every 24 Hours 08/05/22 0000 08/13/22 2359    08/04/22 1458  cefepime (MAXIPIME) 2 g/100 mL 0.9% NS (mbp)        Ordering Provider: Maria De Jesus Esparza MD    2 g  200 mL/hr over 30 Minutes Intravenous Once 08/04/22 1545      08/02/22 0606  vancomycin 1750 mg/500 mL 0.9% NS IVPB ([facility])        Ordering Provider: Luis Fernando Serrano MD    20 mg/kg × 83.9 kg  over 105 Minutes Intravenous Once 08/02/22 0608 08/02/22 0914    08/02/22 0606  piperacillin-tazobactam (ZOSYN) 3.375 g in iso-osmotic dextrose 50 ml (premix)        Ordering Provider: Luis Fernando Serrano MD    3.375 g  over 30 Minutes Intravenous Once 08/02/22 0608 08/02/22 0723          Reason for Consultation: Grp B strep sepsis     History of present illness:    Patient is a 89 y.o. female with extensive comorbidity including uncontrolled DM, MR, Cor pulmonale, Afib, HFpED and CKDII transferred from [facility] 8/2 with a 5 day history of progressive dyspnea and multiple prior episodes of vomiting. Her daughter is not present at the bedside and some details of her history are inconsistent with history provided yesterday When asked about her medical history she talks about hospitalizations in the 90's  WBC 15.9, plt 137, BNP 9,106, L.A. 2.9-> 3.4-> 1.9  Procalcitonin 4.8 CRP 13  Creat 1.37-> 1.58   Chest CT with bibasilar atelectasis and pleural effusions     8/4/22 Afebrile, tired but easily awakened, states that she feels stronger and that diarrhea continues. O2 sats high 80's while I was in the room  Her daughter Chelsea is at bedside and expresses concern that she is not  receiving a lactose restricted diet. R leg erythema and pain somewhat improved  GNR from urine Venous doppler limited by patient tolerance but negative    PE:  Vital Signs  Temp  Min: 95.9 °F (35.5 °C)  Max: 98.4 °F (36.9 °C)  BP  Min: 90/54  Max: 127/76  Pulse  Min: 90  Max: 113  Resp  Min: 18  Max: 20  SpO2  Min: 88 %  Max: 98 %    GENERAL: Awake and alert, in mild distress and tachypneic on 4L O2. Cooperative but very hard of hearing  HEENT: Normocephalic, atraumatic.  PERRL. EOMI. No conjunctival injection. No icterus. Oropharynx  without thrush or exudate.     NECK: Supple without nuchal rigidity. No mass.     HEART: irregular rhythm; heart sounds distant  LUNGS: Clear to auscultation bilaterally without wheezing, rales, rhonchi. Normal respiratory effort. Nonlabored. No dullness.  ABDOMEN: Soft, nontender, nondistended. Positive bowel sounds. No rebound or guarding.   EXT: right leg erythematous distal to the knee witherythema and induration extending up her medial right thigh, changes of stasis dermatitis distal legs and impetigo-like wounds right shin   :  Without Urbina catheter.  MSK:  No deformity  SKIN: right leg with erythema, and healed excoriations right shin  NEURO: nonfocal  PSYCHIATRIC: Normal insight and judgement. Cooperative with PE       Laboratory Data    Results from last 7 days   Lab Units 08/04/22  0544 08/03/22  0517 08/02/22  0602   WBC 10*3/mm3 11.94* 15.54* 15.98*   HEMOGLOBIN g/dL 12.0 11.7* 13.5   HEMATOCRIT % 39.1 37.2 43.3   PLATELETS 10*3/mm3 125* 119* 137*     Results from last 7 days   Lab Units 08/04/22  0544   SODIUM mmol/L 131*   POTASSIUM mmol/L 3.5   CHLORIDE mmol/L 97*   CO2 mmol/L 23.0   BUN mg/dL 48*   CREATININE mg/dL 1.40*   GLUCOSE mg/dL 122*   CALCIUM mg/dL 7.4*     Results from last 7 days   Lab Units 08/04/22  0544   ALK PHOS U/L 136*   BILIRUBIN mg/dL 0.6   ALT (SGPT) U/L 12   AST (SGOT) U/L 25         Results from last 7 days   Lab Units 08/02/22  0602   CRP  mg/dL 13.77*       Estimated Creatinine Clearance: 28 mL/min (A) (by C-G formula based on SCr of 1.4 mg/dL (H)).      Microbiology:  Grp B strep from blood  GNR from urine    Radiology:  Imaging Results (Last 72 Hours)     Procedure Component Value Units Date/Time    FL Video Swallow With Speech Single Contrast [[id number]] Collected: 08/03/22 1513     Updated: 08/03/22 1545    Narrative:      EXAMINATION: FL VIDEO SWALLOW W SPEECH SINGLE-CONTRAST-     INDICATION: dysphagia; A41.9-Sepsis, unspecified organism; R65.20-Severe  sepsis without septic shock; J96.01-Acute respiratory failure with  hypoxia; I48.91-Unspecified atrial fibrillation; I50.33-Acute on chronic  diastolic (congestive) heart failure     TECHNIQUE: 30 seconds of fluoroscopic time was used for this exam. 6  associated fluoroscopic loops were saved. The patient was evaluated in  the seated lateral position while taking a variety of consistencies of  barium by mouth under the direction of speech pathology.     COMPARISON: NONE     FINDINGS: 30 seconds of fluoroscopy provided for a modified barium  swallow. Please see speech therapy report for full details and  recommendations.          Impression:      Fluoroscopy provided for a modified barium swallow. Please  see speech therapy report for full details and recommendations.         This report was finalized on 8/3/2022 3:41 PM by Seth Del Rosario MD.       CT Chest Without Contrast Diagnostic [[id number]] Collected: 08/02/22 1133     Updated: 08/02/22 1142    Narrative:         DATE OF EXAM:  8/2/2022 11:14 AM     PROCEDURE:  CT CHEST WO CONTRAST DIAGNOSTIC-     INDICATIONS:   Sepsis; A41.9-Sepsis, unspecified organism; R65.20-Severe sepsis without  septic shock; J96.01-Acute respiratory failure with hypoxia;  I48.91-Unspecified atrial fibrillation; I50.33-Acute on chronic  diastolic (congestive) heart failure     COMPARISON:   CT chest 06/08/2022     TECHNIQUE:  Routine transaxial slices were obtained  through the chest without the  administration of intravenous contrast. Reconstructed coronal and  sagittal images were also obtained. Automated exposure control and  iterative construction methods were used.      FINDINGS:  There are bibasilar effusions and atelectasis in the lower lungs. There  are also atelectatic changes in the upper lobes. There is coronary  artery calcification. There is a prominent precarinal lymph node which  has been noted measuring 2.2 x 1.4 cm previously measuring 2.15 x 1.3  cm. This might be reactive in nature. There are smaller AP window lymph  nodes.     There is a compression fracture of L1 which has been noted.       Impression:      1.  There are bibasilar effusions as well as atelectasis within both  lungs.  2.  Nonspecific precarinal lymph node as well as smaller lymph nodes  that may be reactive in nature. This has been suggested.  3.  Atherosclerotic change including coronary artery calcification.  4.  Unchanged L1 compression fracture.     This report was finalized on 8/2/2022 11:39 AM by Dionicio Kenney MD.       XR Chest 1 View [[id number]] Collected: 08/02/22 0703     Updated: 08/02/22 0705    Narrative:      EXAMINATION: XR CHEST 1 VW    DATE: 8/2/2022 6:02 AM    INDICATION:  Dyspnea;    COMPARISON:  June 7, 2022    FINDINGS:  Defibrillator pads overlying left chest limiting evaluation.    No focal consolidation, pleural effusion, or pneumothorax.    Cardiomediastinal silhouette  without definite interval change.    No acute osseous abnormality.          Impression:        1.  No acute cardiopulmonary process.          Electronically signed by:  Gabrielle Weir M.D.    8/2/2022 5:04 AM Mountain Time          I personally reviewed the radiographic studies       Impression:      -- Septic shock with hypotension, lactic acidosis, LEAH - improved  -- Grp B strep sepsis  Suspect that the right leg is the source  Endocarditis is a potential complication  -- Gram negative uti  --  Diarrhea- likely multifactorial ie lactose intolerance and possibly zosyn  GI panel normal  -- Atrial fibrillation on xarelto  -- Acute on chronic respiratory failure Bilateral pleural effusions and BNP 9,106  -- Pulmonary HTN  -- DM T2- uncontrolled A1c 9  -- Mitral regurgitation  -- HFpEF  -- Acute on chronic kidney disease  -- recent Nausea and Vomiting- evidently multiple residents had these symptoms  No evidence of aspiration pneumonia by CT     PLAN/RECOMMENDATIONS:   Thank you for asking us to see Rosanna Browne, I recommend the following:     -- Change to cefepime while cultures pending to see if diarrhea improves  -- Lactate tablets at bedside  I have asked RN to remind her to take them at meal times  --  I will order a lactose restricted diet  -- Agree with echo   -- Venous doppler - negative for DVT  -- probiotic while on antibiotics     Prognosis guarded/poor         Maria De Jesus Esparza MD  8/4/2022

## 2022-08-05 ENCOUNTER — APPOINTMENT (OUTPATIENT)
Dept: GENERAL RADIOLOGY | Facility: HOSPITAL | Age: 87
End: 2022-08-05

## 2022-08-05 PROBLEM — D69.6 THROMBOCYTOPENIA: Status: RESOLVED | Noted: 2022-08-02 | Resolved: 2022-08-05

## 2022-08-05 PROBLEM — N18.30 CKD (CHRONIC KIDNEY DISEASE) STAGE 3, GFR 30-59 ML/MIN (HCC): Status: ACTIVE | Noted: 2022-08-02

## 2022-08-05 PROBLEM — J96.01 SEPSIS WITH ACUTE HYPOXIC RESPIRATORY FAILURE WITHOUT SEPTIC SHOCK, DUE TO UNSPECIFIED ORGANISM: Status: RESOLVED | Noted: 2022-08-02 | Resolved: 2022-08-05

## 2022-08-05 PROBLEM — I48.21 PERMANENT ATRIAL FIBRILLATION: Status: ACTIVE | Noted: 2022-08-02

## 2022-08-05 PROBLEM — E87.20 LACTIC ACIDOSIS: Status: RESOLVED | Noted: 2022-08-02 | Resolved: 2022-08-05

## 2022-08-05 PROBLEM — E78.5 HLD (HYPERLIPIDEMIA): Status: RESOLVED | Noted: 2022-08-02 | Resolved: 2022-08-05

## 2022-08-05 PROBLEM — I27.20 PULMONARY HYPERTENSION (HCC): Status: ACTIVE | Noted: 2022-08-02

## 2022-08-05 PROBLEM — I27.20 PULMONARY HYPERTENSION: Status: RESOLVED | Noted: 2022-08-02 | Resolved: 2022-08-05

## 2022-08-05 PROBLEM — I25.119 CORONARY ARTERY DISEASE INVOLVING NATIVE CORONARY ARTERY OF NATIVE HEART WITH ANGINA PECTORIS: Status: ACTIVE | Noted: 2022-08-05

## 2022-08-05 PROBLEM — A40.1 SEPSIS DUE TO GROUP B STREPTOCOCCUS: Status: ACTIVE | Noted: 2022-08-03

## 2022-08-05 PROBLEM — R65.20 SEPSIS WITH ACUTE HYPOXIC RESPIRATORY FAILURE WITHOUT SEPTIC SHOCK, DUE TO UNSPECIFIED ORGANISM: Status: RESOLVED | Noted: 2022-08-02 | Resolved: 2022-08-05

## 2022-08-05 PROBLEM — A41.9 SEPSIS WITH ACUTE HYPOXIC RESPIRATORY FAILURE WITHOUT SEPTIC SHOCK, DUE TO UNSPECIFIED ORGANISM: Status: RESOLVED | Noted: 2022-08-02 | Resolved: 2022-08-05

## 2022-08-05 PROBLEM — I34.0 MITRAL VALVE REGURGITATION: Status: RESOLVED | Noted: 2022-08-02 | Resolved: 2022-08-05

## 2022-08-05 PROBLEM — I10 HTN (HYPERTENSION): Status: RESOLVED | Noted: 2022-08-02 | Resolved: 2022-08-05

## 2022-08-05 PROBLEM — R79.89 ELEVATED SERUM CREATININE: Status: RESOLVED | Noted: 2022-08-02 | Resolved: 2022-08-05

## 2022-08-05 LAB
ALBUMIN SERPL-MCNC: 3.1 G/DL (ref 3.5–5.2)
ALBUMIN/GLOB SERPL: 0.8 G/DL
ALP SERPL-CCNC: 79 U/L (ref 39–117)
ALT SERPL W P-5'-P-CCNC: 13 U/L (ref 1–33)
ANION GAP SERPL CALCULATED.3IONS-SCNC: 9 MMOL/L (ref 5–15)
ARTERIAL PATENCY WRIST A: ABNORMAL
AST SERPL-CCNC: 22 U/L (ref 1–32)
ATMOSPHERIC PRESS: ABNORMAL MM[HG]
BACTERIA SPEC AEROBE CULT: ABNORMAL
BASE EXCESS BLDA CALC-SCNC: 0.2 MMOL/L (ref 0–2)
BASOPHILS # BLD AUTO: 0.04 10*3/MM3 (ref 0–0.2)
BASOPHILS NFR BLD AUTO: 0.4 % (ref 0–1.5)
BDY SITE: ABNORMAL
BILIRUB SERPL-MCNC: 0.5 MG/DL (ref 0–1.2)
BODY TEMPERATURE: 37 C
BUN SERPL-MCNC: 51 MG/DL (ref 8–23)
BUN/CREAT SERPL: 34.2 (ref 7–25)
CALCIUM SPEC-SCNC: 7.7 MG/DL (ref 8.2–9.6)
CHLORIDE SERPL-SCNC: 98 MMOL/L (ref 98–107)
CO2 BLDA-SCNC: 30 MMOL/L (ref 22–33)
CO2 SERPL-SCNC: 26 MMOL/L (ref 22–29)
COHGB MFR BLD: 1 % (ref 0–2)
CREAT SERPL-MCNC: 1.49 MG/DL (ref 0.57–1)
D DIMER PPP FEU-MCNC: 5.41 MCGFEU/ML (ref 0.01–0.5)
D-LACTATE SERPL-SCNC: 1.1 MMOL/L (ref 0.5–2)
DEPRECATED RDW RBC AUTO: 48.7 FL (ref 37–54)
EGFRCR SERPLBLD CKD-EPI 2021: 32.8 ML/MIN/1.73
EOSINOPHIL # BLD AUTO: 0.16 10*3/MM3 (ref 0–0.4)
EOSINOPHIL NFR BLD AUTO: 1.6 % (ref 0.3–6.2)
EPAP: 0
ERYTHROCYTE [DISTWIDTH] IN BLOOD BY AUTOMATED COUNT: 15.2 % (ref 12.3–15.4)
GLOBULIN UR ELPH-MCNC: 3.9 GM/DL
GLUCOSE BLDC GLUCOMTR-MCNC: 62 MG/DL (ref 70–130)
GLUCOSE BLDC GLUCOMTR-MCNC: 62 MG/DL (ref 70–130)
GLUCOSE BLDC GLUCOMTR-MCNC: 87 MG/DL (ref 70–130)
GLUCOSE BLDC GLUCOMTR-MCNC: 91 MG/DL (ref 70–130)
GLUCOSE BLDC GLUCOMTR-MCNC: 92 MG/DL (ref 70–130)
GLUCOSE SERPL-MCNC: 112 MG/DL (ref 65–99)
HCO3 BLDA-SCNC: 28.1 MMOL/L (ref 20–26)
HCT VFR BLD AUTO: 41.1 % (ref 34–46.6)
HCT VFR BLD CALC: 38.2 % (ref 38–51)
HGB BLD-MCNC: 12.5 G/DL (ref 12–15.9)
HGB BLDA-MCNC: 12.5 G/DL (ref 14–18)
IMM GRANULOCYTES # BLD AUTO: 0.04 10*3/MM3 (ref 0–0.05)
IMM GRANULOCYTES NFR BLD AUTO: 0.4 % (ref 0–0.5)
INHALED O2 CONCENTRATION: 40 %
IPAP: 0
LYMPHOCYTES # BLD AUTO: 1 10*3/MM3 (ref 0.7–3.1)
LYMPHOCYTES NFR BLD AUTO: 10.2 % (ref 19.6–45.3)
MAGNESIUM SERPL-MCNC: 2.2 MG/DL (ref 1.7–2.3)
MCH RBC QN AUTO: 26.6 PG (ref 26.6–33)
MCHC RBC AUTO-ENTMCNC: 30.4 G/DL (ref 31.5–35.7)
MCV RBC AUTO: 87.4 FL (ref 79–97)
METHGB BLD QL: 0.4 % (ref 0–1.5)
MODALITY: ABNORMAL
MONOCYTES # BLD AUTO: 0.58 10*3/MM3 (ref 0.1–0.9)
MONOCYTES NFR BLD AUTO: 5.9 % (ref 5–12)
NEUTROPHILS NFR BLD AUTO: 8.02 10*3/MM3 (ref 1.7–7)
NEUTROPHILS NFR BLD AUTO: 81.5 % (ref 42.7–76)
NOTE: ABNORMAL
NRBC BLD AUTO-RTO: 0 /100 WBC (ref 0–0.2)
NT-PROBNP SERPL-MCNC: ABNORMAL PG/ML (ref 0–1800)
OXYHGB MFR BLDV: 95.8 % (ref 94–99)
PAW @ PEAK INSP FLOW SETTING VENT: 0 CMH2O
PCO2 BLDA: 60.3 MM HG (ref 35–45)
PCO2 TEMP ADJ BLD: 60.3 MM HG (ref 35–45)
PH BLDA: 7.28 PH UNITS (ref 7.35–7.45)
PH, TEMP CORRECTED: 7.28 PH UNITS
PLATELET # BLD AUTO: 149 10*3/MM3 (ref 140–450)
PMV BLD AUTO: 10.2 FL (ref 6–12)
PO2 BLDA: 94 MM HG (ref 83–108)
PO2 TEMP ADJ BLD: 94 MM HG (ref 83–108)
POTASSIUM SERPL-SCNC: 3.8 MMOL/L (ref 3.5–5.2)
PROCALCITONIN SERPL-MCNC: 13.49 NG/ML (ref 0–0.25)
PROT SERPL-MCNC: 7 G/DL (ref 6–8.5)
QT INTERVAL: 374 MS
QTC INTERVAL: 462 MS
RBC # BLD AUTO: 4.7 10*6/MM3 (ref 3.77–5.28)
SODIUM SERPL-SCNC: 133 MMOL/L (ref 136–145)
TOTAL RATE: 0 BREATHS/MINUTE
TROPONIN T SERPL-MCNC: 0.04 NG/ML (ref 0–0.03)
WBC NRBC COR # BLD: 9.84 10*3/MM3 (ref 3.4–10.8)

## 2022-08-05 PROCEDURE — 83050 HGB METHEMOGLOBIN QUAN: CPT

## 2022-08-05 PROCEDURE — 83735 ASSAY OF MAGNESIUM: CPT | Performed by: NURSE PRACTITIONER

## 2022-08-05 PROCEDURE — 80053 COMPREHEN METABOLIC PANEL: CPT | Performed by: NURSE PRACTITIONER

## 2022-08-05 PROCEDURE — 82805 BLOOD GASES W/O2 SATURATION: CPT

## 2022-08-05 PROCEDURE — 25010000002 CEFEPIME PER 500 MG: Performed by: INTERNAL MEDICINE

## 2022-08-05 PROCEDURE — 84484 ASSAY OF TROPONIN QUANT: CPT | Performed by: NURSE PRACTITIONER

## 2022-08-05 PROCEDURE — 94799 UNLISTED PULMONARY SVC/PX: CPT

## 2022-08-05 PROCEDURE — 71045 X-RAY EXAM CHEST 1 VIEW: CPT

## 2022-08-05 PROCEDURE — 99232 SBSQ HOSP IP/OBS MODERATE 35: CPT | Performed by: INTERNAL MEDICINE

## 2022-08-05 PROCEDURE — 82962 GLUCOSE BLOOD TEST: CPT

## 2022-08-05 PROCEDURE — 94664 DEMO&/EVAL PT USE INHALER: CPT

## 2022-08-05 PROCEDURE — 84145 PROCALCITONIN (PCT): CPT | Performed by: NURSE PRACTITIONER

## 2022-08-05 PROCEDURE — 36600 WITHDRAWAL OF ARTERIAL BLOOD: CPT

## 2022-08-05 PROCEDURE — 82375 ASSAY CARBOXYHB QUANT: CPT

## 2022-08-05 PROCEDURE — P9047 ALBUMIN (HUMAN), 25%, 50ML: HCPCS | Performed by: FAMILY MEDICINE

## 2022-08-05 PROCEDURE — 83880 ASSAY OF NATRIURETIC PEPTIDE: CPT | Performed by: INTERNAL MEDICINE

## 2022-08-05 PROCEDURE — 25010000002 CEFAZOLIN 1-4 GM/50ML-% SOLUTION: Performed by: INTERNAL MEDICINE

## 2022-08-05 PROCEDURE — 25010000002 ALBUMIN HUMAN 25% PER 50 ML: Performed by: FAMILY MEDICINE

## 2022-08-05 PROCEDURE — 85025 COMPLETE CBC W/AUTO DIFF WBC: CPT | Performed by: NURSE PRACTITIONER

## 2022-08-05 PROCEDURE — 94660 CPAP INITIATION&MGMT: CPT

## 2022-08-05 PROCEDURE — 83605 ASSAY OF LACTIC ACID: CPT | Performed by: NURSE PRACTITIONER

## 2022-08-05 PROCEDURE — 85379 FIBRIN DEGRADATION QUANT: CPT | Performed by: NURSE PRACTITIONER

## 2022-08-05 PROCEDURE — 99222 1ST HOSP IP/OBS MODERATE 55: CPT | Performed by: NURSE PRACTITIONER

## 2022-08-05 RX ORDER — L.ACID,PARA/B.BIFIDUM/S.THERM 8B CELL
1 CAPSULE ORAL DAILY
Status: DISCONTINUED | OUTPATIENT
Start: 2022-08-05 | End: 2022-08-09 | Stop reason: HOSPADM

## 2022-08-05 RX ORDER — BUMETANIDE 0.25 MG/ML
2 INJECTION INTRAMUSCULAR; INTRAVENOUS ONCE
Status: COMPLETED | OUTPATIENT
Start: 2022-08-06 | End: 2022-08-06

## 2022-08-05 RX ORDER — ALBUMIN (HUMAN) 12.5 G/50ML
25 SOLUTION INTRAVENOUS ONCE
Status: COMPLETED | OUTPATIENT
Start: 2022-08-05 | End: 2022-08-05

## 2022-08-05 RX ORDER — CEFAZOLIN SODIUM 1 G/50ML
1 INJECTION, SOLUTION INTRAVENOUS EVERY 12 HOURS
Status: DISCONTINUED | OUTPATIENT
Start: 2022-08-05 | End: 2022-08-07

## 2022-08-05 RX ORDER — ALBUMIN (HUMAN) 12.5 G/50ML
25 SOLUTION INTRAVENOUS ONCE
Status: COMPLETED | OUTPATIENT
Start: 2022-08-06 | End: 2022-08-06

## 2022-08-05 RX ORDER — BUMETANIDE 0.25 MG/ML
1 INJECTION INTRAMUSCULAR; INTRAVENOUS ONCE
Status: COMPLETED | OUTPATIENT
Start: 2022-08-05 | End: 2022-08-05

## 2022-08-05 RX ADMIN — CEFAZOLIN SODIUM 1 G: 1 INJECTION, SOLUTION INTRAVENOUS at 16:24

## 2022-08-05 RX ADMIN — Medication 10 ML: at 09:01

## 2022-08-05 RX ADMIN — NYSTATIN 1 APPLICATION: 100000 POWDER TOPICAL at 20:56

## 2022-08-05 RX ADMIN — CASTOR OIL AND BALSAM, PERU 1 APPLICATION: 788; 87 OINTMENT TOPICAL at 09:01

## 2022-08-05 RX ADMIN — Medication 10 MG: at 20:56

## 2022-08-05 RX ADMIN — LEVOTHYROXINE SODIUM 88 MCG: 88 TABLET ORAL at 05:06

## 2022-08-05 RX ADMIN — Medication 10 ML: at 20:56

## 2022-08-05 RX ADMIN — ALBUMIN HUMAN 25 G: 0.25 SOLUTION INTRAVENOUS at 02:44

## 2022-08-05 RX ADMIN — ACETAMINOPHEN 325MG 650 MG: 325 TABLET ORAL at 10:06

## 2022-08-05 RX ADMIN — METOPROLOL TARTRATE 12.5 MG: 25 TABLET, FILM COATED ORAL at 20:56

## 2022-08-05 RX ADMIN — RIVAROXABAN 15 MG: 15 TABLET, FILM COATED ORAL at 18:03

## 2022-08-05 RX ADMIN — TRAZODONE HYDROCHLORIDE 50 MG: 50 TABLET ORAL at 20:56

## 2022-08-05 RX ADMIN — CETIRIZINE HYDROCHLORIDE TABLETS 10 MG: 10 TABLET, FILM COATED ORAL at 09:01

## 2022-08-05 RX ADMIN — NYSTATIN 1 APPLICATION: 100000 POWDER TOPICAL at 09:01

## 2022-08-05 RX ADMIN — MONTELUKAST 10 MG: 10 TABLET, FILM COATED ORAL at 20:56

## 2022-08-05 RX ADMIN — CASTOR OIL AND BALSAM, PERU 1 APPLICATION: 788; 87 OINTMENT TOPICAL at 20:57

## 2022-08-05 RX ADMIN — IPRATROPIUM BROMIDE AND ALBUTEROL SULFATE 3 ML: .5; 3 SOLUTION RESPIRATORY (INHALATION) at 21:23

## 2022-08-05 RX ADMIN — BUMETANIDE 1 MG: 0.25 INJECTION INTRAMUSCULAR; INTRAVENOUS at 05:07

## 2022-08-05 RX ADMIN — Medication 1 CAPSULE: at 11:19

## 2022-08-05 RX ADMIN — METOPROLOL TARTRATE 12.5 MG: 25 TABLET, FILM COATED ORAL at 09:01

## 2022-08-05 RX ADMIN — ACETAMINOPHEN 325MG 650 MG: 325 TABLET ORAL at 20:56

## 2022-08-05 RX ADMIN — IPRATROPIUM BROMIDE AND ALBUTEROL SULFATE 3 ML: .5; 3 SOLUTION RESPIRATORY (INHALATION) at 12:03

## 2022-08-05 NOTE — PROGRESS NOTES
[facility] Progress Note    Admission Date: 8/2/2022    Rosanna Browne  5/5/1930  [id number]    Date: 8/5/2022    Antibiotics:  Anti-Infectives (From admission, onward)    Ordered     Dose/Rate Route Frequency Start Stop    08/05/22 1447  ceFAZolin (ANCEF) IVPB 1 g        Ordering Provider: Maria De Jesus Esparza MD    1 g Intravenous Every 12 Hours 08/05/22 1545 08/14/22 1544    08/04/22 1458  cefepime (MAXIPIME) 2 g/100 mL 0.9% NS (mbp)        Ordering Provider: Maria De Jesus Esparza MD    2 g  200 mL/hr over 30 Minutes Intravenous Once 08/04/22 1545 08/04/22 1657    08/02/22 0606  vancomycin 1750 mg/500 mL 0.9% NS IVPB ([facility])        Ordering Provider: Luis Fernando Serrano MD    20 mg/kg × 83.9 kg  over 105 Minutes Intravenous Once 08/02/22 0608 08/02/22 0914    08/02/22 0606  piperacillin-tazobactam (ZOSYN) 3.375 g in iso-osmotic dextrose 50 ml (premix)        Ordering Provider: Luis Fernando Serrano MD    3.375 g  over 30 Minutes Intravenous Once 08/02/22 0608 08/02/22 0723          Reason for Consultation: Grp B strep sepsis     History of present illness:    Patient is a 89 y.o. female with extensive comorbidity including uncontrolled DM, MR, Cor pulmonale, Afib, HFpED and CKDII transferred from [facility] 8/2 with a 5 day history of progressive dyspnea and multiple prior episodes of vomiting. Her daughter is not present at the bedside and some details of her history are inconsistent with history provided yesterday When asked about her medical history she talks about hospitalizations in the 90's  WBC 15.9, plt 137, BNP 9,106, L.A. 2.9-> 3.4-> 1.9  Procalcitonin 4.8 CRP 13  Creat 1.37-> 1.58   Chest CT with bibasilar atelectasis and pleural effusions     8/4/22 Afebrile, tired but easily awakened, states that she feels stronger and that diarrhea continues. O2 sats high 80's while I was in the room  Her daughter Chelsea is at bedside and expresses concern that she is not receiving a lactose restricted  diet. R leg erythema and pain somewhat improved  GNR from urine Venous doppler limited by patient tolerance but negative  8/5/22 Afebrile, increased hypoxemia last pm which improved after a dose of bumetanide;  overall more alert, no stools today so far on lactose restricted diet R leg erythema improving  GI panel negative  Proteus from urine sensitive to cefazolin    PE:  Vital Signs  Temp  Min: 95.8 °F (35.4 °C)  Max: 97.6 °F (36.4 °C)  BP  Min: 99/66  Max: 129/70  Pulse  Min: 87  Max: 114  Resp  Min: 18  Max: 24  SpO2  Min: 89 %  Max: 100 %    GENERAL: Awake and alert, in mild distress and tachypneic on 4L O2. Cooperative but very hard of hearing  HEENT: Normocephalic, atraumatic.  PERRL. EOMI. No conjunctival injection. No icterus. Oropharynx  without thrush or exudate.     NECK: Supple without nuchal rigidity. No mass.     HEART: irregular rhythm; heart sounds distant  LUNGS: Clear to auscultation bilaterally without wheezing, rales, rhonchi. Normal respiratory effort. Nonlabored. No dullness.  ABDOMEN: Soft, nontender, nondistended. Positive bowel sounds. No rebound or guarding.   EXT: right leg erythematous distal to the knee witherythema and induration extending up her medial right thigh, changes of stasis dermatitis distal legs and impetigo-like wounds right shin   :  Without Urbina catheter.  MSK:  No deformity  SKIN: right leg with erythema, and healed excoriations right shin  NEURO: nonfocal  PSYCHIATRIC: Normal insight and judgement. Cooperative with PE       Laboratory Data    Results from last 7 days   Lab Units 08/05/22  0237 08/04/22  0544 08/03/22  0517   WBC 10*3/mm3 9.84 11.94* 15.54*   HEMOGLOBIN g/dL 12.5 12.0 11.7*   HEMATOCRIT % 41.1 39.1 37.2   PLATELETS 10*3/mm3 149 125* 119*     Results from last 7 days   Lab Units 08/05/22  0237   SODIUM mmol/L 133*   POTASSIUM mmol/L 3.8   CHLORIDE mmol/L 98   CO2 mmol/L 26.0   BUN mg/dL 51*   CREATININE mg/dL 1.49*   GLUCOSE mg/dL 112*   CALCIUM mg/dL  7.7*     Results from last 7 days   Lab Units 08/05/22  0237   ALK PHOS U/L 79   BILIRUBIN mg/dL 0.5   ALT (SGPT) U/L 13   AST (SGOT) U/L 22         Results from last 7 days   Lab Units 08/02/22  0602   CRP mg/dL 13.77*       Estimated Creatinine Clearance: 27.5 mL/min (A) (by C-G formula based on SCr of 1.49 mg/dL (H)).      Microbiology:  Grp B strep from blood  GNR from urine    Radiology:  Imaging Results (Last 72 Hours)     Procedure Component Value Units Date/Time    XR Chest 1 View [[id number]] Collected: 08/05/22 0253     Updated: 08/05/22 0256    Narrative:      Examination: AP view of the chest    Indication: Shortness of breath    Comparison: 8/4/2022    Findings:  The cardiomediastinal silhouette is enlarged.    Patchy opacities are present in the perihilar right lung. Streaky opacities are present in the perihilar lungs and left base. Trace pleural effusions are present.    No acute osseous abnormality is identified.      Impression:      Impression:    1. Patchy opacities in the perihilar right lung, suspicious for pneumonia.  2. Regions of atelectasis in both lungs.  3. Cardiomegaly.  4. Trace pleural effusions.    Electronically signed by:  Kenny iSfuentes M.D.    8/5/2022 12:55 AM Mountain Time    XR Chest 1 View [[id number]] Collected: 08/04/22 1534     Updated: 08/04/22 1538    Narrative:         DATE OF EXAM:   8/4/2022 3:07 PM     PROCEDURE:   XR CHEST 1 VW-     INDICATIONS:   chf; A41.9-Sepsis, unspecified organism; R65.20-Severe sepsis without  septic shock; J96.01-Acute respiratory failure with hypoxia;  I48.91-Unspecified atrial fibrillation; I50.33-Acute on chronic  diastolic (congestive) heart failure     COMPARISON:  08/02/2022     TECHNIQUE:   Portable chest radiograph.     FINDINGS:   Heart is mildly enlarged. There is central pulmonary vascular  congestion. Persistent patchy bibasilar airspace disease with small  bilateral pleural effusions. No pneumothorax. Aortic  arch  atherosclerosis.       Impression:      Cardiomegaly and central pulmonary vascular congestion.     Persistent bibasilar airspace disease with small bilateral pleural  effusions.     This report was finalized on 8/4/2022 3:35 PM by Jonathan Whatley MD.       FL Video Swallow With Speech Single Contrast [[id number]] Collected: 08/03/22 1513     Updated: 08/03/22 1545    Narrative:      EXAMINATION: FL VIDEO SWALLOW W SPEECH SINGLE-CONTRAST-     INDICATION: dysphagia; A41.9-Sepsis, unspecified organism; R65.20-Severe  sepsis without septic shock; J96.01-Acute respiratory failure with  hypoxia; I48.91-Unspecified atrial fibrillation; I50.33-Acute on chronic  diastolic (congestive) heart failure     TECHNIQUE: 30 seconds of fluoroscopic time was used for this exam. 6  associated fluoroscopic loops were saved. The patient was evaluated in  the seated lateral position while taking a variety of consistencies of  barium by mouth under the direction of speech pathology.     COMPARISON: NONE     FINDINGS: 30 seconds of fluoroscopy provided for a modified barium  swallow. Please see speech therapy report for full details and  recommendations.          Impression:      Fluoroscopy provided for a modified barium swallow. Please  see speech therapy report for full details and recommendations.         This report was finalized on 8/3/2022 3:41 PM by Seth Del Rosario MD.             I personally reviewed the radiographic studies       Impression:      -- Septic shock with hypotension, lactic acidosis, LEAH - improved  -- Grp B strep sepsis  Suspect that the right leg is the source  Endocarditis is a potential complication  -- Proteus uti  -- Diarrhea- likely multifactorial ie lactose intolerance and possibly zosyn  Improved  -- Atrial fibrillation on xarelto  -- Acute on chronic respiratory failure Bilateral pleural effusions and BNP 9,106  -- Pulmonary HTN  -- DM T2- uncontrolled A1c 9  -- Mitral regurgitation  -- HFpEF  -- Acute  on chronic kidney disease  -- recent Nausea and Vomiting- evidently multiple residents had these symptoms  No evidence of aspiration pneumonia by CT     PLAN/RECOMMENDATIONS:   Thank you for asking us to see Rosanna Browne, I recommend the following:     -- Cefazolin to 8/8 then change to cefuroxime 250 mg bid to 8/15  -- Lactate tablets at bedside  I have asked RN to remind her to take them at meal times  --  lactose restricted diet  -- Agree with echo   -- Venous doppler - negative for DVT  -- probiotic while on antibiotics  Discussed with RN     Prognosis guarded/poor         Maria De Jesus Esparza MD  8/5/2022

## 2022-08-05 NOTE — SIGNIFICANT NOTE
08/05/22  Pt with worsening hypoxia. Required Bipap, FiO2 40.   Worsening CHF on CXR. proBNP has increased to 16,610. Decreased metoprolol dose and gave albumin to increase BP and will diuresis with bumex 1 mg IV x1   Elevated D-dimer, but had CTA, negative for PE on 6/8/22. BP not able to tolerate diltiazem gtt for rate control. Will consult cardiology for input in the am due to declining resp status.   Electronically signed by Kelsey Lamas DO, 08/05/22, 4:48 AM EDT.

## 2022-08-05 NOTE — PROGRESS NOTES
[facility] Medicine Services  PROGRESS NOTE    Patient Name: Rosanna Browne  : 1930  MRN: [id number]    Date of Admission: 2022  Primary Care Physician: Red Perez MD    Subjective   Subjective     CC:  Sepsis    HPI:  Patient reports that she is doing better this morning.  Still remains on 4 L of oxygen but no respiratory distress.  She is thirsty this morning.  Otherwise no complaints.    ROS:  General: denies fevers or chills  CV: denies chest pain  Resp: denies shortness of breath  Abd: denies abd pain, nausea      Objective   Objective     Vital Signs:   Temp:  [95.8 °F (35.4 °C)-97.6 °F (36.4 °C)] 97.6 °F (36.4 °C)  Heart Rate:  [] 90  Resp:  [18-24] 24  BP: ()/(61-80) 99/66  Flow (L/min):  [4-5] 4     Physical Exam:  Constitutional: Sleeping but easily awoken, hard of hearing  Respiratory: Decreased breath sounds bilaterally, respiratory effort normal on liters  Cardiovascular: RRR, no murmurs, rubs, or gallops  Gastrointestinal: Positive bowel sounds, soft, nontender, nondistended  Musculoskeletal: No bilateral ankle edema  Psychiatric: Appropriate affect, cooperative  Neurologic: Oriented x 3, no focal neurological deficits      Results Reviewed:  LAB RESULTS:      Lab 22  0237 22  0544 22  0517 22  0016 22  1637 22  1250 22  0938 22  0602   WBC 9.84 11.94* 15.54*  --   --   --   --  15.98*   HEMOGLOBIN 12.5 12.0 11.7*  --   --   --   --  13.5   HEMATOCRIT 41.1 39.1 37.2  --   --   --   --  43.3   PLATELETS 149 125* 119*  --   --   --   --  137*   NEUTROS ABS 8.02* 10.16*  --   --   --   --   --  14.26*   IMMATURE GRANS (ABS) 0.04 0.05  --   --   --   --   --  0.16*   LYMPHS ABS 1.00 0.97  --   --   --   --   --  1.04   MONOS ABS 0.58 0.62  --   --   --   --   --  0.46   EOS ABS 0.16 0.10  --   --   --   --   --  0.02   MCV 87.4 86.5 85.9  --   --   --   --  85.7   CRP  --   --   --   --   --   --    --  13.77*   PROCALCITONIN 13.49*  --   --   --   --   --   --  4.82*   LACTATE 1.1  --   --  1.9 2.3* 3.4* 3.1* 2.9*   D DIMER QUANT 5.41* 4.62*  --   --   --   --   --   --          Lab 08/05/22  0237 08/04/22  0544 08/03/22  0517 08/02/22  0602   SODIUM 133* 131* 137 133*   POTASSIUM 3.8 3.5 3.8 4.2   CHLORIDE 98 97* 98 92*   CO2 26.0 23.0 25.0 27.0   ANION GAP 9.0 11.0 14.0 14.0   BUN 51* 48* 47* 43*   CREATININE 1.49* 1.40* 1.58* 1.37*   EGFR 32.8* 35.4* 30.6* 36.3*   GLUCOSE 112* 122* 110* 250*   CALCIUM 7.7* 7.4* 7.2* 8.6   MAGNESIUM 2.2 2.2 1.6* 1.5*   PHOSPHORUS  --   --   --  2.9   HEMOGLOBIN A1C  --   --  9.00*  --    TSH  --   --   --  5.410*         Lab 08/05/22 0237 08/04/22  0544 08/02/22  0602   TOTAL PROTEIN 7.0 7.2 8.1   ALBUMIN 3.10* 2.90* 3.70   GLOBULIN 3.9 4.3 4.4   ALT (SGPT) 13 12 13   AST (SGOT) 22 25 30   BILIRUBIN 0.5 0.6 1.4*   ALK PHOS 79 136* 133*   LIPASE  --   --  17         Lab 08/05/22 0237 08/02/22  0602   PROBNP 16,610.0* 9,106.0*   TROPONIN T 0.045* 0.054*         Lab 08/03/22  0517   CHOLESTEROL 82   LDL CHOL 42   HDL CHOL 23*   TRIGLYCERIDES 82             Lab 08/05/22  0209 08/02/22  0704   PH, ARTERIAL 7.277* 7.420   PCO2, ARTERIAL 60.3* 43.8   PO2 ART 94.0 63.8*   FIO2 40 44   HCO3 ART 28.1* 28.4*   BASE EXCESS ART 0.2 3.3*   CARBOXYHEMOGLOBIN 1.0 1.4     Brief Urine Lab Results  (Last result in the past 365 days)      Color   Clarity   Blood   Leuk Est   Nitrite   Protein   CREAT   Urine HCG        08/02/22 0629 Yellow   Cloudy   Moderate (2+)   Moderate (2+)   Negative   >=300 mg/dL (3+)                 Microbiology Results Abnormal     Procedure Component Value - Date/Time    Gastrointestinal Panel, PCR - Stool, Per Rectum [[id number]]  (Normal) Collected: 08/04/22 1127    Lab Status: Final result Specimen: Stool from Per Rectum Updated: 08/04/22 1315     Campylobacter Not Detected     Plesiomonas shigelloides Not Detected     Salmonella Not Detected     Vibrio Not  Detected     Vibrio cholerae Not Detected     Yersinia enterocolitica Not Detected     Enteroaggregative E. coli (EAEC) Not Detected     Enteropathogenic E. coli (EPEC) Not Detected     Enterotoxigenic E. coli (ETEC) lt/st Not Detected     Shiga-like toxin-producing E. coli (STEC) stx1/stx2 Not Detected     Shigella/Enteroinvasive E. coli (EIEC) Not Detected     Cryptosporidium Not Detected     Cyclospora cayetanensis Not Detected     Entamoeba histolytica Not Detected     Giardia lamblia Not Detected     Adenovirus F40/41 Not Detected     Astrovirus Not Detected     Norovirus GI/GII Not Detected     Rotavirus A Not Detected     Sapovirus (I, II, IV or V) Not Detected    COVID PRE-OP / PRE-PROCEDURE SCREENING ORDER (NO ISOLATION) - Swab, Nasopharynx [[id number]]  (Normal) Collected: 08/02/22 0607    Lab Status: Final result Specimen: Swab from Nasopharynx Updated: 08/02/22 0721    Narrative:      The following orders were created for panel order COVID PRE-OP / PRE-PROCEDURE SCREENING ORDER (NO ISOLATION) - Swab, Nasopharynx.  Procedure                               Abnormality         Status                     ---------                               -----------         ------                     Respiratory Panel PCR w/...[[id number]]  Normal              Final result                 Please view results for these tests on the individual orders.    Respiratory Panel PCR w/COVID-19(SARS-CoV-2) BRENTON/ELLA/HAILEE/PAD/COR/MAD/DEANN In-House, NP Swab in UTM/VTM, 3-4 HR TAT - Swab, Nasopharynx [[id number]]  (Normal) Collected: 08/02/22 0607    Lab Status: Final result Specimen: Swab from Nasopharynx Updated: 08/02/22 0721     ADENOVIRUS, PCR Not Detected     Coronavirus 229E Not Detected     Coronavirus HKU1 Not Detected     Coronavirus NL63 Not Detected     Coronavirus OC43 Not Detected     COVID19 Not Detected     Human Metapneumovirus Not Detected     Human Rhinovirus/Enterovirus Not Detected     Influenza A PCR Not Detected      Influenza B PCR Not Detected     Parainfluenza Virus 1 Not Detected     Parainfluenza Virus 2 Not Detected     Parainfluenza Virus 3 Not Detected     Parainfluenza Virus 4 Not Detected     RSV, PCR Not Detected     Bordetella pertussis pcr Not Detected     Bordetella parapertussis PCR Not Detected     Chlamydophila pneumoniae PCR Not Detected     Mycoplasma pneumo by PCR Not Detected    Narrative:      In the setting of a positive respiratory panel with a viral infection PLUS a negative procalcitonin without other underlying concern for bacterial infection, consider observing off antibiotics or discontinuation of antibiotics and continue supportive care. If the respiratory panel is positive for atypical bacterial infection (Bordetella pertussis, Chlamydophila pneumoniae, or Mycoplasma pneumoniae), consider antibiotic de-escalation to target atypical bacterial infection.          Adult Transthoracic Echo Complete W/ Cont if Necessary Per Protocol    Result Date: 8/3/2022  · The right atrial cavity is moderate to severely dilated. · There is a small left pleural effusion. There is a small right pleural effusion. · Moderate to severe tricuspid valve regurgitation is present. · Estimated right ventricular systolic pressure from tricuspid regurgitation is markedly elevated (>55 mmHg). Calculated right ventricular systolic pressure from tricuspid regurgitation is 68 mmHg. · Estimated left ventricular EF = 55% Left ventricular ejection fraction appears to be 51 - 55%. Left ventricular systolic function is normal. · Moderate to severe pulmonary hypertension is present. · Systolic and diastolic flattening of the interventricular septum consistent with right ventricle pressure and volume overload. · The right ventricular cavity is moderately dilated with normal right ventricular wall thickness and severely reduced right ventricular systolic function; abnormal septal motion. · There is no evidence of pericardial effusion.       FL Video Swallow With Speech Single Contrast    Result Date: 8/3/2022  EXAMINATION: FL VIDEO SWALLOW W SPEECH SINGLE-CONTRAST-  INDICATION: dysphagia; A41.9-Sepsis, unspecified organism; R65.20-Severe sepsis without septic shock; J96.01-Acute respiratory failure with hypoxia; I48.91-Unspecified atrial fibrillation; I50.33-Acute on chronic diastolic (congestive) heart failure  TECHNIQUE: 30 seconds of fluoroscopic time was used for this exam. 6 associated fluoroscopic loops were saved. The patient was evaluated in the seated lateral position while taking a variety of consistencies of barium by mouth under the direction of speech pathology.  COMPARISON: NONE  FINDINGS: 30 seconds of fluoroscopy provided for a modified barium swallow. Please see speech therapy report for full details and recommendations.       Impression: Fluoroscopy provided for a modified barium swallow. Please see speech therapy report for full details and recommendations.    This report was finalized on 8/3/2022 3:41 PM by Seth Del Rosario MD.      XR Chest 1 View    Result Date: 8/5/2022  Examination: AP view of the chest Indication: Shortness of breath Comparison: 8/4/2022 Findings: The cardiomediastinal silhouette is enlarged. Patchy opacities are present in the perihilar right lung. Streaky opacities are present in the perihilar lungs and left base. Trace pleural effusions are present. No acute osseous abnormality is identified.     Impression: Impression: 1. Patchy opacities in the perihilar right lung, suspicious for pneumonia. 2. Regions of atelectasis in both lungs. 3. Cardiomegaly. 4. Trace pleural effusions. Electronically signed by:  Kenny Sifuentes M.D.  8/5/2022 12:55 AM Mountain Time    XR Chest 1 View    Result Date: 8/4/2022   DATE OF EXAM: 8/4/2022 3:07 PM  PROCEDURE: XR CHEST 1 VW-  INDICATIONS: chf; A41.9-Sepsis, unspecified organism; R65.20-Severe sepsis without septic shock; J96.01-Acute respiratory failure with hypoxia;  I48.91-Unspecified atrial fibrillation; I50.33-Acute on chronic diastolic (congestive) heart failure  COMPARISON: 08/02/2022  TECHNIQUE: Portable chest radiograph.  FINDINGS: Heart is mildly enlarged. There is central pulmonary vascular congestion. Persistent patchy bibasilar airspace disease with small bilateral pleural effusions. No pneumothorax. Aortic arch atherosclerosis.      Impression: Cardiomegaly and central pulmonary vascular congestion.  Persistent bibasilar airspace disease with small bilateral pleural effusions.  This report was finalized on 8/4/2022 3:35 PM by Jonathan Whatley MD.      Duplex Venous Lower Extremity - Bilateral CAR    Result Date: 8/4/2022  · Normal bilateral lower extremity venous duplex scan.        Results for orders placed during the hospital encounter of 08/02/22    Adult Transthoracic Echo Complete W/ Cont if Necessary Per Protocol    Interpretation Summary  · The right atrial cavity is moderate to severely dilated.  · There is a small left pleural effusion. There is a small right pleural effusion.  · Moderate to severe tricuspid valve regurgitation is present.  · Estimated right ventricular systolic pressure from tricuspid regurgitation is markedly elevated (>55 mmHg). Calculated right ventricular systolic pressure from tricuspid regurgitation is 68 mmHg.  · Estimated left ventricular EF = 55% Left ventricular ejection fraction appears to be 51 - 55%. Left ventricular systolic function is normal.  · Moderate to severe pulmonary hypertension is present.  · Systolic and diastolic flattening of the interventricular septum consistent with right ventricle pressure and volume overload.  · The right ventricular cavity is moderately dilated with normal right ventricular wall thickness and severely reduced right ventricular systolic function; abnormal septal motion.  · There is no evidence of pericardial effusion.      I have reviewed the medications:  Scheduled Meds:castor oil-balsam peru,  1 application, Topical, Q12H  cefepime, 1 g, Intravenous, Q24H  cetirizine, 10 mg, Oral, Daily  insulin detemir, 10 Units, Subcutaneous, Nightly  insulin lispro, 0-7 Units, Subcutaneous, TID AC  levothyroxine, 88 mcg, Oral, Q AM  melatonin, 10 mg, Oral, Nightly  metoprolol tartrate, 12.5 mg, Oral, BID  montelukast, 10 mg, Oral, Nightly  nystatin, 1 application, Topical, BID  rivaroxaban, 15 mg, Oral, Daily With Dinner  sodium chloride, 10 mL, Intravenous, Q12H  traZODone, 50 mg, Oral, Nightly      Continuous Infusions:dilTIAZem, 5-15 mg/hr, Last Rate: Stopped (08/02/22 2145)      PRN Meds:.•  acetaminophen  •  dextrose  •  dextrose  •  glucagon (human recombinant)  •  HYDROcodone-acetaminophen  •  ipratropium-albuterol  •  ondansetron **OR** ondansetron  •  sodium chloride  •  sodium chloride    Assessment & Plan   Assessment & Plan     Active Hospital Problems    Diagnosis  POA   • Bacteremia due to group B Streptococcus [R78.81, B95.1]  Unknown   • Sepsis with acute hypoxic respiratory failure without septic shock, due to unspecified organism ([facility]) [A41.9, R65.20, J96.01]  Yes   • A-fib ([facility]) [I48.91]  Unknown   • Elevated serum creatinine [R79.89]  Unknown   • Thrombocytopenia ([facility]) [D69.6]  Unknown   • Lactic acidosis [E87.2]  Unknown   • Hypomagnesemia [E83.42]  Unknown   • HTN (hypertension) [I10]  Unknown   • HLD (hyperlipidemia) [E78.5]  Unknown   • Hypothyroidism [E03.9]  Unknown   • CKD (chronic kidney disease), stage II [N18.2]  Unknown   • (HFpEF) heart failure with preserved ejection fraction (HCC) [I50.30]  Unknown   • Pulmonary hypertension (HCC) [I27.20]  Unknown   • Mitral valve regurgitation [I34.0]  Unknown   • T2DM (type 2 diabetes mellitus) ([facility]) [E11.9]  Unknown      Resolved Hospital Problems   No resolved problems to display.        Rosanna Browne is a 89 y.o. female with PMH of HTN, HLD, T2DM on insulin, Hypothyroidism, Severe Pulmonary HTN, MVR, HFpEF, Afib on Xarelto and CKD II  presented from [facility] with severe sepsis. Pt also noted to be hypoxic and in Afib with RVR.     Severe Sepsis  Group B Strep Bacteremia  RLE cellulitis  -febrile with leukocytosis, elevated lactate, elevated procal on presentation.   -suspect RLE cellulitis vs urinary source given UA with moderate LE, 3-5 WBCs and trace bacteria   -CXr unremarkable. CT chest without contrast uncreamkable  -TTE with no evidence of endocarditis.   -RVP (including COVID19) negative  -GI PCR negative  -blood Cx with GBS, appears it is in all four bottles sent from admission   -Urine culture growing GNR  -ID consulted. Continue cefepime.     Hypoxia  H/o Severe Pulmonary HTN  H/o HFpEF  -- pt with noted hypoxia on RA at NH.  Wears supplemental O2 continuously at baseline but unsure of how many liters. Currently stable on 4L of oxygen  -- s/p IV Lasix in the ED, defer further diuresis for now  -- CT chest unremarkable  -- TTE with moderate to severe Pulm HTN, right ventricular dysfunction, normal LVEF.   -- suspect due aspiration from recent episodes of emesis vs flash pulm edema due to Afib with RVR. CXR does not show pulmonary edema    Lactic acidosis  - noted on presentation, resolved.      Afib with RVR  -- [id number] of 5, on Xarelto at home- dose adjusted based on renal function  -- continue Metoprolol succinate as BP tolerates  -- continue dilt gtt as needed     T2DM  -- hold home meds  -- HbA1C 9% c/w poor control at home  -- consult DM educator  -- SSI with low dose basal insulin for now     HTN  -- hold antihypertensives for now as low normal blood pressure  -- Continue metoprolol    HLD  -- LDL at goal      Elevated creatinine on CKD II  -- unsure of baseline Cr, Cr currently stable at 1.4.  Suspect her baseline may be 1.4-1.6 based off of measurements here  -- monitor/renally dose meds     Hypomagnesemia  -- replace per protocol     TCP, resolved  -- mild, now resolved. Likely due to  sepsis     Hypothyroidism  -- TSH elevated  --  increased home dose Synthroid from 75 mcg to 88mcg.  Needs repeat TSH in 4-6 weeks        Expected Discharge Location and Transportation: TBD, likely back to NH  Expected Discharge Date: 8/15/22       DVT prophylaxis:  Medical DVT prophylaxis orders are present.     AM-PAC 6 Clicks Score (PT): 11 (08/04/22 2000)    CODE STATUS:   Code Status and Medical Interventions:   Ordered at: 08/02/22 1206     Medical Intervention Limits:    NO intubation (DNI)    NO cardioversion    NO dialysis    NO vasopressors    NO artificial nutrition     Level Of Support Discussed With:    Patient     Code Status (Patient has no pulse and is not breathing):    No CPR (Do Not Attempt to Resuscitate)     Medical Interventions (Patient has pulse or is breathing):    Limited Support       This patient's problems and plans were partially entered by my partner and updated as appropriate by me 08/05/22. Today is my first day evaluating this patient's active medical problems. I Personally reviewed chart and adjusted note to reflect daily changes in management/clinical condition      Vilma Chapman MD  08/05/22

## 2022-08-05 NOTE — CONSULTS
"[facility] Consult/H&P Note      Referring Provider: Saeed Bush MD  Primary Provider:  Red Perez MD  Reason for Consultation: Afib    PROBLEM LIST:  1. Atrial fibrillation  a. CHADSVASC = 5  HAS-BLED = 3  b. Echo 8/3/22: moderate to severely dilated RA, moderate to severe TR, RVSP 68 mmHg, EF 51-55%, RV moderately dilated, reduced right systolic function  2. Moderate to severe TR  3. Severe pulmonary hypertension  4. Hypertension  5. Hyperlipidemia  6. Type II diabetes, insulin dependent  7. Hypothyroidism  8. Chronic kidney disease      HISTORY OF PRESENT ILLNESS:  Rosanna Browne is a 89 y.o. female with the above noted pmhx who presented with severe sepsis and acute hypoxic respiratory failure. Workup has included: CXR trace pleural effusions, EKG Afib with RVR, Troponin 0.054-->0.045, positive D-Dimer, ProBNP 37063, blood cultures with GBS and urin Cx growing GNR. She was admitted by medicine team and given a bolus dose of IV lasix. She became hypotensive in response to this and required gentle fluid resuscitation. She has continued to require 2-4L NC. She was given IV albumin and 1 mg of IV bumex early this AM. Regarding Afib, she has been mostly rate controlled with po metoprolol and IV Cardizem. Creatinine remains stable.     Cardiology has been consulted for Afib, diastolic heart failure, TR, pulmonary hypertension. She reports that she follows with Dr. Reyes for a \"bad valve\" She saw him last in the Spring. She is not a good historian. She is not sure if she has a h/o Afib. She reports being short of breath today but cannot remember if this is normal for her.       REVIEW OF SYSTEMS  Pertinent positives are listed in the HPI and all other ROS are negative.      SOCIAL HISTORY:   reports that she has quit smoking. Her smoking use included cigarettes. She has never used smokeless tobacco. She reports that she does not drink alcohol and does not use drugs.     FAMILY " HISTORY:  family history is not on file.     CURRENT MEDICATIONS:      Current Facility-Administered Medications:   •  acetaminophen (TYLENOL) tablet 650 mg, 650 mg, Oral, Q6H PRN, Maria C Andersen MD, 650 mg at 08/05/22 1006  •  castor oil-balsam peru (VENELEX) ointment 1 application, 1 application, Topical, Q12H, Maria C Andersen MD, 1 application at 08/05/22 0901  •  cefepime (MAXIPIME) 2 g/100 mL 0.9% NS (mbp), 2 g, Intravenous, Q24H, Maria De Jesus Esparza MD  •  cetirizine (zyrTEC) tablet 10 mg, 10 mg, Oral, Daily, Maria C Andersen MD, 10 mg at 08/05/22 0901  •  dextrose (D50W) (25 g/50 mL) IV injection 25 g, 25 g, Intravenous, Q15 Min PRN, Maria C Andersen MD  •  dextrose (GLUTOSE) oral gel 15 g, 15 g, Oral, Q15 Min PRN, Maria C Andersen MD  •  dilTIAZem (CARDIZEM) 125 mg in 125 mL 0.7% sodium chloride  infusion, 5-15 mg/hr, Intravenous, Titrated, Saeed Bush MD, Stopped at 08/02/22 2145  •  glucagon (human recombinant) (GLUCAGEN DIAGNOSTIC) injection 1 mg, 1 mg, Intramuscular, Q15 Min PRN, Maria C Andersen MD  •  HYDROcodone-acetaminophen (NORCO) 5-325 MG per tablet 1 tablet, 1 tablet, Oral, Q4H PRN, Maria C Andersen MD  •  insulin detemir (LEVEMIR) injection 10 Units, 10 Units, Subcutaneous, Nightly, Maria C Andersen MD, 10 Units at 08/04/22 2125  •  Insulin Lispro (humaLOG) injection 0-7 Units, 0-7 Units, Subcutaneous, TID AC, Maria C Andersen MD, 2 Units at 08/04/22 1808  •  ipratropium-albuterol (DUO-NEB) nebulizer solution 3 mL, 3 mL, Nebulization, Q4H PRN, Maria C Andersen MD, 3 mL at 08/04/22 7107  •  lactobacillus acidophilus (RISAQUAD) capsule 1 capsule, 1 capsule, Oral, Daily, Vilma Chapman MD  •  levothyroxine (SYNTHROID, LEVOTHROID) tablet 88 mcg, 88 mcg, Oral, Q AM, Maria C Andersen MD, 88 mcg at 08/05/22 0506  •  melatonin tablet 10 mg, 10 mg, Oral, Nightly, Maria C Andersen MD, 10 mg at 08/04/22 2124  •  metoprolol  "tartrate (LOPRESSOR) half tablet 12.5 mg, 12.5 mg, Oral, BID, Kelsey Lamas DO, 12.5 mg at 08/05/22 0901  •  montelukast (SINGULAIR) tablet 10 mg, 10 mg, Oral, Nightly, Maria C Andersen MD, 10 mg at 08/04/22 2124  •  nystatin (MYCOSTATIN) powder 1 application, 1 application, Topical, BID, Maria C Andersen MD, 1 application at 08/05/22 0901  •  ondansetron (ZOFRAN) tablet 4 mg, 4 mg, Oral, Q6H PRN **OR** ondansetron (ZOFRAN) injection 4 mg, 4 mg, Intravenous, Q6H PRN, Maria C Andersen MD  •  rivaroxaban (XARELTO) tablet 15 mg, 15 mg, Oral, Daily With Dinner, Maria C Andersen MD, 15 mg at 08/04/22 1807  •  sodium chloride 0.9 % flush 1-10 mL, 1-10 mL, Intravenous, PRN, Maria C Andersen MD  •  sodium chloride 0.9 % flush 10 mL, 10 mL, Intravenous, PRN, Saeed Bush MD  •  sodium chloride 0.9 % flush 10 mL, 10 mL, Intravenous, Q12H, Maria C Andersen MD, 10 mL at 08/05/22 0901  •  traZODone (DESYREL) tablet 50 mg, 50 mg, Oral, Nightly, Maria C Andersen MD, 50 mg at 08/04/22 2124     Allergies:  Lisinopril    Objective     Vital Sign Min/Max for last 24 hours  Temp  Min: 95.8 °F (35.4 °C)  Max: 97.6 °F (36.4 °C)   BP  Min: 99/66  Max: 129/70   Pulse  Min: 87  Max: 114   Resp  Min: 18  Max: 24   SpO2  Min: 89 %  Max: 100 %   No data recorded   Weight  Min: 83.9 kg (185 lb)  Max: 91.9 kg (202 lb 8 oz)     Flowsheet Rows    Flowsheet Row First Filed Value   Admission Height 167.6 cm (66\") Documented at 08/02/2022 0551   Admission Weight 83.9 kg (185 lb) Documented at 08/02/2022 0551          PHYSICAL EXAM:  GENERAL: This is a well-developed, well-nourished, female who is in no acute distress.   SKIN: Pink and warm  HEENT: Head is normocephalic and atraumatic.   NECK: There is no jugular venous distention at 30°.  LUNGS: Clear to auscultation bilaterally with expiratory wheezing, no rhonchi, or rales noted.   CARDIOVASCULAR: Irregularly irregular rhythm, tachy rate  ABDOMEN: Soft " and nondistended with positive bowel sounds x4.   PERIPHERAL VASCULAR:  Posterior tibial and dorsalis pedis pulses are 1+ and symmetrical. There is trace peripheral edema.      EKG:  Initial EKG demonstrated Afib with RVR  Tele: Afib rate controlled    LABS:      Lab Results   Component Value Date    WBC 9.84 08/05/2022    HGB 12.5 08/05/2022    HCT 41.1 08/05/2022    MCV 87.4 08/05/2022     08/05/2022     Lab Results   Component Value Date    GLUCOSE 112 (H) 08/05/2022    BUN 51 (H) 08/05/2022    CREATININE 1.49 (H) 08/05/2022    EGFRIFNONA 43 (L) 07/11/2020    EGFRIFAFRI 52 (L) 07/11/2020    BCR 34.2 (H) 08/05/2022    K 3.8 08/05/2022    CO2 26.0 08/05/2022    CALCIUM 7.7 (L) 08/05/2022    ALBUMIN 3.10 (L) 08/05/2022    AST 22 08/05/2022    ALT 13 08/05/2022     Lab Results   Component Value Date    TROPONINT 0.045 (C) 08/05/2022     No results found for: INR, PROTIME  Lab Results   Component Value Date    CHOL 82 08/03/2022    TRIG 82 08/03/2022    HDL 23 (L) 08/03/2022    LDL 42 08/03/2022        Results Review: I reviewed the patient's new clinical results.      ASSESSMENT:    1.  Sepsis  UTI  a. Per medicine  2. Acute respiratory failure  Right sided heart failure  a. She has chronic hypoxia for which she reports being on continuous home O2 at unknown liters  b. Echo: normal LVEF, reduced right systolic heart failure  3. Atrial fibrillation  a. CHADSVASC = 5  HAS-BLED = 3  b. Echo 8/3/22: moderate to severely dilated RA, moderate to severe TR, RVSP 68 mmHg, EF 51-55%, RV moderately dilated, reduced right systolic function  4. Moderate to severe TR  5. Severe pulmonary hypertension  6. History of mitral valve repair- data deficit  7. Hypertension  8. Hyperlipidemia  9. Type II diabetes, insulin dependent  10. Hypothyroidism  11. Chronic kidney disease    PLAN:    1. Gentle diuresis due to CKD, borderline low BP  2. Continue renally dosed Xarelto  3. Continue metoprolol 12.5 mg bid for rate control.    4. Consider adding renally dosed Digoxin for rate control as needed  5. Obtain records from Dr. Tong   6. Abx per medicine.       Electronically signed by MONICA Morales, 08/05/22, 12:15 PM EDT.

## 2022-08-05 NOTE — CASE MANAGEMENT/SOCIAL WORK
Continued Stay Note  [facility]     Patient Name: Rosanna Browne  MRN: [id number]  Today's Date: 8/5/2022    Admit Date: 8/2/2022     Discharge Plan     Row Name 08/05/22 1107       Plan    Plan [facility]    Patient/Family in Agreement with Plan yes    Plan Comments Patient's plan is to return to [facility] in a skilled bed when medically ready for discharge.  [facility] ambulance TENTATIVELY scheduled for Monday, 8/8 at 1230.  Updated [facility] at [place].   will continue to follow.               Discharge Codes    No documentation.               Expected Discharge Date and Time     Expected Discharge Date Expected Discharge Time    Aug 8, 2022             Pricilla Drew RN

## 2022-08-06 LAB
GLUCOSE BLDC GLUCOMTR-MCNC: 123 MG/DL (ref 70–130)
GLUCOSE BLDC GLUCOMTR-MCNC: 134 MG/DL (ref 70–130)
GLUCOSE BLDC GLUCOMTR-MCNC: 153 MG/DL (ref 70–130)
GLUCOSE BLDC GLUCOMTR-MCNC: 169 MG/DL (ref 70–130)
GLUCOSE BLDC GLUCOMTR-MCNC: 172 MG/DL (ref 70–130)
QT INTERVAL: 270 MS
QTC INTERVAL: 469 MS

## 2022-08-06 PROCEDURE — 94799 UNLISTED PULMONARY SVC/PX: CPT

## 2022-08-06 PROCEDURE — 63710000001 INSULIN LISPRO (HUMAN) PER 5 UNITS: Performed by: INTERNAL MEDICINE

## 2022-08-06 PROCEDURE — 25010000002 ALBUMIN HUMAN 25% PER 50 ML: Performed by: INTERNAL MEDICINE

## 2022-08-06 PROCEDURE — 25010000002 CEFAZOLIN 1-4 GM/50ML-% SOLUTION: Performed by: INTERNAL MEDICINE

## 2022-08-06 PROCEDURE — 82962 GLUCOSE BLOOD TEST: CPT

## 2022-08-06 PROCEDURE — P9047 ALBUMIN (HUMAN), 25%, 50ML: HCPCS | Performed by: INTERNAL MEDICINE

## 2022-08-06 PROCEDURE — 63710000001 DIPHENHYDRAMINE PER 50 MG: Performed by: NURSE PRACTITIONER

## 2022-08-06 PROCEDURE — 99232 SBSQ HOSP IP/OBS MODERATE 35: CPT | Performed by: INTERNAL MEDICINE

## 2022-08-06 PROCEDURE — 63710000001 INSULIN DETEMIR PER 5 UNITS: Performed by: INTERNAL MEDICINE

## 2022-08-06 RX ORDER — CHOLECALCIFEROL (VITAMIN D3) 125 MCG
5 CAPSULE ORAL NIGHTLY PRN
Status: DISCONTINUED | OUTPATIENT
Start: 2022-08-06 | End: 2022-08-06

## 2022-08-06 RX ORDER — DIPHENHYDRAMINE HCL 25 MG
25 CAPSULE ORAL ONCE
Status: COMPLETED | OUTPATIENT
Start: 2022-08-06 | End: 2022-08-06

## 2022-08-06 RX ADMIN — IPRATROPIUM BROMIDE AND ALBUTEROL SULFATE 3 ML: .5; 3 SOLUTION RESPIRATORY (INHALATION) at 13:14

## 2022-08-06 RX ADMIN — METOPROLOL TARTRATE 12.5 MG: 25 TABLET, FILM COATED ORAL at 22:01

## 2022-08-06 RX ADMIN — CETIRIZINE HYDROCHLORIDE TABLETS 10 MG: 10 TABLET, FILM COATED ORAL at 09:07

## 2022-08-06 RX ADMIN — IPRATROPIUM BROMIDE AND ALBUTEROL SULFATE 3 ML: .5; 3 SOLUTION RESPIRATORY (INHALATION) at 02:59

## 2022-08-06 RX ADMIN — Medication 10 ML: at 22:01

## 2022-08-06 RX ADMIN — LEVOTHYROXINE SODIUM 88 MCG: 88 TABLET ORAL at 05:25

## 2022-08-06 RX ADMIN — Medication 10 ML: at 09:08

## 2022-08-06 RX ADMIN — DIPHENHYDRAMINE HYDROCHLORIDE 25 MG: 25 CAPSULE ORAL at 01:05

## 2022-08-06 RX ADMIN — IPRATROPIUM BROMIDE AND ALBUTEROL SULFATE 3 ML: .5; 3 SOLUTION RESPIRATORY (INHALATION) at 23:54

## 2022-08-06 RX ADMIN — INSULIN DETEMIR 10 UNITS: 100 INJECTION, SOLUTION SUBCUTANEOUS at 22:02

## 2022-08-06 RX ADMIN — NYSTATIN 1 APPLICATION: 100000 POWDER TOPICAL at 09:06

## 2022-08-06 RX ADMIN — BUMETANIDE 2 MG: 0.25 INJECTION INTRAMUSCULAR; INTRAVENOUS at 05:26

## 2022-08-06 RX ADMIN — Medication 1 CAPSULE: at 09:07

## 2022-08-06 RX ADMIN — INSULIN LISPRO 2 UNITS: 100 INJECTION, SOLUTION INTRAVENOUS; SUBCUTANEOUS at 18:39

## 2022-08-06 RX ADMIN — NYSTATIN 1 APPLICATION: 100000 POWDER TOPICAL at 22:01

## 2022-08-06 RX ADMIN — ALBUMIN HUMAN 25 G: 0.25 SOLUTION INTRAVENOUS at 05:25

## 2022-08-06 RX ADMIN — MONTELUKAST 10 MG: 10 TABLET, FILM COATED ORAL at 22:01

## 2022-08-06 RX ADMIN — CASTOR OIL AND BALSAM, PERU 1 APPLICATION: 788; 87 OINTMENT TOPICAL at 22:01

## 2022-08-06 RX ADMIN — TRAZODONE HYDROCHLORIDE 50 MG: 50 TABLET ORAL at 22:01

## 2022-08-06 RX ADMIN — CEFAZOLIN SODIUM 1 G: 1 INJECTION, SOLUTION INTRAVENOUS at 15:05

## 2022-08-06 RX ADMIN — ACETAMINOPHEN 325MG 650 MG: 325 TABLET ORAL at 02:59

## 2022-08-06 RX ADMIN — CASTOR OIL AND BALSAM, PERU 1 APPLICATION: 788; 87 OINTMENT TOPICAL at 09:07

## 2022-08-06 RX ADMIN — ACETAMINOPHEN 325MG 650 MG: 325 TABLET ORAL at 23:14

## 2022-08-06 RX ADMIN — RIVAROXABAN 15 MG: 15 TABLET, FILM COATED ORAL at 18:40

## 2022-08-06 RX ADMIN — CEFAZOLIN SODIUM 1 G: 1 INJECTION, SOLUTION INTRAVENOUS at 02:59

## 2022-08-06 RX ADMIN — METOPROLOL TARTRATE 12.5 MG: 25 TABLET, FILM COATED ORAL at 09:07

## 2022-08-06 RX ADMIN — INSULIN LISPRO 2 UNITS: 100 INJECTION, SOLUTION INTRAVENOUS; SUBCUTANEOUS at 12:56

## 2022-08-06 RX ADMIN — Medication 10 MG: at 22:01

## 2022-08-06 NOTE — PROGRESS NOTES
[facility] Medicine Services  PROGRESS NOTE    Patient Name: Rosanna Browne  : 1930  MRN: [id number]    Date of Admission: 2022  Primary Care Physician: Red Perez MD    Subjective   Subjective     CC:  Bacteremia    HPI:  Patient is doing well this morning.  Remains on 3 to 4 L of oxygen.  Denies any shortness of breath.    ROS:  General: denies fevers or chills  CV: denies chest pain  Resp: denies shortness of breath  Abd: denies abd pain, nausea      Objective   Objective     Vital Signs:   Temp:  [97.4 °F (36.3 °C)-98.3 °F (36.8 °C)] 97.4 °F (36.3 °C)  Heart Rate:  [] 110  Resp:  [18-24] 20  BP: (105-134)/(63-90) 129/88  Flow (L/min):  [2-3] 3     Physical Exam:  Constitutional: No acute distress, awake, alert, hard of hearing, eating breakfast  Respiratory: Clear to auscultation bilaterally, respiratory effort normal on 4L  Cardiovascular: RRR, no murmurs, rubs, or gallops  Gastrointestinal: Positive bowel sounds, soft, nontender, nondistended  Musculoskeletal: No bilateral ankle edema  Psychiatric: Appropriate affect, cooperative  Neurologic: Oriented x 3, no focal deficits  Skin: chronic venous stasis changes bilaterally      Results Reviewed:  LAB RESULTS:      Lab 22  0237 22  0544 22  0517 22  0016 22  1637 22  1250 22  0938 22  0602   WBC 9.84 11.94* 15.54*  --   --   --   --  15.98*   HEMOGLOBIN 12.5 12.0 11.7*  --   --   --   --  13.5   HEMATOCRIT 41.1 39.1 37.2  --   --   --   --  43.3   PLATELETS 149 125* 119*  --   --   --   --  137*   NEUTROS ABS 8.02* 10.16*  --   --   --   --   --  14.26*   IMMATURE GRANS (ABS) 0.04 0.05  --   --   --   --   --  0.16*   LYMPHS ABS 1.00 0.97  --   --   --   --   --  1.04   MONOS ABS 0.58 0.62  --   --   --   --   --  0.46   EOS ABS 0.16 0.10  --   --   --   --   --  0.02   MCV 87.4 86.5 85.9  --   --   --   --  85.7   CRP  --   --   --   --   --   --   --  13.77*    PROCALCITONIN 13.49*  --   --   --   --   --   --  4.82*   LACTATE 1.1  --   --  1.9 2.3* 3.4* 3.1* 2.9*   D DIMER QUANT 5.41* 4.62*  --   --   --   --   --   --          Lab 08/05/22  0237 08/04/22  0544 08/03/22  0517 08/02/22  0602   SODIUM 133* 131* 137 133*   POTASSIUM 3.8 3.5 3.8 4.2   CHLORIDE 98 97* 98 92*   CO2 26.0 23.0 25.0 27.0   ANION GAP 9.0 11.0 14.0 14.0   BUN 51* 48* 47* 43*   CREATININE 1.49* 1.40* 1.58* 1.37*   EGFR 32.8* 35.4* 30.6* 36.3*   GLUCOSE 112* 122* 110* 250*   CALCIUM 7.7* 7.4* 7.2* 8.6   MAGNESIUM 2.2 2.2 1.6* 1.5*   PHOSPHORUS  --   --   --  2.9   HEMOGLOBIN A1C  --   --  9.00*  --    TSH  --   --   --  5.410*         Lab 08/05/22  0237 08/04/22  0544 08/02/22  0602   TOTAL PROTEIN 7.0 7.2 8.1   ALBUMIN 3.10* 2.90* 3.70   GLOBULIN 3.9 4.3 4.4   ALT (SGPT) 13 12 13   AST (SGOT) 22 25 30   BILIRUBIN 0.5 0.6 1.4*   ALK PHOS 79 136* 133*   LIPASE  --   --  17         Lab 08/05/22  0237 08/02/22  0602   PROBNP 16,610.0* 9,106.0*   TROPONIN T 0.045* 0.054*         Lab 08/03/22  0517   CHOLESTEROL 82   LDL CHOL 42   HDL CHOL 23*   TRIGLYCERIDES 82             Lab 08/05/22  0209 08/02/22  0704   PH, ARTERIAL 7.277* 7.420   PCO2, ARTERIAL 60.3* 43.8   PO2 ART 94.0 63.8*   FIO2 40 44   HCO3 ART 28.1* 28.4*   BASE EXCESS ART 0.2 3.3*   CARBOXYHEMOGLOBIN 1.0 1.4     Brief Urine Lab Results  (Last result in the past 365 days)      Color   Clarity   Blood   Leuk Est   Nitrite   Protein   CREAT   Urine HCG        08/02/22 0629 Yellow   Cloudy   Moderate (2+)   Moderate (2+)   Negative   >=300 mg/dL (3+)                 Microbiology Results Abnormal     Procedure Component Value - Date/Time    Gastrointestinal Panel, PCR - Stool, Per Rectum [[id number]]  (Normal) Collected: 08/04/22 1127    Lab Status: Final result Specimen: Stool from Per Rectum Updated: 08/04/22 1315     Campylobacter Not Detected     Plesiomonas shigelloides Not Detected     Salmonella Not Detected     Vibrio Not Detected      Vibrio cholerae Not Detected     Yersinia enterocolitica Not Detected     Enteroaggregative E. coli (EAEC) Not Detected     Enteropathogenic E. coli (EPEC) Not Detected     Enterotoxigenic E. coli (ETEC) lt/st Not Detected     Shiga-like toxin-producing E. coli (STEC) stx1/stx2 Not Detected     Shigella/Enteroinvasive E. coli (EIEC) Not Detected     Cryptosporidium Not Detected     Cyclospora cayetanensis Not Detected     Entamoeba histolytica Not Detected     Giardia lamblia Not Detected     Adenovirus F40/41 Not Detected     Astrovirus Not Detected     Norovirus GI/GII Not Detected     Rotavirus A Not Detected     Sapovirus (I, II, IV or V) Not Detected    COVID PRE-OP / PRE-PROCEDURE SCREENING ORDER (NO ISOLATION) - Swab, Nasopharynx [[id number]]  (Normal) Collected: 08/02/22 0607    Lab Status: Final result Specimen: Swab from Nasopharynx Updated: 08/02/22 0721    Narrative:      The following orders were created for panel order COVID PRE-OP / PRE-PROCEDURE SCREENING ORDER (NO ISOLATION) - Swab, Nasopharynx.  Procedure                               Abnormality         Status                     ---------                               -----------         ------                     Respiratory Panel PCR w/...[[id number]]  Normal              Final result                 Please view results for these tests on the individual orders.    Respiratory Panel PCR w/COVID-19(SARS-CoV-2) BRENTON/ELLA/HAILEE/PAD/COR/MAD/DEANN In-House, NP Swab in UTM/VTM, 3-4 HR TAT - Swab, Nasopharynx [[id number]]  (Normal) Collected: 08/02/22 0607    Lab Status: Final result Specimen: Swab from Nasopharynx Updated: 08/02/22 0721     ADENOVIRUS, PCR Not Detected     Coronavirus 229E Not Detected     Coronavirus HKU1 Not Detected     Coronavirus NL63 Not Detected     Coronavirus OC43 Not Detected     COVID19 Not Detected     Human Metapneumovirus Not Detected     Human Rhinovirus/Enterovirus Not Detected     Influenza A PCR Not Detected     Influenza B  PCR Not Detected     Parainfluenza Virus 1 Not Detected     Parainfluenza Virus 2 Not Detected     Parainfluenza Virus 3 Not Detected     Parainfluenza Virus 4 Not Detected     RSV, PCR Not Detected     Bordetella pertussis pcr Not Detected     Bordetella parapertussis PCR Not Detected     Chlamydophila pneumoniae PCR Not Detected     Mycoplasma pneumo by PCR Not Detected    Narrative:      In the setting of a positive respiratory panel with a viral infection PLUS a negative procalcitonin without other underlying concern for bacterial infection, consider observing off antibiotics or discontinuation of antibiotics and continue supportive care. If the respiratory panel is positive for atypical bacterial infection (Bordetella pertussis, Chlamydophila pneumoniae, or Mycoplasma pneumoniae), consider antibiotic de-escalation to target atypical bacterial infection.          XR Chest 1 View    Result Date: 8/5/2022  Examination: AP view of the chest Indication: Shortness of breath Comparison: 8/4/2022 Findings: The cardiomediastinal silhouette is enlarged. Patchy opacities are present in the perihilar right lung. Streaky opacities are present in the perihilar lungs and left base. Trace pleural effusions are present. No acute osseous abnormality is identified.     Impression: Impression: 1. Patchy opacities in the perihilar right lung, suspicious for pneumonia. 2. Regions of atelectasis in both lungs. 3. Cardiomegaly. 4. Trace pleural effusions. Electronically signed by:  Kenny Sifuentes M.D.  8/5/2022 12:55 AM Mountain Time    XR Chest 1 View    Result Date: 8/4/2022   DATE OF EXAM: 8/4/2022 3:07 PM  PROCEDURE: XR CHEST 1 VW-  INDICATIONS: chf; A41.9-Sepsis, unspecified organism; R65.20-Severe sepsis without septic shock; J96.01-Acute respiratory failure with hypoxia; I48.91-Unspecified atrial fibrillation; I50.33-Acute on chronic diastolic (congestive) heart failure  COMPARISON: 08/02/2022  TECHNIQUE: Portable chest  radiograph.  FINDINGS: Heart is mildly enlarged. There is central pulmonary vascular congestion. Persistent patchy bibasilar airspace disease with small bilateral pleural effusions. No pneumothorax. Aortic arch atherosclerosis.      Impression: Cardiomegaly and central pulmonary vascular congestion.  Persistent bibasilar airspace disease with small bilateral pleural effusions.  This report was finalized on 8/4/2022 3:35 PM by Jonathan Whatley MD.      Duplex Venous Lower Extremity - Bilateral CAR    Result Date: 8/4/2022  · Normal bilateral lower extremity venous duplex scan.        Results for orders placed during the hospital encounter of 08/02/22    Adult Transthoracic Echo Complete W/ Cont if Necessary Per Protocol    Interpretation Summary  · The right atrial cavity is moderate to severely dilated.  · There is a small left pleural effusion. There is a small right pleural effusion.  · Moderate to severe tricuspid valve regurgitation is present.  · Estimated right ventricular systolic pressure from tricuspid regurgitation is markedly elevated (>55 mmHg). Calculated right ventricular systolic pressure from tricuspid regurgitation is 68 mmHg.  · Estimated left ventricular EF = 55% Left ventricular ejection fraction appears to be 51 - 55%. Left ventricular systolic function is normal.  · Moderate to severe pulmonary hypertension is present.  · Systolic and diastolic flattening of the interventricular septum consistent with right ventricle pressure and volume overload.  · The right ventricular cavity is moderately dilated with normal right ventricular wall thickness and severely reduced right ventricular systolic function; abnormal septal motion.  · There is no evidence of pericardial effusion.      I have reviewed the medications:  Scheduled Meds:castor oil-balsam peru, 1 application, Topical, Q12H  ceFAZolin, 1 g, Intravenous, Q12H  cetirizine, 10 mg, Oral, Daily  insulin detemir, 10 Units, Subcutaneous,  Nightly  insulin lispro, 0-7 Units, Subcutaneous, TID AC  lactobacillus acidophilus, 1 capsule, Oral, Daily  levothyroxine, 88 mcg, Oral, Q AM  melatonin, 10 mg, Oral, Nightly  metoprolol tartrate, 12.5 mg, Oral, BID  montelukast, 10 mg, Oral, Nightly  nystatin, 1 application, Topical, BID  rivaroxaban, 15 mg, Oral, Daily With Dinner  sodium chloride, 10 mL, Intravenous, Q12H  traZODone, 50 mg, Oral, Nightly      Continuous Infusions:dilTIAZem, 5-15 mg/hr, Last Rate: Stopped (08/02/22 2145)      PRN Meds:.•  acetaminophen  •  dextrose  •  dextrose  •  glucagon (human recombinant)  •  HYDROcodone-acetaminophen  •  ipratropium-albuterol  •  ondansetron **OR** ondansetron  •  sodium chloride  •  sodium chloride    Assessment & Plan   Assessment & Plan     Active Hospital Problems    Diagnosis  POA   • **Sepsis due to group B Streptococcus ([facility]) [A40.1]  Yes   • Coronary artery disease involving native coronary artery of native heart with angina pectoris ([facility]) [I25.119]  Yes   • Permanent atrial fibrillation ([facility]) [I48.21]  Yes   • Hypomagnesemia [E83.42]  Yes   • Hypothyroidism [E03.9]  Yes   • CKD (chronic kidney disease) stage 3, GFR 30-59 ml/min ([facility]) [N18.30]  Yes   • Pulmonary hypertension ([facility]) [I27.20]  Yes   • T2DM (type 2 diabetes mellitus) ([facility]) [E11.9]  Yes      Resolved Hospital Problems    Diagnosis Date Resolved POA   • Sepsis with acute hypoxic respiratory failure without septic shock, due to unspecified organism ([facility]) [A41.9, R65.20, J96.01] 08/05/2022 Yes   • Elevated serum creatinine [R79.89] 08/05/2022 Yes   • Thrombocytopenia ([facility]) [D69.6] 08/05/2022 Yes   • Lactic acidosis [E87.2] 08/05/2022 Yes   • HTN (hypertension) [I10] 08/05/2022 Yes   • HLD (hyperlipidemia) [E78.5] 08/05/2022 Unknown   • Pulmonary hypertension ([facility]) [I27.20] 08/05/2022 Yes   • Mitral valve regurgitation [I34.0] 08/05/2022 Yes        Rosanna Browne is a 89 y.o. female with PMH of HTN, HLD, T2DM on insulin, Hypothyroidism,  Severe Pulmonary HTN, MVR, HFpEF, Afib on Xarelto and CKD II presented from [facility] with severe sepsis. Pt also noted to be hypoxic and in Afib with RVR.     Severe Sepsis  Group B Strep Bacteremia  RLE cellulitis  -febrile with leukocytosis, elevated lactate, elevated procal on presentation.   -suspect RLE cellulitis vs urinary source given UA with moderate LE, 3-5 WBCs and trace bacteria   -CXr unremarkable. CT chest without contrast uncreamkable  -TTE with no evidence of endocarditis.   -RVP (including COVID19) negative  -GI PCR negative  -blood Cx with GBS, appears it is in all four bottles sent from admission   -Urine culture growing GNR  -ID consulted.  Patient to continue cefazolin until 8/8 then changed to cefuroxime to 50 mg twice daily until 8/15.     Hypoxia  H/o Severe Pulmonary HTN  H/o HFpEF  -- pt with noted hypoxia on RA at NH.  Wears supplemental O2 continuously at baseline but unsure of how many liters. Currently stable on 3-4L of oxygen  -- s/p IV Lasix in the ED  - albumin and bumex per cards today.   -- CT chest unremarkable  -- TTE with moderate to severe Pulm HTN, right ventricular dysfunction, normal LVEF.   -- suspect due aspiration from recent episodes of emesis vs flash pulm edema due to Afib with RVR. CXR does not show pulmonary edema     Lactic acidosis  - noted on presentation, resolved.      Afib with RVR  -- [id number] of 5, on Xarelto at home- dose adjusted based on renal function  -- continue Metoprolol succinate as BP tolerates     T2DM  -- hold home meds  -- HbA1C 9% c/w poor control at home  -- consult DM educator  -- SSI with low dose basal insulin for now  -- Glucoses in the 902-120s over past 24 hours. controlled     HTN  -- Normotensive  -- Continue metoprolol     HLD  -- LDL at goal      Elevated creatinine on CKD II  -- unsure of baseline Cr.  Suspect her baseline may be 1.4-1.6 based off of measurements here  -- monitor/renally dose meds  - BMP in  am     Hypomagnesemia  -- replace per protocol     TCP, resolved  -- mild, now resolved. Likely due to sepsis     Hypothyroidism  -- TSH elevated  --  increased home dose Synthroid from 75 mcg to 88mcg.  Needs repeat TSH in 4-6 weeks          Expected Discharge Location and Transportation: D, likely back to NH  Expected Discharge Date: 8/8/22-8/9    DVT prophylaxis:  Medical DVT prophylaxis orders are present.     AM-PAC 6 Clicks Score (PT): 10 (08/05/22 0800)    CODE STATUS:   Code Status and Medical Interventions:   Ordered at: 08/02/22 1206     Medical Intervention Limits:    NO intubation (DNI)    NO cardioversion    NO dialysis    NO vasopressors    NO artificial nutrition     Level Of Support Discussed With:    Patient     Code Status (Patient has no pulse and is not breathing):    No CPR (Do Not Attempt to Resuscitate)     Medical Interventions (Patient has pulse or is breathing):    Limited Support     I have prepared this progress note with copied portions of the prior day's progress note of my own authorship to preserve accuracy and maintain consistency of documentation. I have reviewed these portions and edited them for correctness. I verify that the above documentation accurately and truly represents the evaluation and management performed on today's date.       Vilma Chapman MD  08/06/22

## 2022-08-06 NOTE — PROGRESS NOTES
[facility] Progress Note    Admission Date: 8/2/2022    Rosanna Browne  5/5/1930  [id number]    Date: 8/6/2022    Antibiotics:  Anti-Infectives (From admission, onward)    Ordered     Dose/Rate Route Frequency Start Stop    08/05/22 1447  ceFAZolin (ANCEF) IVPB 1 g        Ordering Provider: Maria De Jesus Esparza MD    1 g Intravenous Every 12 Hours 08/05/22 1545 08/14/22 1544    08/04/22 1458  cefepime (MAXIPIME) 2 g/100 mL 0.9% NS (mbp)        Ordering Provider: Maria De Jesus Esparza MD    2 g  200 mL/hr over 30 Minutes Intravenous Once 08/04/22 1545 08/04/22 1657    08/02/22 0606  vancomycin 1750 mg/500 mL 0.9% NS IVPB ([facility])        Ordering Provider: Luis Fernando Serrano MD    20 mg/kg × 83.9 kg  over 105 Minutes Intravenous Once 08/02/22 0608 08/02/22 0914    08/02/22 0606  piperacillin-tazobactam (ZOSYN) 3.375 g in iso-osmotic dextrose 50 ml (premix)        Ordering Provider: Luis Fernando Serrano MD    3.375 g  over 30 Minutes Intravenous Once 08/02/22 0608 08/02/22 0723          Reason for Consultation: Grp B strep sepsis     History of present illness:    Patient is a 89 y.o. female with extensive comorbidity including uncontrolled DM, MR, Cor pulmonale, Afib, HFpED and CKDII transferred from [facility] 8/2 with a 5 day history of progressive dyspnea and multiple prior episodes of vomiting. Her daughter is not present at the bedside and some details of her history are inconsistent with history provided yesterday When asked about her medical history she talks about hospitalizations in the 90's  WBC 15.9, plt 137, BNP 9,106, L.A. 2.9-> 3.4-> 1.9  Procalcitonin 4.8 CRP 13  Creat 1.37-> 1.58   Chest CT with bibasilar atelectasis and pleural effusions     8/4/22 Afebrile, tired but easily awakened, states that she feels stronger and that diarrhea continues. O2 sats high 80's while I was in the room  Her daughter Chelsea is at bedside and expresses concern that she is not receiving a lactose restricted  diet. R leg erythema and pain somewhat improved  GNR from urine Venous doppler limited by patient tolerance but negative  8/5/22 Afebrile, increased hypoxemia last pm which improved after a dose of bumetanide;  overall more alert, no stools today so far on lactose restricted diet R leg erythema improving  GI panel negative  Proteus from urine sensitive to cefazolin  8/6/22 Afebrile, alert, denies R leg pain, her main c/o is that she does not like the food  O2 decreased to 3L NC    PE:  Vital Signs  Temp  Min: 97.4 °F (36.3 °C)  Max: 98.4 °F (36.9 °C)  BP  Min: 105/63  Max: 134/89  Pulse  Min: 95  Max: 120  Resp  Min: 18  Max: 24  SpO2  Min: 92 %  Max: 98 %    GENERAL: Awake and alert, in no distress . Cooperative but very hard of hearing  HEENT: Normocephalic, atraumatic.  PERRL. EOMI. No conjunctival injection. No icterus. Oropharynx  without thrush or exudate.     NECK: Supple without nuchal rigidity. No mass.     HEART: irregular rhythm; heart sounds distant  LUNGS: Clear to auscultation bilaterally without wheezing, rales, rhonchi. Normal respiratory effort. Nonlabored. No dullness.  ABDOMEN: Soft, nontender, nondistended. Positive bowel sounds. No rebound or guarding.   EXT: right leg erythema and warmth much improved, changes of stasis dermatitis distal legs and impetigo-like wounds right shin   :  Without Urbina catheter.  MSK:  No deformity  SKIN: right leg with erythema, and healed excoriations right shin  NEURO: nonfocal  PSYCHIATRIC: Normal insight and judgement. Cooperative with PE       Laboratory Data    Results from last 7 days   Lab Units 08/05/22  0237 08/04/22  0544 08/03/22  0517   WBC 10*3/mm3 9.84 11.94* 15.54*   HEMOGLOBIN g/dL 12.5 12.0 11.7*   HEMATOCRIT % 41.1 39.1 37.2   PLATELETS 10*3/mm3 149 125* 119*     Results from last 7 days   Lab Units 08/05/22  0237   SODIUM mmol/L 133*   POTASSIUM mmol/L 3.8   CHLORIDE mmol/L 98   CO2 mmol/L 26.0   BUN mg/dL 51*   CREATININE mg/dL 1.49*   GLUCOSE  mg/dL 112*   CALCIUM mg/dL 7.7*     Results from last 7 days   Lab Units 08/05/22  0237   ALK PHOS U/L 79   BILIRUBIN mg/dL 0.5   ALT (SGPT) U/L 13   AST (SGOT) U/L 22         Results from last 7 days   Lab Units 08/02/22  0602   CRP mg/dL 13.77*       Estimated Creatinine Clearance: 27.5 mL/min (A) (by C-G formula based on SCr of 1.49 mg/dL (H)).      Microbiology:  Grp B strep from blood  proteus from urine S to cefazolin, R to amp/sul, quinolones, nitrofurantoin and T/S    Radiology:  Imaging Results (Last 72 Hours)     Procedure Component Value Units Date/Time    XR Chest 1 View [[id number]] Collected: 08/05/22 0253     Updated: 08/05/22 0256    Narrative:      Examination: AP view of the chest    Indication: Shortness of breath    Comparison: 8/4/2022    Findings:  The cardiomediastinal silhouette is enlarged.    Patchy opacities are present in the perihilar right lung. Streaky opacities are present in the perihilar lungs and left base. Trace pleural effusions are present.    No acute osseous abnormality is identified.      Impression:      Impression:    1. Patchy opacities in the perihilar right lung, suspicious for pneumonia.  2. Regions of atelectasis in both lungs.  3. Cardiomegaly.  4. Trace pleural effusions.    Electronically signed by:  Kenny Sifuentes M.D.    8/5/2022 12:55 AM Mountain Time    XR Chest 1 View [[id number]] Collected: 08/04/22 1534     Updated: 08/04/22 1538    Narrative:         DATE OF EXAM:   8/4/2022 3:07 PM     PROCEDURE:   XR CHEST 1 VW-     INDICATIONS:   chf; A41.9-Sepsis, unspecified organism; R65.20-Severe sepsis without  septic shock; J96.01-Acute respiratory failure with hypoxia;  I48.91-Unspecified atrial fibrillation; I50.33-Acute on chronic  diastolic (congestive) heart failure     COMPARISON:  08/02/2022     TECHNIQUE:   Portable chest radiograph.     FINDINGS:   Heart is mildly enlarged. There is central pulmonary vascular  congestion. Persistent patchy bibasilar airspace  disease with small  bilateral pleural effusions. No pneumothorax. Aortic arch  atherosclerosis.       Impression:      Cardiomegaly and central pulmonary vascular congestion.     Persistent bibasilar airspace disease with small bilateral pleural  effusions.     This report was finalized on 8/4/2022 3:35 PM by Jonathan Whatley MD.             I personally reviewed the radiographic studies       Impression:      -- Septic shock with hypotension, lactic acidosis, LEAH - improved  -- Grp B strep sepsis  Suspect that the right leg is the source  TTE showed no vegetations and she promptly responded to antimicrobial therapy, making endocarditis less likely  -- Proteus uti- resistant organism to most oral antibiotics with the exception of 2nd and 3rd generation cephalosporins  -- Diarrhea- likely multifactorial ie lactose intolerance and possibly zosyn  Improved  -- Atrial fibrillation on xarelto  -- Acute on chronic respiratory failure Bilateral pleural effusions and BNP 9,106  -- Pulmonary HTN  -- DM T2- uncontrolled A1c 9  -- Mitral regurgitation  -- HFpEF  -- Acute on chronic kidney disease  -- recent Nausea and Vomiting- evidently multiple residents had these symptoms  No evidence of aspiration pneumonia by CT     PLAN/RECOMMENDATIONS:   Thank you for asking us to see Rosanna Browne, I recommend the following:     -- Cefazolin to 8/8 then change to cefuroxime 250 mg bid to 8/15  -- Lactate tablets at bedside  I have asked RN to remind her to take them at meal times  --  lactose restricted diet  -- Agree with echo   -- Venous doppler - negative for DVT  -- probiotic while on antibiotics      I will see again 8/8     Prognosis guarded/poor         Maria De Jesus Esparza MD  8/6/2022

## 2022-08-07 LAB
ANION GAP SERPL CALCULATED.3IONS-SCNC: 9 MMOL/L (ref 5–15)
BASOPHILS # BLD AUTO: 0.04 10*3/MM3 (ref 0–0.2)
BASOPHILS NFR BLD AUTO: 0.6 % (ref 0–1.5)
BUN SERPL-MCNC: 44 MG/DL (ref 8–23)
BUN/CREAT SERPL: 39.6 (ref 7–25)
CALCIUM SPEC-SCNC: 8.1 MG/DL (ref 8.2–9.6)
CHLORIDE SERPL-SCNC: 99 MMOL/L (ref 98–107)
CO2 SERPL-SCNC: 26 MMOL/L (ref 22–29)
CREAT SERPL-MCNC: 1.11 MG/DL (ref 0.57–1)
DEPRECATED RDW RBC AUTO: 46.9 FL (ref 37–54)
EGFRCR SERPLBLD CKD-EPI 2021: 46.7 ML/MIN/1.73
EOSINOPHIL # BLD AUTO: 0.26 10*3/MM3 (ref 0–0.4)
EOSINOPHIL NFR BLD AUTO: 3.9 % (ref 0.3–6.2)
ERYTHROCYTE [DISTWIDTH] IN BLOOD BY AUTOMATED COUNT: 15.1 % (ref 12.3–15.4)
GLUCOSE BLDC GLUCOMTR-MCNC: 105 MG/DL (ref 70–130)
GLUCOSE BLDC GLUCOMTR-MCNC: 71 MG/DL (ref 70–130)
GLUCOSE BLDC GLUCOMTR-MCNC: 92 MG/DL (ref 70–130)
GLUCOSE BLDC GLUCOMTR-MCNC: 97 MG/DL (ref 70–130)
GLUCOSE SERPL-MCNC: 103 MG/DL (ref 65–99)
HCT VFR BLD AUTO: 39.1 % (ref 34–46.6)
HGB BLD-MCNC: 12.4 G/DL (ref 12–15.9)
IMM GRANULOCYTES # BLD AUTO: 0.06 10*3/MM3 (ref 0–0.05)
IMM GRANULOCYTES NFR BLD AUTO: 0.9 % (ref 0–0.5)
LYMPHOCYTES # BLD AUTO: 0.76 10*3/MM3 (ref 0.7–3.1)
LYMPHOCYTES NFR BLD AUTO: 11.4 % (ref 19.6–45.3)
MAGNESIUM SERPL-MCNC: 2.3 MG/DL (ref 1.7–2.3)
MCH RBC QN AUTO: 27.1 PG (ref 26.6–33)
MCHC RBC AUTO-ENTMCNC: 31.7 G/DL (ref 31.5–35.7)
MCV RBC AUTO: 85.6 FL (ref 79–97)
MONOCYTES # BLD AUTO: 0.72 10*3/MM3 (ref 0.1–0.9)
MONOCYTES NFR BLD AUTO: 10.8 % (ref 5–12)
NEUTROPHILS NFR BLD AUTO: 4.82 10*3/MM3 (ref 1.7–7)
NEUTROPHILS NFR BLD AUTO: 72.4 % (ref 42.7–76)
NRBC BLD AUTO-RTO: 0 /100 WBC (ref 0–0.2)
PLATELET # BLD AUTO: 160 10*3/MM3 (ref 140–450)
PMV BLD AUTO: 10.2 FL (ref 6–12)
POTASSIUM SERPL-SCNC: 3.7 MMOL/L (ref 3.5–5.2)
RBC # BLD AUTO: 4.57 10*6/MM3 (ref 3.77–5.28)
SARS-COV-2 RDRP RESP QL NAA+PROBE: NORMAL
SODIUM SERPL-SCNC: 134 MMOL/L (ref 136–145)
WBC NRBC COR # BLD: 6.66 10*3/MM3 (ref 3.4–10.8)

## 2022-08-07 PROCEDURE — 82962 GLUCOSE BLOOD TEST: CPT

## 2022-08-07 PROCEDURE — 99232 SBSQ HOSP IP/OBS MODERATE 35: CPT | Performed by: PHYSICIAN ASSISTANT

## 2022-08-07 PROCEDURE — 85025 COMPLETE CBC W/AUTO DIFF WBC: CPT | Performed by: INTERNAL MEDICINE

## 2022-08-07 PROCEDURE — 99232 SBSQ HOSP IP/OBS MODERATE 35: CPT | Performed by: INTERNAL MEDICINE

## 2022-08-07 PROCEDURE — 87635 SARS-COV-2 COVID-19 AMP PRB: CPT | Performed by: INTERNAL MEDICINE

## 2022-08-07 PROCEDURE — 83735 ASSAY OF MAGNESIUM: CPT | Performed by: INTERNAL MEDICINE

## 2022-08-07 PROCEDURE — 80048 BASIC METABOLIC PNL TOTAL CA: CPT | Performed by: INTERNAL MEDICINE

## 2022-08-07 PROCEDURE — 25010000002 CEFAZOLIN 1-4 GM/50ML-% SOLUTION: Performed by: INTERNAL MEDICINE

## 2022-08-07 RX ORDER — BUMETANIDE 0.25 MG/ML
2 INJECTION INTRAMUSCULAR; INTRAVENOUS ONCE
Status: COMPLETED | OUTPATIENT
Start: 2022-08-07 | End: 2022-08-07

## 2022-08-07 RX ORDER — CEFAZOLIN SODIUM 1 G/50ML
1 INJECTION, SOLUTION INTRAVENOUS EVERY 12 HOURS
Status: DISCONTINUED | OUTPATIENT
Start: 2022-08-07 | End: 2022-08-08

## 2022-08-07 RX ADMIN — TRAZODONE HYDROCHLORIDE 50 MG: 50 TABLET ORAL at 20:16

## 2022-08-07 RX ADMIN — NYSTATIN 1 APPLICATION: 100000 POWDER TOPICAL at 20:17

## 2022-08-07 RX ADMIN — Medication 10 ML: at 09:27

## 2022-08-07 RX ADMIN — CASTOR OIL AND BALSAM, PERU 1 APPLICATION: 788; 87 OINTMENT TOPICAL at 09:26

## 2022-08-07 RX ADMIN — METOPROLOL TARTRATE 25 MG: 25 TABLET, FILM COATED ORAL at 20:16

## 2022-08-07 RX ADMIN — MONTELUKAST 10 MG: 10 TABLET, FILM COATED ORAL at 20:16

## 2022-08-07 RX ADMIN — Medication 10 MG: at 20:16

## 2022-08-07 RX ADMIN — RIVAROXABAN 15 MG: 15 TABLET, FILM COATED ORAL at 17:30

## 2022-08-07 RX ADMIN — CEFAZOLIN SODIUM 1 G: 1 INJECTION, SOLUTION INTRAVENOUS at 22:48

## 2022-08-07 RX ADMIN — Medication 10 ML: at 17:12

## 2022-08-07 RX ADMIN — Medication 10 ML: at 22:48

## 2022-08-07 RX ADMIN — CASTOR OIL AND BALSAM, PERU 1 APPLICATION: 788; 87 OINTMENT TOPICAL at 20:17

## 2022-08-07 RX ADMIN — CETIRIZINE HYDROCHLORIDE TABLETS 10 MG: 10 TABLET, FILM COATED ORAL at 09:28

## 2022-08-07 RX ADMIN — ACETAMINOPHEN 325MG 650 MG: 325 TABLET ORAL at 20:26

## 2022-08-07 RX ADMIN — Medication 1 CAPSULE: at 09:26

## 2022-08-07 RX ADMIN — NYSTATIN 1 APPLICATION: 100000 POWDER TOPICAL at 09:26

## 2022-08-07 RX ADMIN — BUMETANIDE 2 MG: 0.25 INJECTION INTRAMUSCULAR; INTRAVENOUS at 16:00

## 2022-08-07 RX ADMIN — LEVOTHYROXINE SODIUM 88 MCG: 88 TABLET ORAL at 05:04

## 2022-08-07 RX ADMIN — METOPROLOL TARTRATE 12.5 MG: 25 TABLET, FILM COATED ORAL at 09:26

## 2022-08-07 RX ADMIN — CEFAZOLIN SODIUM 1 G: 1 INJECTION, SOLUTION INTRAVENOUS at 03:23

## 2022-08-07 NOTE — PROGRESS NOTES
"[facility]  Progress Note       LOS: 5 days   Patient Care Team:  Red Perez MD as PCP - General (Family Medicine)  Unruly Kaplan MD as Consulting Physician (Cardiology)    Chief Complaint:  Follow up SOB    Subjective      Patient states she feels \"bad\" today. She was resting when I visited and I woke her up. She continues on 3L with O2 sats 96%. She is tired. She is SOB.     Interval History:     89-year-old female admitted for sepsis.  Patient with likely permanent atrial fibrillation with rapid ventricular response in the setting of illness.  Patient has chronic hypoxic respiratory failure and has been wearing home O2.  The patient's primary cardiologist, Unruly Kaplan has told her that she has a bad valve.  Echo this admission shows severe tricuspid regurgitation and severe pulmonary hypertension    Review of Systems:   Pertinent positives in HPI, all others reviewed and negative.      Objective       Current Facility-Administered Medications:   •  acetaminophen (TYLENOL) tablet 650 mg, 650 mg, Oral, Q6H PRN, Maria C Andersen MD, 650 mg at 08/06/22 2314  •  bumetanide (BUMEX) injection 2 mg, 2 mg, Intravenous, Once, Indy Weber PA  •  castor oil-balsam peru (VENELEX) ointment 1 application, 1 application, Topical, Q12H, Maria C Andersen MD, 1 application at 08/07/22 0926  •  ceFAZolin (ANCEF) IVPB 1 g, 1 g, Intravenous, Q12H, Maria De Jesus Esparza MD, 1 g at 08/07/22 0323  •  cetirizine (zyrTEC) tablet 10 mg, 10 mg, Oral, Daily, Maria C Andersen MD, 10 mg at 08/07/22 0928  •  dextrose (D50W) (25 g/50 mL) IV injection 25 g, 25 g, Intravenous, Q15 Min PRN, Maria C Andersen MD  •  dextrose (GLUTOSE) oral gel 15 g, 15 g, Oral, Q15 Min PRN, Maria C Andersen MD  •  dilTIAZem (CARDIZEM) 125 mg in 125 mL 0.7% sodium chloride  infusion, 5-15 mg/hr, Intravenous, Titrated, Saeed Bush MD, Stopped at 08/02/22 1262  •  glucagon " (human recombinant) (GLUCAGEN DIAGNOSTIC) injection 1 mg, 1 mg, Intramuscular, Q15 Min PRN, Maria C Andersen MD  •  HYDROcodone-acetaminophen (NORCO) 5-325 MG per tablet 1 tablet, 1 tablet, Oral, Q4H PRN, Maria C Andersen MD  •  insulin detemir (LEVEMIR) injection 10 Units, 10 Units, Subcutaneous, Nightly, Maria C Andersen MD, 10 Units at 08/06/22 2202  •  Insulin Lispro (humaLOG) injection 0-7 Units, 0-7 Units, Subcutaneous, TID AC, Maria C Andersen MD, 2 Units at 08/06/22 1839  •  ipratropium-albuterol (DUO-NEB) nebulizer solution 3 mL, 3 mL, Nebulization, Q4H PRN, Maria C Andersen MD, 3 mL at 08/06/22 2354  •  lactobacillus acidophilus (RISAQUAD) capsule 1 capsule, 1 capsule, Oral, Daily, Vilma Chapman MD, 1 capsule at 08/07/22 0926  •  levothyroxine (SYNTHROID, LEVOTHROID) tablet 88 mcg, 88 mcg, Oral, Q AM, Maria C Andersen MD, 88 mcg at 08/07/22 0504  •  melatonin tablet 10 mg, 10 mg, Oral, Nightly, Maria C Andersen MD, 10 mg at 08/06/22 2201  •  metoprolol tartrate (LOPRESSOR) tablet 25 mg, 25 mg, Oral, BID, Indy Weber PA  •  montelukast (SINGULAIR) tablet 10 mg, 10 mg, Oral, Nightly, Maria C Andersen MD, 10 mg at 08/06/22 2201  •  nystatin (MYCOSTATIN) powder 1 application, 1 application, Topical, BID, Maria C Andersen MD, 1 application at 08/07/22 0926  •  ondansetron (ZOFRAN) tablet 4 mg, 4 mg, Oral, Q6H PRN **OR** ondansetron (ZOFRAN) injection 4 mg, 4 mg, Intravenous, Q6H PRN, Maria C Andersen MD  •  rivaroxaban (XARELTO) tablet 15 mg, 15 mg, Oral, Daily With Dinner, Maria C Andersen MD, 15 mg at 08/06/22 1840  •  sodium chloride 0.9 % flush 1-10 mL, 1-10 mL, Intravenous, PRN, Maria C Andersen MD  •  sodium chloride 0.9 % flush 10 mL, 10 mL, Intravenous, PRN, Saeed Bush MD  •  sodium chloride 0.9 % flush 10 mL, 10 mL, Intravenous, Q12H, Maria C Andersen MD, 10 mL at 08/07/22 0927  •  traZODone (DESYREL) tablet  "50 mg, 50 mg, Oral, Nightly, Maria C Andersen MD, 50 mg at 08/06/22 2201    Vital Sign Min/Max for last 24 hours  Temp  Min: 96.8 °F (36 °C)  Max: 98.3 °F (36.8 °C)   BP  Min: 113/94  Max: 139/89   Pulse  Min: 86  Max: 120   Resp  Min: 16  Max: 24   SpO2  Min: 88 %  Max: 97 %   Flow (L/min)  Min: 3  Max: 3   No data recorded     Flowsheet Rows    Flowsheet Row First Filed Value   Admission Height 167.6 cm (66\") Documented at 08/02/2022 0551   Admission Weight 83.9 kg (185 lb) Documented at 08/02/2022 0551            Intake/Output Summary (Last 24 hours) at 8/7/2022 1633  Last data filed at 8/7/2022 0000  Gross per 24 hour   Intake 525 ml   Output 400 ml   Net 125 ml       Physical Exam:     General Appearance:    Alert, cooperative, in no acute distress   Lungs:     Clear to auscultation anteriorly, fair effort.     Heart:    Irr, irr, tachycardic,  normal S1 and S2, no            murmur, no gallop, no rub, no click   Chest Wall:    No abnormalities observed   Abdomen:     Normal bowel sounds, no masses, no organomegaly, soft        non-tender, non-distended, no guarding, no rebound                tenderness   Extremities:   Moves all extremities well, no edema, no cyanosis, no             redness   Pulses:   Pulses palpable and equal bilaterally   Skin:   No bleeding, bruising or rash        Results Review:   Results from last 7 days   Lab Units 08/07/22  1559 08/05/22  0237 08/04/22  0544   WBC 10*3/mm3 6.66 9.84 11.94*   HEMOGLOBIN g/dL 12.4 12.5 12.0   HEMATOCRIT % 39.1 41.1 39.1   PLATELETS 10*3/mm3 160 149 125*     Results from last 7 days   Lab Units 08/05/22  0237 08/04/22  0544 08/03/22  0517   SODIUM mmol/L 133* 131* 137   POTASSIUM mmol/L 3.8 3.5 3.8   CHLORIDE mmol/L 98 97* 98   CO2 mmol/L 26.0 23.0 25.0   BUN mg/dL 51* 48* 47*   CREATININE mg/dL 1.49* 1.40* 1.58*   GLUCOSE mg/dL 112* 122* 110*      Results from last 7 days   Lab Units 08/03/22  0517   HEMOGLOBIN A1C % 9.00*     Results from last 7 " days   Lab Units 08/03/22  0517   CHOLESTEROL mg/dL 82   TRIGLYCERIDES mg/dL 82   HDL CHOL mg/dL 23*     Results from last 7 days   Lab Units 08/02/22  0602   TSH uIU/mL 5.410*   FREE T4 ng/dL 1.38     Results from last 7 days   Lab Units 08/05/22  0237   PROBNP pg/mL 16,610.0*         Results from last 7 days   Lab Units 08/05/22  0237 08/02/22  0602   TROPONIN T ng/mL 0.045* 0.054*       Intake/Output Summary (Last 24 hours) at 8/7/2022 1633  Last data filed at 8/7/2022 0000  Gross per 24 hour   Intake 525 ml   Output 400 ml   Net 125 ml       I personally viewed and interpreted the patient's EKG/Telemetry data    EKG: none for review today.   Telemetry: atrial fibrillation with RVR     Ejection Fraction  No results found for: EF    Echo EF Estimated  Lab Results   Component Value Date    ECHOEFEST 55 08/03/2022         Present on Admission:  • (Resolved) Sepsis with acute hypoxic respiratory failure without septic shock, due to unspecified organism ([facility])  • Permanent atrial fibrillation (HCC)  • (Resolved) Elevated serum creatinine  • (Resolved) Thrombocytopenia (HCC)  • (Resolved) Lactic acidosis  • Hypomagnesemia  • (Resolved) HTN (hypertension)  • Hypothyroidism  • CKD (chronic kidney disease) stage 3, GFR 30-59 ml/min ([facility])  • Pulmonary hypertension (HCC)  • (Resolved) Pulmonary hypertension (HCC)  • (Resolved) Mitral valve regurgitation  • T2DM (type 2 diabetes mellitus) ([facility])  • Sepsis due to group B Streptococcus ([facility])  • Coronary artery disease involving native coronary artery of native heart with angina pectoris ([facility])    Assessment & Plan   1. Permanent Atrial fibrillation  a. CHADSVASC = 5  HAS-BLED = 3 on Xarelto renal dose  b. Echo 8/3/22: moderate to severely dilated RA, moderate to severe TR, RVSP 68 mmHg, EF 51-55%, RV moderately dilated, reduced right systolic function  c. Rates elevated, will increase Metoprolol to 25 mg BID as BP appears to have increased and will tolerate. Although may be  difficult to control rates with sepsis.  2. Moderate to severe TR  3. Severe pulmonary hypertension  4. History of mitral valve repair- data deficit  5.  Sepsis  UTI  a. Per medicine and ID   6. Acute on chronic respiratory failure  Right sided heart failure  a. She has chronic hypoxia for which she reports being on continuous home O2 at unknown liters  b. Echo: normal LVEF, reduced right systolic heart failure  c. Chronic O2 at home. Has been stable on 3L  d. Will give another dose of Bumex 2 mg IV x 1 if Cr is stable on BMP today. Still not resulted today. Discussed with RN  7. Hypertension  8. Hyperlipidemia  9. Type II diabetes, insulin dependent  10. Hypothyroidism  11. Chronic kidney disease  12. DNI/No CPR/No cardioversion/No vasopressors        Electronically signed by JA Slater, 08/07/22, 4:22 PM EDT.

## 2022-08-07 NOTE — PROGRESS NOTES
[facility]  PROGRESS NOTE    Patient Name: Rosanna Browne  : 1930  MRN: [id number]    Date of Admission: 2022  Primary Care Physician: Red Perez MD    Subjective   Subjective     CC:  Bacteremia    HPI:  Patient is not feeling very well this morning, having shortness of breath.  Requiring 3 L of oxygen.  Afebrile overnight.    ROS:  General: denies fevers or chills  CV: denies chest pain  Resp: Positive for shortness of breath  Abd: denies abd pain, nausea      Objective   Objective     Vital Signs:   Temp:  [97.5 °F (36.4 °C)-98.4 °F (36.9 °C)] 98 °F (36.7 °C)  Heart Rate:  [] 102  Resp:  [18-24] 20  BP: (113-131)/(72-94) 131/72  Flow (L/min):  [3] 3     Physical Exam:  Constitutional: Elderly female, appears ill but nontoxic, hard of hearing  Respiratory: Decreased breath sounds bilaterally with wheezing in her upper airway, mildly increased respiratory effort while sleeping  Cardiovascular: RRR, no murmurs, rubs, or gallops  Gastrointestinal: Positive bowel sounds, soft, nontender, nondistended  Musculoskeletal: No bilateral ankle edema  Psychiatric: Appropriate affect, cooperative  Neurologic: Oriented x 3, no focal neurological deficits  Skin: Bilateral venous stasis changes with scabbing, no significant erythema      Results Reviewed:  LAB RESULTS:      Lab 22  0237 22  0544 22  0517 22  0016 22  1637 22  1250 22  0938 22  0602   WBC 9.84 11.94* 15.54*  --   --   --   --  15.98*   HEMOGLOBIN 12.5 12.0 11.7*  --   --   --   --  13.5   HEMATOCRIT 41.1 39.1 37.2  --   --   --   --  43.3   PLATELETS 149 125* 119*  --   --   --   --  137*   NEUTROS ABS 8.02* 10.16*  --   --   --   --   --  14.26*   IMMATURE GRANS (ABS) 0.04 0.05  --   --   --   --   --  0.16*   LYMPHS ABS 1.00 0.97  --   --   --   --   --  1.04   MONOS ABS 0.58 0.62  --   --   --   --   --  0.46   EOS ABS 0.16 0.10  --   --   --    --   --  0.02   MCV 87.4 86.5 85.9  --   --   --   --  85.7   CRP  --   --   --   --   --   --   --  13.77*   PROCALCITONIN 13.49*  --   --   --   --   --   --  4.82*   LACTATE 1.1  --   --  1.9 2.3* 3.4* 3.1* 2.9*   D DIMER QUANT 5.41* 4.62*  --   --   --   --   --   --          Lab 08/05/22 0237 08/04/22  0544 08/03/22  0517 08/02/22  0602   SODIUM 133* 131* 137 133*   POTASSIUM 3.8 3.5 3.8 4.2   CHLORIDE 98 97* 98 92*   CO2 26.0 23.0 25.0 27.0   ANION GAP 9.0 11.0 14.0 14.0   BUN 51* 48* 47* 43*   CREATININE 1.49* 1.40* 1.58* 1.37*   EGFR 32.8* 35.4* 30.6* 36.3*   GLUCOSE 112* 122* 110* 250*   CALCIUM 7.7* 7.4* 7.2* 8.6   MAGNESIUM 2.2 2.2 1.6* 1.5*   PHOSPHORUS  --   --   --  2.9   HEMOGLOBIN A1C  --   --  9.00*  --    TSH  --   --   --  5.410*         Lab 08/05/22  0237 08/04/22  0544 08/02/22  0602   TOTAL PROTEIN 7.0 7.2 8.1   ALBUMIN 3.10* 2.90* 3.70   GLOBULIN 3.9 4.3 4.4   ALT (SGPT) 13 12 13   AST (SGOT) 22 25 30   BILIRUBIN 0.5 0.6 1.4*   ALK PHOS 79 136* 133*   LIPASE  --   --  17         Lab 08/05/22 0237 08/02/22  0602   PROBNP 16,610.0* 9,106.0*   TROPONIN T 0.045* 0.054*         Lab 08/03/22  0517   CHOLESTEROL 82   LDL CHOL 42   HDL CHOL 23*   TRIGLYCERIDES 82             Lab 08/05/22  0209 08/02/22  0704   PH, ARTERIAL 7.277* 7.420   PCO2, ARTERIAL 60.3* 43.8   PO2 ART 94.0 63.8*   FIO2 40 44   HCO3 ART 28.1* 28.4*   BASE EXCESS ART 0.2 3.3*   CARBOXYHEMOGLOBIN 1.0 1.4     Brief Urine Lab Results  (Last result in the past 365 days)      Color   Clarity   Blood   Leuk Est   Nitrite   Protein   CREAT   Urine HCG        08/02/22 0629 Yellow   Cloudy   Moderate (2+)   Moderate (2+)   Negative   >=300 mg/dL (3+)                 Microbiology Results Abnormal     Procedure Component Value - Date/Time    Gastrointestinal Panel, PCR - Stool, Per Rectum [[id number]]  (Normal) Collected: 08/04/22 1127    Lab Status: Final result Specimen: Stool from Per Rectum Updated: 08/04/22 1315     Campylobacter Not  Detected     Plesiomonas shigelloides Not Detected     Salmonella Not Detected     Vibrio Not Detected     Vibrio cholerae Not Detected     Yersinia enterocolitica Not Detected     Enteroaggregative E. coli (EAEC) Not Detected     Enteropathogenic E. coli (EPEC) Not Detected     Enterotoxigenic E. coli (ETEC) lt/st Not Detected     Shiga-like toxin-producing E. coli (STEC) stx1/stx2 Not Detected     Shigella/Enteroinvasive E. coli (EIEC) Not Detected     Cryptosporidium Not Detected     Cyclospora cayetanensis Not Detected     Entamoeba histolytica Not Detected     Giardia lamblia Not Detected     Adenovirus F40/41 Not Detected     Astrovirus Not Detected     Norovirus GI/GII Not Detected     Rotavirus A Not Detected     Sapovirus (I, II, IV or V) Not Detected    COVID PRE-OP / PRE-PROCEDURE SCREENING ORDER (NO ISOLATION) - Swab, Nasopharynx [[id number]]  (Normal) Collected: 08/02/22 0607    Lab Status: Final result Specimen: Swab from Nasopharynx Updated: 08/02/22 0721    Narrative:      The following orders were created for panel order COVID PRE-OP / PRE-PROCEDURE SCREENING ORDER (NO ISOLATION) - Swab, Nasopharynx.  Procedure                               Abnormality         Status                     ---------                               -----------         ------                     Respiratory Panel PCR w/...[[id number]]  Normal              Final result                 Please view results for these tests on the individual orders.    Respiratory Panel PCR w/COVID-19(SARS-CoV-2) BRENTON/ELLA/HAILEE/PAD/COR/MAD/DEANN In-House, NP Swab in UTM/VTM, 3-4 HR TAT - Swab, Nasopharynx [[id number]]  (Normal) Collected: 08/02/22 0607    Lab Status: Final result Specimen: Swab from Nasopharynx Updated: 08/02/22 0721     ADENOVIRUS, PCR Not Detected     Coronavirus 229E Not Detected     Coronavirus HKU1 Not Detected     Coronavirus NL63 Not Detected     Coronavirus OC43 Not Detected     COVID19 Not Detected     Human Metapneumovirus  Not Detected     Human Rhinovirus/Enterovirus Not Detected     Influenza A PCR Not Detected     Influenza B PCR Not Detected     Parainfluenza Virus 1 Not Detected     Parainfluenza Virus 2 Not Detected     Parainfluenza Virus 3 Not Detected     Parainfluenza Virus 4 Not Detected     RSV, PCR Not Detected     Bordetella pertussis pcr Not Detected     Bordetella parapertussis PCR Not Detected     Chlamydophila pneumoniae PCR Not Detected     Mycoplasma pneumo by PCR Not Detected    Narrative:      In the setting of a positive respiratory panel with a viral infection PLUS a negative procalcitonin without other underlying concern for bacterial infection, consider observing off antibiotics or discontinuation of antibiotics and continue supportive care. If the respiratory panel is positive for atypical bacterial infection (Bordetella pertussis, Chlamydophila pneumoniae, or Mycoplasma pneumoniae), consider antibiotic de-escalation to target atypical bacterial infection.          No radiology results from the last 24 hrs    Results for orders placed during the hospital encounter of 08/02/22    Adult Transthoracic Echo Complete W/ Cont if Necessary Per Protocol    Interpretation Summary  · The right atrial cavity is moderate to severely dilated.  · There is a small left pleural effusion. There is a small right pleural effusion.  · Moderate to severe tricuspid valve regurgitation is present.  · Estimated right ventricular systolic pressure from tricuspid regurgitation is markedly elevated (>55 mmHg). Calculated right ventricular systolic pressure from tricuspid regurgitation is 68 mmHg.  · Estimated left ventricular EF = 55% Left ventricular ejection fraction appears to be 51 - 55%. Left ventricular systolic function is normal.  · Moderate to severe pulmonary hypertension is present.  · Systolic and diastolic flattening of the interventricular septum consistent with right ventricle pressure and volume overload.  · The right  ventricular cavity is moderately dilated with normal right ventricular wall thickness and severely reduced right ventricular systolic function; abnormal septal motion.  · There is no evidence of pericardial effusion.      I have reviewed the medications:  Scheduled Meds:castor oil-balsam peru, 1 application, Topical, Q12H  ceFAZolin, 1 g, Intravenous, Q12H  cetirizine, 10 mg, Oral, Daily  insulin detemir, 10 Units, Subcutaneous, Nightly  insulin lispro, 0-7 Units, Subcutaneous, TID AC  lactobacillus acidophilus, 1 capsule, Oral, Daily  levothyroxine, 88 mcg, Oral, Q AM  melatonin, 10 mg, Oral, Nightly  metoprolol tartrate, 12.5 mg, Oral, BID  montelukast, 10 mg, Oral, Nightly  nystatin, 1 application, Topical, BID  rivaroxaban, 15 mg, Oral, Daily With Dinner  sodium chloride, 10 mL, Intravenous, Q12H  traZODone, 50 mg, Oral, Nightly      Continuous Infusions:dilTIAZem, 5-15 mg/hr, Last Rate: Stopped (08/02/22 2145)      PRN Meds:.•  acetaminophen  •  dextrose  •  dextrose  •  glucagon (human recombinant)  •  HYDROcodone-acetaminophen  •  ipratropium-albuterol  •  ondansetron **OR** ondansetron  •  sodium chloride  •  sodium chloride    Assessment & Plan   Assessment & Plan     Active Hospital Problems    Diagnosis  POA   • **Sepsis due to group B Streptococcus ([facility]) [A40.1]  Yes   • Coronary artery disease involving native coronary artery of native heart with angina pectoris ([facility]) [I25.119]  Yes   • Permanent atrial fibrillation ([facility]) [I48.21]  Yes   • Hypomagnesemia [E83.42]  Yes   • Hypothyroidism [E03.9]  Yes   • CKD (chronic kidney disease) stage 3, GFR 30-59 ml/min ([facility]) [N18.30]  Yes   • Pulmonary hypertension ([facility]) [I27.20]  Yes   • T2DM (type 2 diabetes mellitus) ([facility]) [E11.9]  Yes      Resolved Hospital Problems    Diagnosis Date Resolved POA   • Sepsis with acute hypoxic respiratory failure without septic shock, due to unspecified organism ([facility]) [A41.9, R65.20, J96.01] 08/05/2022 Yes   • Elevated serum  creatinine [R79.89] 08/05/2022 Yes   • Thrombocytopenia (HCC) [D69.6] 08/05/2022 Yes   • Lactic acidosis [E87.2] 08/05/2022 Yes   • HTN (hypertension) [I10] 08/05/2022 Yes   • HLD (hyperlipidemia) [E78.5] 08/05/2022 Unknown   • Pulmonary hypertension (HCC) [I27.20] 08/05/2022 Yes   • Mitral valve regurgitation [I34.0] 08/05/2022 Yes        Brief Hospital Course to date:  Rosanna Browne is a 89 y.o. female with PMH of HTN, HLD, T2DM on insulin, Hypothyroidism, Severe Pulmonary HTN, MVR, HFpEF, Afib on Xarelto and CKD II presented from [facility] with severe sepsis. Pt also noted to be hypoxic and in Afib with RVR. Febrile with leukocytosis, elevated lactate, elevated procal on presentation. CXr unremarkable. CT chest without contrast unremakable. RVP (including COVID19) negative. GI PCR negative     Severe Sepsis, resolved  Group B Strep Bacteremia  RLE cellulitis, improving  -suspect RLE cellulitis vs urinary source given UA with moderate LE, 3-5 WBCs and trace bacteria   -TTE with no evidence of endocarditis.   -blood Cx with GBS, appears it is in all four bottles sent from admission   -Urine culture growing Proteus, susceptible to cefazolin  -ID consulted.  Patient to continue cefazolin until 8/8 then changed to cefuroxime to 50 mg twice daily until 8/15.     Hypoxia, stable  H/o Severe Pulmonary HTN  Pneumonia  H/o HFpEF  -- pt with noted hypoxia on RA at NH.  Wears supplemental O2 continuously at baseline but unsure of how many liters. Currently stable on 3L  -- CT chest unremarkable  -- s/p IV Lasix in the ED. S/p albumin and Bumex per cards on 8/6  -- TTE with moderate to severe Pulm HTN, right ventricular dysfunction, normal LVEF.   - Consider additional diuresis today per cards.      Lactic acidosis  - noted on presentation, resolved.      Afib with RVR  -- [id number] of 5, on Xarelto at home- dose adjusted based on renal function  -- continue Metoprolol succinate as BP  tolerates     T2DM  -- hold home meds  -- HbA1C 9% c/w poor control at home  -- SSI with low dose basal insulin for now  -- Glucoses from the 120s to 160s for the past 24 hours     HTN  -- Normotensive  -- Continue metoprolol     HLD  -- LDL at goal      Elevated creatinine on CKD II  -- unsure of baseline Cr.  Suspect her baseline may be 1.4-1.6 based off of measurements here  -- monitor/renally dose meds  - BMP in am     Hypomagnesemia  -- replace per protocol     TCP, resolved  -- mild, now resolved. Likely due to sepsis     Hypothyroidism  -- TSH elevated  --  home dose Synthroid increased from 75 mcg to 88mcg.  Needs repeat TSH in 4-6 weeks           Expected Discharge Location and Transportation: [facility], likely back to NH  Expected Discharge Date: 8/8/22-8/9    DVT prophylaxis:  Medical DVT prophylaxis orders are present.     AM-PAC 6 Clicks Score (PT): 10 (08/06/22 0800)    CODE STATUS:   Code Status and Medical Interventions:   Ordered at: 08/02/22 1206     Medical Intervention Limits:    NO intubation (DNI)    NO cardioversion    NO dialysis    NO vasopressors    NO artificial nutrition     Level Of Support Discussed With:    Patient     Code Status (Patient has no pulse and is not breathing):    No CPR (Do Not Attempt to Resuscitate)     Medical Interventions (Patient has pulse or is breathing):    Limited Support     I have prepared this progress note with copied portions of the prior day's progress note of my own authorship to preserve accuracy and maintain consistency of documentation. I have reviewed these portions and edited them for correctness. I verify that the above documentation accurately and truly represents the evaluation and management performed on today's date.       Vilma Chapman MD  08/07/22

## 2022-08-08 ENCOUNTER — APPOINTMENT (OUTPATIENT)
Dept: GENERAL RADIOLOGY | Facility: HOSPITAL | Age: 87
End: 2022-08-08

## 2022-08-08 PROBLEM — I24.8 DEMAND ISCHEMIA: Status: ACTIVE | Noted: 2022-08-08

## 2022-08-08 LAB
ANION GAP SERPL CALCULATED.3IONS-SCNC: 9 MMOL/L (ref 5–15)
BASOPHILS # BLD AUTO: 0.04 10*3/MM3 (ref 0–0.2)
BASOPHILS NFR BLD AUTO: 0.5 % (ref 0–1.5)
BUN SERPL-MCNC: 46 MG/DL (ref 8–23)
BUN/CREAT SERPL: 36.2 (ref 7–25)
CALCIUM SPEC-SCNC: 8.1 MG/DL (ref 8.2–9.6)
CHLORIDE SERPL-SCNC: 100 MMOL/L (ref 98–107)
CO2 SERPL-SCNC: 29 MMOL/L (ref 22–29)
CREAT SERPL-MCNC: 1.27 MG/DL (ref 0.57–1)
DEPRECATED RDW RBC AUTO: 48 FL (ref 37–54)
EGFRCR SERPLBLD CKD-EPI 2021: 39.8 ML/MIN/1.73
EOSINOPHIL # BLD AUTO: 0.18 10*3/MM3 (ref 0–0.4)
EOSINOPHIL NFR BLD AUTO: 2.2 % (ref 0.3–6.2)
ERYTHROCYTE [DISTWIDTH] IN BLOOD BY AUTOMATED COUNT: 15.3 % (ref 12.3–15.4)
GLUCOSE BLDC GLUCOMTR-MCNC: 103 MG/DL (ref 70–130)
GLUCOSE BLDC GLUCOMTR-MCNC: 110 MG/DL (ref 70–130)
GLUCOSE BLDC GLUCOMTR-MCNC: 173 MG/DL (ref 70–130)
GLUCOSE BLDC GLUCOMTR-MCNC: 99 MG/DL (ref 70–130)
GLUCOSE SERPL-MCNC: 114 MG/DL (ref 65–99)
HCT VFR BLD AUTO: 41.8 % (ref 34–46.6)
HGB BLD-MCNC: 12.8 G/DL (ref 12–15.9)
IMM GRANULOCYTES # BLD AUTO: 0.07 10*3/MM3 (ref 0–0.05)
IMM GRANULOCYTES NFR BLD AUTO: 0.8 % (ref 0–0.5)
LYMPHOCYTES # BLD AUTO: 1.27 10*3/MM3 (ref 0.7–3.1)
LYMPHOCYTES NFR BLD AUTO: 15.2 % (ref 19.6–45.3)
MCH RBC QN AUTO: 26.4 PG (ref 26.6–33)
MCHC RBC AUTO-ENTMCNC: 30.6 G/DL (ref 31.5–35.7)
MCV RBC AUTO: 86.4 FL (ref 79–97)
MONOCYTES # BLD AUTO: 0.69 10*3/MM3 (ref 0.1–0.9)
MONOCYTES NFR BLD AUTO: 8.3 % (ref 5–12)
NEUTROPHILS NFR BLD AUTO: 6.11 10*3/MM3 (ref 1.7–7)
NEUTROPHILS NFR BLD AUTO: 73 % (ref 42.7–76)
NRBC BLD AUTO-RTO: 0 /100 WBC (ref 0–0.2)
PLATELET # BLD AUTO: 189 10*3/MM3 (ref 140–450)
PMV BLD AUTO: 10 FL (ref 6–12)
POTASSIUM SERPL-SCNC: 3.9 MMOL/L (ref 3.5–5.2)
RBC # BLD AUTO: 4.84 10*6/MM3 (ref 3.77–5.28)
SODIUM SERPL-SCNC: 138 MMOL/L (ref 136–145)
WBC NRBC COR # BLD: 8.36 10*3/MM3 (ref 3.4–10.8)

## 2022-08-08 PROCEDURE — 82962 GLUCOSE BLOOD TEST: CPT

## 2022-08-08 PROCEDURE — 80048 BASIC METABOLIC PNL TOTAL CA: CPT | Performed by: INTERNAL MEDICINE

## 2022-08-08 PROCEDURE — 63710000001 INSULIN DETEMIR PER 5 UNITS: Performed by: INTERNAL MEDICINE

## 2022-08-08 PROCEDURE — 99232 SBSQ HOSP IP/OBS MODERATE 35: CPT | Performed by: STUDENT IN AN ORGANIZED HEALTH CARE EDUCATION/TRAINING PROGRAM

## 2022-08-08 PROCEDURE — 85025 COMPLETE CBC W/AUTO DIFF WBC: CPT | Performed by: INTERNAL MEDICINE

## 2022-08-08 PROCEDURE — 71045 X-RAY EXAM CHEST 1 VIEW: CPT

## 2022-08-08 PROCEDURE — 99232 SBSQ HOSP IP/OBS MODERATE 35: CPT | Performed by: NURSE PRACTITIONER

## 2022-08-08 RX ORDER — CEFUROXIME AXETIL 250 MG/1
250 TABLET ORAL EVERY 12 HOURS SCHEDULED
Status: DISCONTINUED | OUTPATIENT
Start: 2022-08-08 | End: 2022-08-09 | Stop reason: HOSPADM

## 2022-08-08 RX ORDER — BUMETANIDE 0.25 MG/ML
2 INJECTION INTRAMUSCULAR; INTRAVENOUS ONCE
Status: COMPLETED | OUTPATIENT
Start: 2022-08-08 | End: 2022-08-08

## 2022-08-08 RX ADMIN — CASTOR OIL AND BALSAM, PERU 1 APPLICATION: 788; 87 OINTMENT TOPICAL at 21:59

## 2022-08-08 RX ADMIN — Medication 10 MG: at 21:59

## 2022-08-08 RX ADMIN — CETIRIZINE HYDROCHLORIDE TABLETS 10 MG: 10 TABLET, FILM COATED ORAL at 09:00

## 2022-08-08 RX ADMIN — INSULIN DETEMIR 10 UNITS: 100 INJECTION, SOLUTION SUBCUTANEOUS at 22:00

## 2022-08-08 RX ADMIN — Medication 10 ML: at 08:30

## 2022-08-08 RX ADMIN — Medication 1 CAPSULE: at 09:00

## 2022-08-08 RX ADMIN — CASTOR OIL AND BALSAM, PERU 1 APPLICATION: 788; 87 OINTMENT TOPICAL at 08:30

## 2022-08-08 RX ADMIN — CEFUROXIME AXETIL 250 MG: 250 TABLET, FILM COATED ORAL at 22:00

## 2022-08-08 RX ADMIN — BUMETANIDE 2 MG: 0.25 INJECTION INTRAMUSCULAR; INTRAVENOUS at 11:15

## 2022-08-08 RX ADMIN — Medication 10 ML: at 11:16

## 2022-08-08 RX ADMIN — NYSTATIN 1 APPLICATION: 100000 POWDER TOPICAL at 08:30

## 2022-08-08 RX ADMIN — METOPROLOL TARTRATE 25 MG: 25 TABLET, FILM COATED ORAL at 22:10

## 2022-08-08 RX ADMIN — MONTELUKAST 10 MG: 10 TABLET, FILM COATED ORAL at 21:59

## 2022-08-08 RX ADMIN — NYSTATIN 1 APPLICATION: 100000 POWDER TOPICAL at 22:00

## 2022-08-08 RX ADMIN — Medication 10 ML: at 22:01

## 2022-08-08 RX ADMIN — METOPROLOL TARTRATE 25 MG: 25 TABLET, FILM COATED ORAL at 09:00

## 2022-08-08 RX ADMIN — RIVAROXABAN 15 MG: 15 TABLET, FILM COATED ORAL at 18:00

## 2022-08-08 RX ADMIN — LEVOTHYROXINE SODIUM 88 MCG: 88 TABLET ORAL at 05:45

## 2022-08-08 RX ADMIN — CEFUROXIME AXETIL 250 MG: 250 TABLET, FILM COATED ORAL at 11:00

## 2022-08-08 RX ADMIN — TRAZODONE HYDROCHLORIDE 50 MG: 50 TABLET ORAL at 22:10

## 2022-08-08 NOTE — CASE MANAGEMENT/SOCIAL WORK
Continued Stay Note  [facility]     Patient Name: Rosanna Browne  MRN: [id number]  Today's Date: 8/8/2022    Admit Date: 8/2/2022     Discharge Plan     Row Name 08/08/22 1450       Plan    Plan [facility] Comments Per Dr. Carbone, Mrs. Browne is not medically ready for hospital discharge today. Palliative care has been consulted to discuss goals of care with Mrs. Browne's family. I updated Edgar in admissions at [place]. They can accept Mrs. Browne back. I have tentatively scheduled [facility] ambulance to transport tomorrow at 1345.               Discharge Codes    No documentation.               Expected Discharge Date and Time     Expected Discharge Date Expected Discharge Time    Aug 9, 2022             Tate Sifuentes RN

## 2022-08-08 NOTE — CONSULTS
Palliative Care Initial Consult   Attending Physician: Danielle Carbone MD  Referring Provider: Dr. Danielle Carbone    Reason for Referral:  assistance with clarification of goals of care    Code Status:   Code Status and Medical Interventions:   Ordered at: 08/02/22 1206     Medical Intervention Limits:    NO intubation (DNI)    NO cardioversion    NO dialysis    NO vasopressors    NO artificial nutrition     Level Of Support Discussed With:    Patient     Code Status (Patient has no pulse and is not breathing):    No CPR (Do Not Attempt to Resuscitate)     Medical Interventions (Patient has pulse or is breathing):    Limited Support      Advanced Directives: EMS DNR  Family/Support: Chelsea Luna (dtr), Charissa Carvajal  Goals of Care: TBD.    HPI: Rosanna Hannah is a 89 y.o. female with PMH significant for HTN, HLD, [id number], hypothyroidism, severe pulmonary HTN, MVR, HFpEF, A-fib on Xarelto and CKD II. Patient presented from [facility] on 8/2 with severe sepsis, febrile, leukocytosis, elevated lactate, procal elevated, on Vanc/Zosyn. On continuous Oxygen at baseline, patient does not remember how many L per NC, with acute hypoxic respiratory failure and required NRB, currently on 2LNC, Cr elevated. CR of chest showed coronary calcification, A-fib rate controlled and anticoagulated, ECHO with normal LVEF but RV overload from severe pulmonary HTN, right leg with cellulitis group B streptococcus, proteus UTI. Cardiology and ID following with recommendation for palliative/hospice involvement. Patient to discharge back to [facility] tomorrow for rehab. Palliative Care consulted for GOC in the context of complex medical decision making.  Patient [place] but alert and oriented to self, location, and year. Patient reports being tired of coming back to the hospital with frequent hospitalizations and would prefer to avoid hospitalizations. She reports not wanting PT and would prefer to focus on her comfort/quality of life. Patient  "reports that her daughter Charissa is her medical POA. Patient reports that she lives at [facility] and has used a wheelchair and cane for the past 7 years. Patient with one dose Tylenol 650mg PO in last 24 hours, no prn Norco.     ROS: +SOA, currently on 2LNC. +pain, bilateral lower extremities, due to cellulitis. Patient denies N/V/D, constipation, anxiety. All other ROS negative.        Past Medical History:   Diagnosis Date   • A-fib (HCC)    • CHF (congestive heart failure) (HCC)    • CKD (chronic kidney disease), stage II    • Diabetes (HCC)    • HLD (hyperlipidemia)    • Hypertension    • Pulmonary hypertension (HCC)    • Thyroid disease      Past Surgical History:   Procedure Laterality Date   • CHOLECYSTECTOMY     • HIP ARTHROPLASTY     • TUBAL ABDOMINAL LIGATION       Social History     Socioeconomic History   • Marital status:    Tobacco Use   • Smoking status: Former Smoker     Types: Cigarettes   • Smokeless tobacco: Never Used   Substance and Sexual Activity   • Alcohol use: Never   • Drug use: Never     History reviewed. No pertinent family history.    Allergies   Allergen Reactions   • Lisinopril Unknown - High Severity       Current medication reviewed for route, type, dose and frequency and are current per MAR at time of dictation.    Palliative Performance Scale Score:  40%    /75 (BP Location: Right arm, Patient Position: Lying)   Pulse (!) 124   Temp 97.1 °F (36.2 °C) (Oral)   Resp 18   Ht 167.6 cm (66\")   Wt 91.9 kg (202 lb 8 oz)   SpO2 93%   BMI 32.68 kg/m²     Intake/Output Summary (Last 24 hours) at 8/8/2022 1514  Last data filed at 8/7/2022 2011  Gross per 24 hour   Intake --   Output 550 ml   Net -550 ml       Physical Exam:    General Appearance:    Patient laying in bed, awake, alert, cooperative, NAD, Elim IRA   HEENT:    NC/AT, EOMI, anicteric, MM dry, face relaxed   Neck:   supple, trachea midline, no JVD   Lungs:     CTA bilat, diminished in bases; " respirations regular, even and unlabored; RR 18-24 on exam, on 2LNC    Heart:    RRR, normal S1 and S2, no M/R/G,  on monitor   Abdomen:     Normal bowel sounds, soft, non-tender, non-distended   G/U:   Purewick with straw colored urine out   MSK/Extremities:   Wasting, +1 edema to BLE's   Pulses:   Pulses palpable and equal bilaterally   Skin:   Warm, dry, cellulitis to BLE's   Neurologic:   A/Ox3, cooperative, ERNST   Psych:   Calm, appropriate         Labs:   Results from last 7 days   Lab Units 08/08/22  0738   WBC 10*3/mm3 8.36   HEMOGLOBIN g/dL 12.8   HEMATOCRIT % 41.8   PLATELETS 10*3/mm3 189     Results from last 7 days   Lab Units 08/08/22  0738   SODIUM mmol/L 138   POTASSIUM mmol/L 3.9   CHLORIDE mmol/L 100   CO2 mmol/L 29.0   BUN mg/dL 46*   CREATININE mg/dL 1.27*   GLUCOSE mg/dL 114*   CALCIUM mg/dL 8.1*     Results from last 7 days   Lab Units 08/08/22  0738 08/07/22  1559 08/05/22  0237   SODIUM mmol/L 138   < > 133*   POTASSIUM mmol/L 3.9   < > 3.8   CHLORIDE mmol/L 100   < > 98   CO2 mmol/L 29.0   < > 26.0   BUN mg/dL 46*   < > 51*   CREATININE mg/dL 1.27*   < > 1.49*   CALCIUM mg/dL 8.1*   < > 7.7*   BILIRUBIN mg/dL  --   --  0.5   ALK PHOS U/L  --   --  79   ALT (SGPT) U/L  --   --  13   AST (SGOT) U/L  --   --  22   GLUCOSE mg/dL 114*   < > 112*    < > = values in this interval not displayed.     Imaging Results (Last 72 Hours)     Procedure Component Value Units Date/Time    XR Chest 1 View [[id number]] Collected: 08/08/22 1040     Updated: 08/08/22 1045    Narrative:      DATE OF EXAM: 8/8/2022 9:00 AM     PROCEDURE: XR CHEST 1 VW-     INDICATIONS: chf; A41.9-Sepsis, unspecified organism; R65.20-Severe  sepsis without septic shock; J96.01-Acute respiratory failure with  hypoxia; I48.91-Unspecified atrial fibrillation; I50.33-Acute on chronic  diastolic (congestive) heart failure     COMPARISON: 8/5/2022     TECHNIQUE: Single radiographic AP view of the chest was obtained.      FINDINGS:  Heart is enlarged. Vasculature is cephalized. There is increasing hazy  opacity of the right hemithorax, which may represent worsening  pneumonia. As the current exam is semiupright, this could also represent  a layered pleural effusion. Previous 8/2/2022 CT scan did show a  moderate effusion. Left lung actually appears improved, with mild,  decreasing left midlung airspace disease, and mild left basilar  atelectasis or effusion. No pneumothorax is seen. Basilar discoid        Impression:         1. Increasing hazy opacity of the right hemithorax, whether worsening  pneumonia, asymmetric edema, or layering of pleural effusion.  2. Mild remaining left midlung airspace disease which appears improved.  3. Persistent mild CHF     This report was finalized on 8/8/2022 10:42 AM by Dr. Rishi Whitehead MD.               Lab 08/03/22  0517   HEMOGLOBIN A1C 9.00*         Diagnostics: Reviewed    A:   Patient Active Problem List   Diagnosis   • Permanent atrial fibrillation (HCC)   • Hypomagnesemia   • Hypothyroidism   • CKD (chronic kidney disease) stage 3, GFR 30-59 ml/min (HCC)   • Pulmonary hypertension (HCC)   • T2DM (type 2 diabetes mellitus) (HCC)   • Sepsis due to group B Streptococcus (HCC)   • Coronary artery disease involving native coronary artery of native heart with angina pectoris (HCC)   • Demand ischemia (HCC)     89 y.o. female with cellulitis, sepsis, pneumonia, UTI, CHF, A-fib.    S/S:   1. Pain -bilateral lower extremities, cellulitis, infective process, MSK  -continue Tylenol 650mg PO q 4 hours prn pain    2. Dyspnea -currently on 2LNC  -recommend starting Morphine 5mg PO q 4 hours prn dyspnea/pain pending GOC    3. Debility -PT/OT recommending SNF with rehab    4. Decreased appetite -offer supplements/Boost/Ensure    5. GOC -DNR/DNI per review of records  -discussed Full Treatment with return to hospital, Palliative to follow at SNF with rehab versus comfort focused treatment plan with hospice to  follow at [facility] with patient and daughter Charissa on phone  -hospice referral placed for daughter Charissa to receive further information regarding outpatient hospice  -palliative to follow if patient discharges to [facility] for rehab then transition to LTC    P: Introduced Palliative Care and services to patient and later to daughter on phone.  Please see above for patient assessment and medication recommendations. Spoke with Charissa on the phone who reports she is the medical POA but discussed everything with her sister. Lengthy discussion regarding continued Full Treatment with patient discharging to [facility] for rehab versus comfort focused plan of care with patient discharging to [facility] with hospice. Daughter would like more information regarding hospice, referral placed. Palliative will follow if patient is discharging to [facility] with rehab and potential for transition to hospice. All questions and concerns addressed. Daughter to follow up with Palliative Care tomorrow if any further questions and if would like for transition to hospice at discharge versus after rehab.    Thank you for this consult and allowing us to participate in patient's plan of care. Palliative Care Team will continue to follow patient. Please do not hesitate to contact us regarding further symptom management or goals of care needs.  Time: 60 minutes spent reviewing medical and medication records, assessing and examining patient, discussing with family, answering questions, providing some guidance about a plan and documentation of care, and coordinating care with other healthcare members, with > 50% time spent face to face.         Suzette Burt, APRN  8/8/2022

## 2022-08-08 NOTE — PROGRESS NOTES
[facility] Medicine Services  PROGRESS NOTE    Patient Name: Rosanna Browne  : 1930  MRN: [id number]    Date of Admission: 2022  Primary Care Physician: Red Perez MD    Subjective   Subjective     CC:  Bacteremia    HPI:  Increased respiratory distress this am, not very interactive    ROS:  General: denies fevers or chills  CV: denies chest pain  Resp: Positive for shortness of breath  Abd: denies abd pain, nausea      Objective   Objective     Vital Signs:   Temp:  [97.1 °F (36.2 °C)-98.4 °F (36.9 °C)] 97.9 °F (36.6 °C)  Heart Rate:  [] 100  Resp:  [18-22] 18  BP: (105-141)/(62-98) 113/95  Flow (L/min):  [3] 3     Physical Exam:  Constitutional: Elderly female, appears ill and in mild distress but nontoxic, hard of hearing  Respiratory: Decreased breath sounds bilaterally with wheezing in her upper airway, mildly increased respiratory effort while sleeping  Cardiovascular: RRR, no murmurs, rubs, or gallops  Gastrointestinal: Positive bowel sounds, soft, nontender, nondistended  Musculoskeletal: No bilateral ankle edema  Psychiatric: Appropriate affect, cooperative  Neurologic: Oriented x 3, no focal neurological deficits  Skin: Bilateral venous stasis changes with scabbing, no significant erythema      Results Reviewed:  LAB RESULTS:      Lab 22  0738 22  1559 22  0237 22  0544 22  0517 22  0016 22  1637 22  1250 22  0938 22  0602   WBC 8.36 6.66 9.84 11.94* 15.54*  --   --   --   --  15.98*   HEMOGLOBIN 12.8 12.4 12.5 12.0 11.7*  --   --   --   --  13.5   HEMATOCRIT 41.8 39.1 41.1 39.1 37.2  --   --   --   --  43.3   PLATELETS 189 160 149 125* 119*  --   --   --   --  137*   NEUTROS ABS 6.11 4.82 8.02* 10.16*  --   --   --   --   --  14.26*   IMMATURE GRANS (ABS) 0.07* 0.06* 0.04 0.05  --   --   --   --   --  0.16*   LYMPHS ABS 1.27 0.76 1.00 0.97  --   --   --   --   --  1.04   MONOS ABS 0.69 0.72  0.58 0.62  --   --   --   --   --  0.46   EOS ABS 0.18 0.26 0.16 0.10  --   --   --   --   --  0.02   MCV 86.4 85.6 87.4 86.5 85.9  --   --   --   --  85.7   CRP  --   --   --   --   --   --   --   --   --  13.77*   PROCALCITONIN  --   --  13.49*  --   --   --   --   --   --  4.82*   LACTATE  --   --  1.1  --   --  1.9 2.3* 3.4* 3.1* 2.9*   D DIMER QUANT  --   --  5.41* 4.62*  --   --   --   --   --   --          Lab 08/08/22  0738 08/07/22  1559 08/05/22  0237 08/04/22  0544 08/03/22  0517 08/02/22  0602   SODIUM 138 134* 133* 131* 137 133*   POTASSIUM 3.9 3.7 3.8 3.5 3.8 4.2   CHLORIDE 100 99 98 97* 98 92*   CO2 29.0 26.0 26.0 23.0 25.0 27.0   ANION GAP 9.0 9.0 9.0 11.0 14.0 14.0   BUN 46* 44* 51* 48* 47* 43*   CREATININE 1.27* 1.11* 1.49* 1.40* 1.58* 1.37*   EGFR 39.8* 46.7* 32.8* 35.4* 30.6* 36.3*   GLUCOSE 114* 103* 112* 122* 110* 250*   CALCIUM 8.1* 8.1* 7.7* 7.4* 7.2* 8.6   MAGNESIUM  --  2.3 2.2 2.2 1.6* 1.5*   PHOSPHORUS  --   --   --   --   --  2.9   HEMOGLOBIN A1C  --   --   --   --  9.00*  --    TSH  --   --   --   --   --  5.410*         Lab 08/05/22  0237 08/04/22  0544 08/02/22  0602   TOTAL PROTEIN 7.0 7.2 8.1   ALBUMIN 3.10* 2.90* 3.70   GLOBULIN 3.9 4.3 4.4   ALT (SGPT) 13 12 13   AST (SGOT) 22 25 30   BILIRUBIN 0.5 0.6 1.4*   ALK PHOS 79 136* 133*   LIPASE  --   --  17         Lab 08/05/22  0237 08/02/22  0602   PROBNP 16,610.0* 9,106.0*   TROPONIN T 0.045* 0.054*         Lab 08/03/22  0517   CHOLESTEROL 82   LDL CHOL 42   HDL CHOL 23*   TRIGLYCERIDES 82             Lab 08/05/22  0209 08/02/22  0704   PH, ARTERIAL 7.277* 7.420   PCO2, ARTERIAL 60.3* 43.8   PO2 ART 94.0 63.8*   FIO2 40 44   HCO3 ART 28.1* 28.4*   BASE EXCESS ART 0.2 3.3*   CARBOXYHEMOGLOBIN 1.0 1.4     Brief Urine Lab Results  (Last result in the past 365 days)      Color   Clarity   Blood   Leuk Est   Nitrite   Protein   CREAT   Urine HCG        08/02/22 0629 Yellow   Cloudy   Moderate (2+)   Moderate (2+)   Negative   >=300 mg/dL  (3+)                 Microbiology Results Abnormal     Procedure Component Value - Date/Time    COVID PRE-OP / PRE-PROCEDURE SCREENING ORDER (NO ISOLATION) - Swab, Nasopharynx [[id number]]  (Normal) Collected: 08/07/22 0940    Lab Status: Final result Specimen: Swab from Nasopharynx Updated: 08/07/22 1020    Narrative:      The following orders were created for panel order COVID PRE-OP / PRE-PROCEDURE SCREENING ORDER (NO ISOLATION) - Swab, Nasopharynx.  Procedure                               Abnormality         Status                     ---------                               -----------         ------                     COVID-19, ABBOTT IN-HOUS...[[id number]]  Normal              Final result                 Please view results for these tests on the individual orders.    COVID-19, ABBOTT IN-HOUSE,NASAL Swab (NO TRANSPORT MEDIA) 2 HR TAT - Swab, Nasopharynx [[id number]]  (Normal) Collected: 08/07/22 0940    Lab Status: Final result Specimen: Swab from Nasopharynx Updated: 08/07/22 1020     COVID19 Presumptive Negative    Narrative:      Fact sheet for providers: https://www.fda.gov/media/323025/download     Fact sheet for patients: https://www.fda.gov/media/355216/download    Test performed by PCR.  If inconsistent with clinical signs and symptoms patient should be tested with different authorized molecular test.    Gastrointestinal Panel, PCR - Stool, Per Rectum [[id number]]  (Normal) Collected: 08/04/22 1127    Lab Status: Final result Specimen: Stool from Per Rectum Updated: 08/04/22 1315     Campylobacter Not Detected     Plesiomonas shigelloides Not Detected     Salmonella Not Detected     Vibrio Not Detected     Vibrio cholerae Not Detected     Yersinia enterocolitica Not Detected     Enteroaggregative E. coli (EAEC) Not Detected     Enteropathogenic E. coli (EPEC) Not Detected     Enterotoxigenic E. coli (ETEC) lt/st Not Detected     Shiga-like toxin-producing E. coli (STEC) stx1/stx2 Not Detected      Shigella/Enteroinvasive E. coli (EIEC) Not Detected     Cryptosporidium Not Detected     Cyclospora cayetanensis Not Detected     Entamoeba histolytica Not Detected     Giardia lamblia Not Detected     Adenovirus F40/41 Not Detected     Astrovirus Not Detected     Norovirus GI/GII Not Detected     Rotavirus A Not Detected     Sapovirus (I, II, IV or V) Not Detected    COVID PRE-OP / PRE-PROCEDURE SCREENING ORDER (NO ISOLATION) - Swab, Nasopharynx [[id number]]  (Normal) Collected: 08/02/22 0607    Lab Status: Final result Specimen: Swab from Nasopharynx Updated: 08/02/22 0721    Narrative:      The following orders were created for panel order COVID PRE-OP / PRE-PROCEDURE SCREENING ORDER (NO ISOLATION) - Swab, Nasopharynx.  Procedure                               Abnormality         Status                     ---------                               -----------         ------                     Respiratory Panel PCR w/...[[id number]]  Normal              Final result                 Please view results for these tests on the individual orders.    Respiratory Panel PCR w/COVID-19(SARS-CoV-2) BRENTON/ELLA/HAILEE/PAD/COR/MAD/DEANN In-House, NP Swab in UTM/VTM, 3-4 HR TAT - Swab, Nasopharynx [[id number]]  (Normal) Collected: 08/02/22 0607    Lab Status: Final result Specimen: Swab from Nasopharynx Updated: 08/02/22 0721     ADENOVIRUS, PCR Not Detected     Coronavirus 229E Not Detected     Coronavirus HKU1 Not Detected     Coronavirus NL63 Not Detected     Coronavirus OC43 Not Detected     COVID19 Not Detected     Human Metapneumovirus Not Detected     Human Rhinovirus/Enterovirus Not Detected     Influenza A PCR Not Detected     Influenza B PCR Not Detected     Parainfluenza Virus 1 Not Detected     Parainfluenza Virus 2 Not Detected     Parainfluenza Virus 3 Not Detected     Parainfluenza Virus 4 Not Detected     RSV, PCR Not Detected     Bordetella pertussis pcr Not Detected     Bordetella parapertussis PCR Not Detected      Chlamydophila pneumoniae PCR Not Detected     Mycoplasma pneumo by PCR Not Detected    Narrative:      In the setting of a positive respiratory panel with a viral infection PLUS a negative procalcitonin without other underlying concern for bacterial infection, consider observing off antibiotics or discontinuation of antibiotics and continue supportive care. If the respiratory panel is positive for atypical bacterial infection (Bordetella pertussis, Chlamydophila pneumoniae, or Mycoplasma pneumoniae), consider antibiotic de-escalation to target atypical bacterial infection.          XR Chest 1 View    Result Date: 8/8/2022  DATE OF EXAM: 8/8/2022 9:00 AM  PROCEDURE: XR CHEST 1 VW-  INDICATIONS: chf; A41.9-Sepsis, unspecified organism; R65.20-Severe sepsis without septic shock; J96.01-Acute respiratory failure with hypoxia; I48.91-Unspecified atrial fibrillation; I50.33-Acute on chronic diastolic (congestive) heart failure  COMPARISON: 8/5/2022  TECHNIQUE: Single radiographic AP view of the chest was obtained.  FINDINGS: Heart is enlarged. Vasculature is cephalized. There is increasing hazy opacity of the right hemithorax, which may represent worsening pneumonia. As the current exam is semiupright, this could also represent a layered pleural effusion. Previous 8/2/2022 CT scan did show a moderate effusion. Left lung actually appears improved, with mild, decreasing left midlung airspace disease, and mild left basilar atelectasis or effusion. No pneumothorax is seen. Basilar discoid      Impression:  1. Increasing hazy opacity of the right hemithorax, whether worsening pneumonia, asymmetric edema, or layering of pleural effusion. 2. Mild remaining left midlung airspace disease which appears improved. 3. Persistent mild CHF  This report was finalized on 8/8/2022 10:42 AM by Dr. Rishi Whitehead MD.        Results for orders placed during the hospital encounter of 08/02/22    Adult Transthoracic Echo Complete W/ Cont if  Necessary Per Protocol    Interpretation Summary  · The right atrial cavity is moderate to severely dilated.  · There is a small left pleural effusion. There is a small right pleural effusion.  · Moderate to severe tricuspid valve regurgitation is present.  · Estimated right ventricular systolic pressure from tricuspid regurgitation is markedly elevated (>55 mmHg). Calculated right ventricular systolic pressure from tricuspid regurgitation is 68 mmHg.  · Estimated left ventricular EF = 55% Left ventricular ejection fraction appears to be 51 - 55%. Left ventricular systolic function is normal.  · Moderate to severe pulmonary hypertension is present.  · Systolic and diastolic flattening of the interventricular septum consistent with right ventricle pressure and volume overload.  · The right ventricular cavity is moderately dilated with normal right ventricular wall thickness and severely reduced right ventricular systolic function; abnormal septal motion.  · There is no evidence of pericardial effusion.      I have reviewed the medications:  Scheduled Meds:castor oil-balsam peru, 1 application, Topical, Q12H  cefuroxime, 250 mg, Oral, Q12H  cetirizine, 10 mg, Oral, Daily  insulin detemir, 10 Units, Subcutaneous, Nightly  insulin lispro, 0-7 Units, Subcutaneous, TID AC  lactobacillus acidophilus, 1 capsule, Oral, Daily  levothyroxine, 88 mcg, Oral, Q AM  melatonin, 10 mg, Oral, Nightly  metoprolol tartrate, 25 mg, Oral, BID  montelukast, 10 mg, Oral, Nightly  nystatin, 1 application, Topical, BID  rivaroxaban, 15 mg, Oral, Daily With Dinner  sodium chloride, 10 mL, Intravenous, Q12H  traZODone, 50 mg, Oral, Nightly      Continuous Infusions:dilTIAZem, 5-15 mg/hr, Last Rate: Stopped (08/02/22 2145)      PRN Meds:.•  acetaminophen  •  dextrose  •  dextrose  •  glucagon (human recombinant)  •  HYDROcodone-acetaminophen  •  ipratropium-albuterol  •  ondansetron **OR** ondansetron  •  sodium chloride  •  sodium  chloride    Assessment & Plan   Assessment & Plan     Active Hospital Problems    Diagnosis  POA   • **Sepsis due to group B Streptococcus (HCC) [A40.1]  Yes   • Demand ischemia (HCC) [I24.8]  Yes   • Coronary artery disease involving native coronary artery of native heart with angina pectoris (HCC) [I25.119]  Yes   • Permanent atrial fibrillation (HCC) [I48.21]  Yes   • Hypomagnesemia [E83.42]  Yes   • Hypothyroidism [E03.9]  Yes   • CKD (chronic kidney disease) stage 3, GFR 30-59 ml/min (HCC) [N18.30]  Yes   • Pulmonary hypertension (HCC) [I27.20]  Yes   • T2DM (type 2 diabetes mellitus) (HCC) [E11.9]  Yes      Resolved Hospital Problems    Diagnosis Date Resolved POA   • Sepsis with acute hypoxic respiratory failure without septic shock, due to unspecified organism (HCC) [A41.9, R65.20, J96.01] 08/05/2022 Yes   • Elevated serum creatinine [R79.89] 08/05/2022 Yes   • Thrombocytopenia (HCC) [D69.6] 08/05/2022 Yes   • Lactic acidosis [E87.2] 08/05/2022 Yes   • HTN (hypertension) [I10] 08/05/2022 Yes   • HLD (hyperlipidemia) [E78.5] 08/05/2022 Unknown   • Pulmonary hypertension (HCC) [I27.20] 08/05/2022 Yes   • Mitral valve regurgitation [I34.0] 08/05/2022 Yes        Brief Hospital Course to date:  Rosanna Browne is a 89 y.o. female with PMH of HTN, HLD, T2DM on insulin, Hypothyroidism, Severe Pulmonary HTN, MVR, HFpEF, Afib on Xarelto and CKD II presented from [facility] with severe sepsis. Pt also noted to be hypoxic and in Afib with RVR. Febrile with leukocytosis, elevated lactate, elevated procal on presentation. CXr unremarkable. CT chest without contrast unremakable. RVP (including COVID19) negative. GI PCR negative     Severe Sepsis, resolved  Group B Strep Bacteremia  RLE cellulitis, improving  -suspect RLE cellulitis vs urinary source given UA with moderate LE, 3-5 WBCs and trace bacteria   -TTE with no evidence of endocarditis.   -blood Cx with GBS, appears it is in all four bottles sent  from admission   -Urine culture growing Proteus, susceptible to cefazolin  -ID consulted.  Patient to continue cefazolin until 8/8 then changed to cefuroxime to 50 mg twice daily until 8/15.     Hypoxia  H/o Severe Pulmonary HTN  Pneumonia  H/o HFpEF  -- pt with noted hypoxia on RA at NH.  Wears supplemental O2 continuously at baseline but unsure of how many liters. Currently stable on 3L  -- CT chest unremarkable  -- s/p IV Lasix in the ED. S/p albumin and Bumex per cards on 8/6  -- TTE with moderate to severe Pulm HTN, right ventricular dysfunction, normal LVEF.   - Additional IV diuresis today and PO starting tmrw. D/w cardiology, poor prognosis overall, palliative consulted. Daughter considering hospice at discharge. Plan for f/u w palliative tmrw AM     Lactic acidosis  - noted on presentation, resolved.      Afib with RVR  -- [id number] of 5, on Xarelto at home- dose adjusted based on renal function  -- continue Metoprolol succinate as BP tolerates     T2DM  -- hold home meds  -- HbA1C 9% c/w poor control at home  -- SSI with low dose basal insulin for now  -- Glucoses well controlled     HTN  -- Normotensive  -- Continue metoprolol     HLD  -- LDL at goal      Elevated creatinine on CKD II  -- unsure of baseline Cr.  Suspect her baseline may be 1.4-1.6 based off of measurements here  -- monitor/renally dose meds  - following BMP     Hypomagnesemia  -- replace per protocol     TCP, resolved  -- mild, now resolved. Likely due to sepsis     Hypothyroidism  -- TSH elevated  --  home dose Synthroid increased from 75 mcg to 88mcg.  Needs repeat TSH in 4-6 weeks           Expected Discharge Location and Transportation: TBD, likely back to NH w hospice  Expected Discharge Date: 8/8/22-8/9    DVT prophylaxis:  Medical DVT prophylaxis orders are present.     AM-PAC 6 Clicks Score (PT): 10 (08/07/22 0800)    CODE STATUS:   Code Status and Medical Interventions:   Ordered at: 08/02/22 1206     Medical Intervention  Limits:    NO intubation (DNI)    NO cardioversion    NO dialysis    NO vasopressors    NO artificial nutrition     Level Of Support Discussed With:    Patient     Code Status (Patient has no pulse and is not breathing):    No CPR (Do Not Attempt to Resuscitate)     Medical Interventions (Patient has pulse or is breathing):    Limited Support     I have prepared this progress note with copied portions of the prior day's progress note of my own authorship to preserve accuracy and maintain consistency of documentation. I have reviewed these portions and edited them for correctness. I verify that the above documentation accurately and truly represents the evaluation and management performed on today's date.       Danielle Carbone MD  08/08/22

## 2022-08-08 NOTE — NURSING NOTE
"Reason for Wound, Ostomy and Continence (WOC) Nursing Consult: \"Follow-up to philtrum and bottom\"    Patient sleeping but woken up, not very talkative.  Family/support person not present.      Philtrum pink blanchable improved    1.Wound Assessment  Wound Type: MASD (Moisture associated skin damage) - Yeast Dermatitis   Perianal and groin, bright pink blanchable denuded with scattered dots patient found to be incontinent of stool  Care provided: The wound was cleansed with foam cleanser and blue wipes, stoma powder and nystatin powder applied and crusted with barrier spray x2 and then Z guard applied.  Wound Image:           Recommendation(s) for management of wound:   -Refer to wound care orders for specific instructions on how to treat/manage wound.    Most recent Ezekiel Scale score:  Sensory Perception: 3-->slightly limited  Moisture: 3-->occasionally moist  Activity: 2-->chairfast  Mobility: 2-->very limited  Nutrition: 3-->adequate  Friction and Shear: 2-->potential problem  Ezekiel Score: 15 (08/07/22 2000)     Specialty support surface: Low Air Loss (CONCEPCIÓN) Mattress       Pressure Injury Prevention Protocol (initiate for Ezekiel Score of 18 or less):   *Keep skin dry, turn q 2 hr, keep heels elevated and offloaded with offloading heel boots.    *Apply z-guard to bottom and bony prominences daily and as needed with incontinence episodes.  *Follow C.A.R.E protocol if medical devices (Bipap, ledezma, Ng tube, etc) are being used.     All skin interventions in place.  Head to toe assessment completed. Heels and bottom blanchable, intact and offloaded.  Discussed plan of care with RN.    Thank you for consulting the WOC Nurse.  WOC Team will sign off.  Please re consult if the wound(s) worsens.             "

## 2022-08-08 NOTE — PROGRESS NOTES
"Cardiology Progress Note      Reason for visit:    · Shortness of breath  · Atrial fibrillation with RVR    IDENTIFICATION: 89-year-old female who resides in [place], [place] at [facility] Problems    Diagnosis  POA   • **Sepsis due to group B Streptococcus ([facility]) [A40.1]  Yes     Priority: High   • Demand ischemia ([facility]) [I24.8]  Yes     Priority: High   • Permanent atrial fibrillation ([facility]) [I48.21]  Yes     Priority: High     · [id number] 6 (age >75, female, HTN, athero, DM)     • CKD (chronic kidney disease) stage 3, GFR 30-59 ml/min ([facility]) [N18.30]  Yes     Priority: High   • Pulmonary hypertension ([facility]) [I27.20]  Yes     Priority: High     · Echo (8/3/2022): LVEF 55%.  RV pressure/volume overload.  Severe TR.  RVSP 68 mmHg.     • T2DM (type 2 diabetes mellitus) ([facility]) [E11.9]  Yes     Priority: High   • Coronary artery disease involving native coronary artery of native heart with angina pectoris ([facility]) [I25.119]  Yes     Priority: Medium     · Coronary artery calcification noted on CT chest, 8/2/2022     • Hypomagnesemia [E83.42]  Yes     Priority: Low   • Hypothyroidism [E03.9]  Yes     Priority: Low          Patient is lying in the bed on O2 2 L via nasal cannula.  She appears tachypneic.  She is not verbal and is sleeping but arouses fairly easily.    She did receive IV Bumex yesterday but has not had a diuretic today.  The patient will shake her head in response to my questions but does not appear to want to open her eyes.  According to the nurse she is verbal when she \"wants to be\"           Vital Sign Min/Max for last 24 hours  Temp  Min: 96.8 °F (36 °C)  Max: 98.4 °F (36.9 °C)   BP  Min: 109/63  Max: 141/98   Pulse  Min: 86  Max: 147   Resp  Min: 16  Max: 22   SpO2  Min: 92 %  Max: 97 %   Flow (L/min)  Min: 3  Max: 3      Intake/Output Summary (Last 24 hours) at 8/8/2022 0927  Last data filed at 8/7/2022 2011  Gross per 24 hour   Intake --   Output 550 ml   Net -550 ml    "        Physical Exam  Cardiovascular:      Rate and Rhythm: Tachycardia present. Rhythm irregularly irregular.   Musculoskeletal:      Right lower le+ Pitting Edema present.      Left lower le+ Pitting Edema present.   Neurological:      Mental Status: She is lethargic.   Psychiatric:         Behavior: Behavior is uncooperative.         Tele: A. fib    Results Review (reviewed the patient's recent labs in the electronic medical record):     EKG (2022): Rate controlled atrial fibrillation    CXR (2022): Patchy opacities in perihilar right lung suspicious for pneumonia.  Trace pleural effusions.  Cardiomegaly    ECHO (8/3/2022): LVEF 55%.  RA moderate to severely dilated.  Small left and right pleural effusions.  Moderate to severe TR with RVSP 68 mmHg.  Moderate to severe pulmonary hypertension.  RV volume overload    Results from last 7 days   Lab Units 22  0738 22  1559 22  0237 22  0544 22  0517 22  0602   SODIUM mmol/L 138 134* 133* 131* 137 133*   POTASSIUM mmol/L 3.9 3.7 3.8 3.5 3.8 4.2   CHLORIDE mmol/L 100 99 98 97* 98 92*   BUN mg/dL 46* 44* 51* 48* 47* 43*   CREATININE mg/dL 1.27* 1.11* 1.49* 1.40* 1.58* 1.37*   MAGNESIUM mg/dL  --  2.3 2.2 2.2 1.6* 1.5*     Results from last 7 days   Lab Units 22  0237 22  0602   TROPONIN T ng/mL 0.045* 0.054*     Results from last 7 days   Lab Units 22  0738 22  1559 22  0237   WBC 10*3/mm3 8.36 6.66 9.84   HEMOGLOBIN g/dL 12.8 12.4 12.5   HEMATOCRIT % 41.8 39.1 41.1   PLATELETS 10*3/mm3 189 160 149       Lab Results   Component Value Date    HGBA1C 9.00 (H) 2022       Lab Results   Component Value Date    CHOL 82 2022    TRIG 82 2022    HDL 23 (L) 2022    LDL 42 2022            Sepsis from group B streptococcus/right leg cellulitis/pneumonia/UTI  · Infectious disease following.  Suspect right leg is the source.  · Echo showed no vegetations making endocarditis  less likely    Acute on chronic right-sided heart failure/severe pulmonary hypertension  · Echo normal LVEF but RV overload from severe pulmonary hypertension  · Bumex 2 mg IV x1       Elevated troponin/demand ischemia  · Troponins elevated but flat in the setting of acute right-sided heart failure and atrial fibrillation with RVR which is consistent with demand ischemia      Atrial fibrillation with RVR/permanent atrial fibrillation  · Likely permanent atrial fibrillation.  · Currently rate controlled.  Anticoagulated with Xarelto 15 mg daily  · Metoprolol tartrate 25 mg twice daily    Coronary artery disease  · Coronary calcification noted on CT of the chest 8/2/2022  · For aspirin due to chronic anticoagulation with Xarelto      Type 2 diabetes mellitus  · Hemoglobin A1c 9    Chronic kidney disease  · Current creatinine 1.27    Hypertension  · Metoprolol tartrate 25 mg twice daily  · Current /65    Hyperlipidemia  · Total cholesterol 82, LDL 42, triglycerides 82  · Defer statin therapy due to lack of clinical efficacy in this age group         · Give 2 mg IV Bumex x1 today and start Bumex 2 mg p.o. tomorrow  · Recommend goals of care be discussed with patient's daughter who is POA.  Would recommend palliative/hospice be involved.  Patient is currently DNR/DNI  · Rate control will be difficult given underlying multiple infections.  Continue metoprolol tartrate and Xarelto  · Follow-up with primary cardiologist Dr. Unruly Lemons in 3 weeks after discharge  · Please call cardiology with any further questions    Electronically signed by MONICA Esteves, 08/08/22, 9:16 AM EDT.

## 2022-08-09 VITALS
DIASTOLIC BLOOD PRESSURE: 61 MMHG | OXYGEN SATURATION: 92 % | HEART RATE: 106 BPM | RESPIRATION RATE: 18 BRPM | HEIGHT: 66 IN | BODY MASS INDEX: 32.54 KG/M2 | TEMPERATURE: 98.5 F | WEIGHT: 202.5 LBS | SYSTOLIC BLOOD PRESSURE: 120 MMHG

## 2022-08-09 PROBLEM — A40.1 SEPSIS DUE TO GROUP B STREPTOCOCCUS: Status: RESOLVED | Noted: 2022-08-03 | Resolved: 2022-08-09

## 2022-08-09 PROBLEM — E83.42 HYPOMAGNESEMIA: Status: RESOLVED | Noted: 2022-08-02 | Resolved: 2022-08-09

## 2022-08-09 LAB
GLUCOSE BLDC GLUCOMTR-MCNC: 131 MG/DL (ref 70–130)
GLUCOSE BLDC GLUCOMTR-MCNC: 153 MG/DL (ref 70–130)

## 2022-08-09 PROCEDURE — 63710000001 INSULIN LISPRO (HUMAN) PER 5 UNITS: Performed by: INTERNAL MEDICINE

## 2022-08-09 PROCEDURE — 82962 GLUCOSE BLOOD TEST: CPT

## 2022-08-09 PROCEDURE — 99239 HOSP IP/OBS DSCHRG MGMT >30: CPT | Performed by: STUDENT IN AN ORGANIZED HEALTH CARE EDUCATION/TRAINING PROGRAM

## 2022-08-09 RX ORDER — CASTOR OIL AND BALSAM, PERU 788; 87 MG/G; MG/G
1 OINTMENT TOPICAL EVERY 12 HOURS SCHEDULED
Qty: 56.7 G | Refills: 0 | Status: SHIPPED | OUTPATIENT
Start: 2022-08-09

## 2022-08-09 RX ORDER — CEFUROXIME AXETIL 250 MG/1
250 TABLET ORAL EVERY 12 HOURS SCHEDULED
Qty: 15 TABLET | Refills: 0 | Status: SHIPPED | OUTPATIENT
Start: 2022-08-09 | End: 2022-08-17

## 2022-08-09 RX ORDER — LEVOTHYROXINE SODIUM 88 UG/1
88 TABLET ORAL
Qty: 90 TABLET | Refills: 0 | Status: SHIPPED | OUTPATIENT
Start: 2022-08-10

## 2022-08-09 RX ADMIN — Medication 10 ML: at 08:42

## 2022-08-09 RX ADMIN — LEVOTHYROXINE SODIUM 88 MCG: 88 TABLET ORAL at 06:25

## 2022-08-09 RX ADMIN — NYSTATIN 1 APPLICATION: 100000 POWDER TOPICAL at 08:41

## 2022-08-09 RX ADMIN — INSULIN LISPRO 2 UNITS: 100 INJECTION, SOLUTION INTRAVENOUS; SUBCUTANEOUS at 08:41

## 2022-08-09 RX ADMIN — ACETAMINOPHEN 325MG 650 MG: 325 TABLET ORAL at 12:41

## 2022-08-09 RX ADMIN — CASTOR OIL AND BALSAM, PERU 1 APPLICATION: 788; 87 OINTMENT TOPICAL at 08:41

## 2022-08-09 RX ADMIN — CETIRIZINE HYDROCHLORIDE TABLETS 10 MG: 10 TABLET, FILM COATED ORAL at 08:42

## 2022-08-09 RX ADMIN — Medication 1 CAPSULE: at 08:42

## 2022-08-09 RX ADMIN — CEFUROXIME AXETIL 250 MG: 250 TABLET, FILM COATED ORAL at 08:42

## 2022-08-09 RX ADMIN — METOPROLOL TARTRATE 25 MG: 25 TABLET, FILM COATED ORAL at 08:42

## 2022-08-09 NOTE — PROGRESS NOTES
Continued Stay Note  [facility]     Patient Name: Rosanna Browne  MRN: [id number]  Today's Date: 8/9/2022    Admit Date: 8/2/2022     Discharge Plan     Row Name 08/09/22 1101       Plan    Plan To [facility] with [facility] hosp[facility]    Final Discharge Disposition Code 50 - home with hospice    Final Note Hospice referral received; chart reviewed.  Call made to pt daughter, Charissa, at [phone].  Charissa was not available to have a long discussion and requested liaison contact her sister, Chelsea, at [phone].  Call made to Chelsea.  Hospice plan of care and goals of care discussed.  Questions and concerns addressed.  Overview of hospice services provided.  Pt to dc to [facility] rm. [facility] via ambulance today at 13:45. Pt currently uses O2 provided by facility.  Hospice RN to provide poise pads if able.  Any new Rx to be sent with pt to facility.  Hospice 24h number given to pt daughter who verbalized understanding to call once pt arrives home to be admitted to hospice services.  Team, RN and CM updated.  Will send DC summary to hospice intake when available.    Row Name 08/09/22 1053       Plan    Plan [facility]    Final Discharge Disposition Code 04 - [facility] care facility    Final Note Mrs. Browne is scheduled to return to her longterm care medicaid bed at [facility] today at 13:45 via ambulance. I confirmed with her daughter Charissa that Mrs. Browne will be followed by hospice services there. I updated Edgar in admissions. He verbalizes understanding and states that they can accomodate this. Nurse to call report to [phone] ext [phone].    Row Name 08/09/22 0933       Plan    Final Discharge Disposition Code 04 - intermediate care facility               Discharge Codes    No documentation.               Expected Discharge Date and Time     Expected Discharge Date Expected Discharge Time    Aug 9, 2022  1:45 PM            Ailin Samuels RN

## 2022-08-09 NOTE — DISCHARGE SUMMARY
[facility] Medicine Services  DISCHARGE SUMMARY    Patient Name: Rosanna Browne  : 1930  MRN: [id number]    Date of Admission: 2022  5:47 AM  Date of Discharge:  2022  Primary Care Physician: Red Perez MD    Consults     Date and Time Order Name Status Description    2022  2:51 PM Inpatient Palliative Care MD Consult Completed     2022  4:31 AM Inpatient Cardiology Consult Completed     8/3/2022 12:34 AM Inpatient Infectious Diseases Consult Completed           Hospital Course     Presenting Problem:   Sepsis with acute hypoxic respiratory failure without septic shock, due to unspecified organism (HCC) [A41.9, R65.20, J96.01]    Active Hospital Problems    Diagnosis  POA   • **Sepsis due to group B Streptococcus (HCC) [A40.1]  Yes   • Demand ischemia (HCC) [I24.8]  Yes   • Coronary artery disease involving native coronary artery of native heart with angina pectoris (HCC) [I25.119]  Yes   • Permanent atrial fibrillation (HCC) [I48.21]  Yes   • Hypomagnesemia [E83.42]  Yes   • Hypothyroidism [E03.9]  Yes   • CKD (chronic kidney disease) stage 3, GFR 30-59 ml/min (HCC) [N18.30]  Yes   • Pulmonary hypertension (HCC) [I27.20]  Yes   • T2DM (type 2 diabetes mellitus) (HCC) [E11.9]  Yes      Resolved Hospital Problems    Diagnosis Date Resolved POA   • Sepsis with acute hypoxic respiratory failure without septic shock, due to unspecified organism (HCC) [A41.9, R65.20, J96.01] 2022 Yes   • Elevated serum creatinine [R79.89] 2022 Yes   • Thrombocytopenia (HCC) [D69.6] 2022 Yes   • Lactic acidosis [E87.2] 2022 Yes   • HTN (hypertension) [I10] 2022 Yes   • HLD (hyperlipidemia) [E78.5] 2022 Unknown   • Pulmonary hypertension (HCC) [I27.20] 2022 Yes   • Mitral valve regurgitation [I34.0] 2022 Yes          Hospital Course:  Rosanna Browne is a 89 y.o. female Rosanna NAMRATA Browne is a 89 y.o. female with PMH of HTN,  HLD, T2DM on insulin, Hypothyroidism, Severe Pulmonary HTN, MVR, HFpEF, Afib on Xarelto and CKD II presented from [facility] with severe sepsis. Pt also noted to be hypoxic and in Afib with RVR. Febrile with leukocytosis, elevated lactate, elevated procal on presentation. CXr unremarkable. CT chest without contrast unremakable. RVP (including COVID19) negative. GI PCR negative    Severe sepsis source suspected secondary to RLE cellulitis vs urinary source. She was found to have group B strep bacteremia. Urine Cx grew proteus, susceptible to cefazoline. TTE performed showed no evidence of endocarditis, ID was consulted and recommended continuing cefazolin until 8/8 and then changing to cefuroxime to 250mg BID until 8/15. Probiotic while on abx    She was found to be hypoxic on room air at her living facility. She wears O2 continuously at baseline but unclear how much. CT chest performed showed bibasilar effusions but no significant acute pulmonary process. TTE performed showed moderate to severe pulmonary hypertension, R ventricular dysfunction and a normal LVEF. Cardiology was consulted and placed recommendations regarding diuresis. She received IV diuresis and eventually transitioned to PO.    Unfortunately despite diuresis and further full support measures, the patient continued to be uncomfortable and palliative was consulted to assist in GOC conversations as it was concluded that given her underlying severe pulmonary hypertension she is at her new baseline.Family has elected for hospice services at discharge    Afib with RVR  -- [id number] of 5, on Xarelto at home- dose adjusted based on renal function  -- continue Metoprolol succinate as BP tolerates     T2DM  -- can continue home meds     HTN  -- Normotensive  -- Continue metoprolol     HLD  -- LDL at goal      Elevated creatinine on CKD II  -- unsure of baseline Cr.  Suspect her baseline may be 1.4-1.6 based off of measurements here  --  monitor/renally dose meds  - followed BMP     Hypomagnesemia  -- replace per protocol     TCP, resolved  -- mild, now resolved. Likely due to sepsis     Hypothyroidism  -- TSH elevated  --  home dose Synthroid increased from 75 mcg to 88mcg.  Needs repeat TSH in 4-6 weeks    Discharge Follow Up Recommendations for outpatient labs/diagnostics:  Hospice      Day of Discharge     HPI:   No new issues overnight, no increased O2 requirements    Review of Systems  Unable to assess secondary to mental status    Vital Signs:   Temp:  [97.1 °F (36.2 °C)-98.5 °F (36.9 °C)] 98.5 °F (36.9 °C)  Heart Rate:  [] 106  Resp:  [18-20] 18  BP: (105-129)/(58-95) 120/61  Flow (L/min):  [3] 3      Physical Exam:  Constitutional: No acute distress, chronically I'll and fatigued appearing not interactive and keeps eyes closed  HENT: NCAT, mucous membranes moist  Respiratory: decreased  to auscultation bilaterally, respiratory effort nl on NC  Cardiovascular: RRR, no murmurs, rubs, or gallops  Gastrointestinal: Positive bowel sounds, soft, nontender, nondistended  Musculoskeletal: BLE 1+ edwma  Psychiatric: Appropriate affect, cooperative  Neurologic: drowsy, follows only some commanda  Skin: BLE venous stasis      Pertinent  and/or Most Recent Results     LAB RESULTS:      Lab 08/08/22  0738 08/07/22  1559 08/05/22  0237 08/04/22  0544 08/03/22  0517 08/03/22  0016 08/02/22  1637 08/02/22  1250 08/02/22  0938   WBC 8.36 6.66 9.84 11.94* 15.54*  --   --   --   --    HEMOGLOBIN 12.8 12.4 12.5 12.0 11.7*  --   --   --   --    HEMATOCRIT 41.8 39.1 41.1 39.1 37.2  --   --   --   --    PLATELETS 189 160 149 125* 119*  --   --   --   --    NEUTROS ABS 6.11 4.82 8.02* 10.16*  --   --   --   --   --    IMMATURE GRANS (ABS) 0.07* 0.06* 0.04 0.05  --   --   --   --   --    LYMPHS ABS 1.27 0.76 1.00 0.97  --   --   --   --   --    MONOS ABS 0.69 0.72 0.58 0.62  --   --   --   --   --    EOS ABS 0.18 0.26 0.16 0.10  --   --   --   --   --    MCV  86.4 85.6 87.4 86.5 85.9  --   --   --   --    PROCALCITONIN  --   --  13.49*  --   --   --   --   --   --    LACTATE  --   --  1.1  --   --  1.9 2.3* 3.4* 3.1*   D DIMER QUANT  --   --  5.41* 4.62*  --   --   --   --   --          Lab 08/08/22  0738 08/07/22  1559 08/05/22  0237 08/04/22  0544 08/03/22  0517   SODIUM 138 134* 133* 131* 137   POTASSIUM 3.9 3.7 3.8 3.5 3.8   CHLORIDE 100 99 98 97* 98   CO2 29.0 26.0 26.0 23.0 25.0   ANION GAP 9.0 9.0 9.0 11.0 14.0   BUN 46* 44* 51* 48* 47*   CREATININE 1.27* 1.11* 1.49* 1.40* 1.58*   EGFR 39.8* 46.7* 32.8* 35.4* 30.6*   GLUCOSE 114* 103* 112* 122* 110*   CALCIUM 8.1* 8.1* 7.7* 7.4* 7.2*   MAGNESIUM  --  2.3 2.2 2.2 1.6*   HEMOGLOBIN A1C  --   --   --   --  9.00*         Lab 08/05/22  0237 08/04/22  0544   TOTAL PROTEIN 7.0 7.2   ALBUMIN 3.10* 2.90*   GLOBULIN 3.9 4.3   ALT (SGPT) 13 12   AST (SGOT) 22 25   BILIRUBIN 0.5 0.6   ALK PHOS 79 136*         Lab 08/05/22  0237   PROBNP 16,610.0*   TROPONIN T 0.045*         Lab 08/03/22  0517   CHOLESTEROL 82   LDL CHOL 42   HDL CHOL 23*   TRIGLYCERIDES 82             Lab 08/05/22  0209   PH, ARTERIAL 7.277*   PCO2, ARTERIAL 60.3*   PO2 ART 94.0   FIO2 40   HCO3 ART 28.1*   BASE EXCESS ART 0.2   CARBOXYHEMOGLOBIN 1.0     Brief Urine Lab Results  (Last result in the past 365 days)      Color   Clarity   Blood   Leuk Est   Nitrite   Protein   CREAT   Urine HCG        08/02/22 0629 Yellow   Cloudy   Moderate (2+)   Moderate (2+)   Negative   >=300 mg/dL (3+)               Microbiology Results (last 10 days)     Procedure Component Value - Date/Time    COVID PRE-OP / PRE-PROCEDURE SCREENING ORDER (NO ISOLATION) - Swab, Nasopharynx [[id number]]  (Normal) Collected: 08/07/22 0940    Lab Status: Final result Specimen: Swab from Nasopharynx Updated: 08/07/22 1020    Narrative:      The following orders were created for panel order COVID PRE-OP / PRE-PROCEDURE SCREENING ORDER (NO ISOLATION) - Swab, Nasopharynx.  Procedure                                Abnormality         Status                     ---------                               -----------         ------                     COVID-19, ABBOTT IN-HOUS...[[id number]]  Normal              Final result                 Please view results for these tests on the individual orders.    COVID-19, ABBOTT IN-HOUSE,NASAL Swab (NO TRANSPORT MEDIA) 2 HR TAT - Swab, Nasopharynx [[id number]]  (Normal) Collected: 08/07/22 0940    Lab Status: Final result Specimen: Swab from Nasopharynx Updated: 08/07/22 1020     COVID19 Presumptive Negative    Narrative:      Fact sheet for providers: https://www.fda.gov/media/458681/download     Fact sheet for patients: https://www.fda.gov/media/375502/download    Test performed by PCR.  If inconsistent with clinical signs and symptoms patient should be tested with different authorized molecular test.    Gastrointestinal Panel, PCR - Stool, Per Rectum [[id number]]  (Normal) Collected: 08/04/22 1127    Lab Status: Final result Specimen: Stool from Per Rectum Updated: 08/04/22 1315     Campylobacter Not Detected     Plesiomonas shigelloides Not Detected     Salmonella Not Detected     Vibrio Not Detected     Vibrio cholerae Not Detected     Yersinia enterocolitica Not Detected     Enteroaggregative E. coli (EAEC) Not Detected     Enteropathogenic E. coli (EPEC) Not Detected     Enterotoxigenic E. coli (ETEC) lt/st Not Detected     Shiga-like toxin-producing E. coli (STEC) stx1/stx2 Not Detected     Shigella/Enteroinvasive E. coli (EIEC) Not Detected     Cryptosporidium Not Detected     Cyclospora cayetanensis Not Detected     Entamoeba histolytica Not Detected     Giardia lamblia Not Detected     Adenovirus F40/41 Not Detected     Astrovirus Not Detected     Norovirus GI/GII Not Detected     Rotavirus A Not Detected     Sapovirus (I, II, IV or V) Not Detected    Blood Culture - Blood, Arm, Right [[id number]]  (Abnormal)  (Susceptibility) Collected: 08/02/22 0640    Lab Status:  Final result Specimen: Blood from Arm, Right Updated: 08/04/22 0642     Blood Culture Streptococcus agalactiae (Group B)     Isolated from Aerobic and Anaerobic Bottles     Gram Stain Anaerobic Bottle Gram positive cocci in pairs and chains      Aerobic Bottle Gram positive cocci in pairs and chains    Susceptibility      Streptococcus agalactiae (Group B)      SUNITHA      Ceftriaxone Susceptible      Levofloxacin Susceptible      Penicillin G Susceptible      Vancomycin Susceptible                           Blood Culture ID, PCR - Blood, Arm, Right [[id number]]  (Abnormal) Collected: 08/02/22 0640    Lab Status: Final result Specimen: Blood from Arm, Right Updated: 08/02/22 2021     BCID, PCR Streptococcus agalactiae (Group B). Identification by BCID2 PCR.    Blood Culture - Blood, Arm, Left [[id number]]  (Abnormal) Collected: 08/02/22 0630    Lab Status: Final result Specimen: Blood from Arm, Left Updated: 08/04/22 0643     Blood Culture Streptococcus agalactiae (Group B)     Isolated from Aerobic and Anaerobic Bottles     Gram Stain Anaerobic Bottle Gram positive cocci in pairs and chains      Aerobic Bottle Gram positive cocci in pairs and chains    Narrative:      Refer to previous blood culture collected on  8/2/2022 at 0640 for MICs.    Urine Culture - Urine, Urine, Catheter [[id number]]  (Abnormal)  (Susceptibility) Collected: 08/02/22 0629    Lab Status: Final result Specimen: Urine, Catheter Updated: 08/05/22 0932     Urine Culture >100,000 CFU/mL Proteus mirabilis    Narrative:      Colonization of the urinary tract without infection is common. Treatment is discouraged unless the patient is symptomatic, pregnant, or undergoing an invasive urologic procedure.    Susceptibility      Proteus mirabilis      SUNITHA      Amikacin Susceptible      Ampicillin Resistant     Ampicillin + Sulbactam Resistant     Cefazolin Susceptible      Cefepime Susceptible      Ceftazidime Susceptible      Ceftriaxone Susceptible       Gentamicin Resistant     Levofloxacin Resistant     Nitrofurantoin Resistant     Piperacillin + Tazobactam Susceptible      Tobramycin Resistant     Trimethoprim + Sulfamethoxazole Resistant                          COVID PRE-OP / PRE-PROCEDURE SCREENING ORDER (NO ISOLATION) - Swab, Nasopharynx [[id number]]  (Normal) Collected: 08/02/22 0607    Lab Status: Final result Specimen: Swab from Nasopharynx Updated: 08/02/22 0721    Narrative:      The following orders were created for panel order COVID PRE-OP / PRE-PROCEDURE SCREENING ORDER (NO ISOLATION) - Swab, Nasopharynx.  Procedure                               Abnormality         Status                     ---------                               -----------         ------                     Respiratory Panel PCR w/...[[id number]]  Normal              Final result                 Please view results for these tests on the individual orders.    Respiratory Panel PCR w/COVID-19(SARS-CoV-2) BRENTON/ELLA/HAILEE/PAD/COR/MAD/DEANN In-House, NP Swab in UTM/VTM, 3-4 HR TAT - Swab, Nasopharynx [[id number]]  (Normal) Collected: 08/02/22 0607    Lab Status: Final result Specimen: Swab from Nasopharynx Updated: 08/02/22 0721     ADENOVIRUS, PCR Not Detected     Coronavirus 229E Not Detected     Coronavirus HKU1 Not Detected     Coronavirus NL63 Not Detected     Coronavirus OC43 Not Detected     COVID19 Not Detected     Human Metapneumovirus Not Detected     Human Rhinovirus/Enterovirus Not Detected     Influenza A PCR Not Detected     Influenza B PCR Not Detected     Parainfluenza Virus 1 Not Detected     Parainfluenza Virus 2 Not Detected     Parainfluenza Virus 3 Not Detected     Parainfluenza Virus 4 Not Detected     RSV, PCR Not Detected     Bordetella pertussis pcr Not Detected     Bordetella parapertussis PCR Not Detected     Chlamydophila pneumoniae PCR Not Detected     Mycoplasma pneumo by PCR Not Detected    Narrative:      In the setting of a positive respiratory panel with a  viral infection PLUS a negative procalcitonin without other underlying concern for bacterial infection, consider observing off antibiotics or discontinuation of antibiotics and continue supportive care. If the respiratory panel is positive for atypical bacterial infection (Bordetella pertussis, Chlamydophila pneumoniae, or Mycoplasma pneumoniae), consider antibiotic de-escalation to target atypical bacterial infection.          Adult Transthoracic Echo Complete W/ Cont if Necessary Per Protocol    Result Date: 8/3/2022  · The right atrial cavity is moderate to severely dilated. · There is a small left pleural effusion. There is a small right pleural effusion. · Moderate to severe tricuspid valve regurgitation is present. · Estimated right ventricular systolic pressure from tricuspid regurgitation is markedly elevated (>55 mmHg). Calculated right ventricular systolic pressure from tricuspid regurgitation is 68 mmHg. · Estimated left ventricular EF = 55% Left ventricular ejection fraction appears to be 51 - 55%. Left ventricular systolic function is normal. · Moderate to severe pulmonary hypertension is present. · Systolic and diastolic flattening of the interventricular septum consistent with right ventricle pressure and volume overload. · The right ventricular cavity is moderately dilated with normal right ventricular wall thickness and severely reduced right ventricular systolic function; abnormal septal motion. · There is no evidence of pericardial effusion.      CT Chest Without Contrast Diagnostic    Result Date: 8/2/2022   DATE OF EXAM: 8/2/2022 11:14 AM  PROCEDURE: CT CHEST WO CONTRAST DIAGNOSTIC-  INDICATIONS: Sepsis; A41.9-Sepsis, unspecified organism; R65.20-Severe sepsis without septic shock; J96.01-Acute respiratory failure with hypoxia; I48.91-Unspecified atrial fibrillation; I50.33-Acute on chronic diastolic (congestive) heart failure  COMPARISON: CT chest 06/08/2022  TECHNIQUE: Routine transaxial  slices were obtained through the chest without the administration of intravenous contrast. Reconstructed coronal and sagittal images were also obtained. Automated exposure control and iterative construction methods were used.  FINDINGS: There are bibasilar effusions and atelectasis in the lower lungs. There are also atelectatic changes in the upper lobes. There is coronary artery calcification. There is a prominent precarinal lymph node which has been noted measuring 2.2 x 1.4 cm previously measuring 2.15 x 1.3 cm. This might be reactive in nature. There are smaller AP window lymph nodes.  There is a compression fracture of L1 which has been noted.      1.  There are bibasilar effusions as well as atelectasis within both lungs. 2.  Nonspecific precarinal lymph node as well as smaller lymph nodes that may be reactive in nature. This has been suggested. 3.  Atherosclerotic change including coronary artery calcification. 4.  Unchanged L1 compression fracture.  This report was finalized on 8/2/2022 11:39 AM by Dionicio Kenney MD.      FL Video Swallow With Speech Single Contrast    Result Date: 8/3/2022  EXAMINATION: FL VIDEO SWALLOW W SPEECH SINGLE-CONTRAST-  INDICATION: dysphagia; A41.9-Sepsis, unspecified organism; R65.20-Severe sepsis without septic shock; J96.01-Acute respiratory failure with hypoxia; I48.91-Unspecified atrial fibrillation; I50.33-Acute on chronic diastolic (congestive) heart failure  TECHNIQUE: 30 seconds of fluoroscopic time was used for this exam. 6 associated fluoroscopic loops were saved. The patient was evaluated in the seated lateral position while taking a variety of consistencies of barium by mouth under the direction of speech pathology.  COMPARISON: NONE  FINDINGS: 30 seconds of fluoroscopy provided for a modified barium swallow. Please see speech therapy report for full details and recommendations.       Fluoroscopy provided for a modified barium swallow. Please see speech therapy report  for full details and recommendations.    This report was finalized on 8/3/2022 3:41 PM by Seth Del Rosario MD.      XR Chest 1 View    Result Date: 8/8/2022  DATE OF EXAM: 8/8/2022 9:00 AM  PROCEDURE: XR CHEST 1 VW-  INDICATIONS: chf; A41.9-Sepsis, unspecified organism; R65.20-Severe sepsis without septic shock; J96.01-Acute respiratory failure with hypoxia; I48.91-Unspecified atrial fibrillation; I50.33-Acute on chronic diastolic (congestive) heart failure  COMPARISON: 8/5/2022  TECHNIQUE: Single radiographic AP view of the chest was obtained.  FINDINGS: Heart is enlarged. Vasculature is cephalized. There is increasing hazy opacity of the right hemithorax, which may represent worsening pneumonia. As the current exam is semiupright, this could also represent a layered pleural effusion. Previous 8/2/2022 CT scan did show a moderate effusion. Left lung actually appears improved, with mild, decreasing left midlung airspace disease, and mild left basilar atelectasis or effusion. No pneumothorax is seen. Basilar discoid       1. Increasing hazy opacity of the right hemithorax, whether worsening pneumonia, asymmetric edema, or layering of pleural effusion. 2. Mild remaining left midlung airspace disease which appears improved. 3. Persistent mild CHF  This report was finalized on 8/8/2022 10:42 AM by Dr. Rishi Whitehead MD.      XR Chest 1 View    Result Date: 8/5/2022  Examination: AP view of the chest Indication: Shortness of breath Comparison: 8/4/2022 Findings: The cardiomediastinal silhouette is enlarged. Patchy opacities are present in the perihilar right lung. Streaky opacities are present in the perihilar lungs and left base. Trace pleural effusions are present. No acute osseous abnormality is identified.     Impression: 1. Patchy opacities in the perihilar right lung, suspicious for pneumonia. 2. Regions of atelectasis in both lungs. 3. Cardiomegaly. 4. Trace pleural effusions. Electronically signed by:  Kenny Sifuentes  M.D.  8/5/2022 12:55 AM Mountain Time    XR Chest 1 View    Result Date: 8/4/2022   DATE OF EXAM: 8/4/2022 3:07 PM  PROCEDURE: XR CHEST 1 VW-  INDICATIONS: chf; A41.9-Sepsis, unspecified organism; R65.20-Severe sepsis without septic shock; J96.01-Acute respiratory failure with hypoxia; I48.91-Unspecified atrial fibrillation; I50.33-Acute on chronic diastolic (congestive) heart failure  COMPARISON: 08/02/2022  TECHNIQUE: Portable chest radiograph.  FINDINGS: Heart is mildly enlarged. There is central pulmonary vascular congestion. Persistent patchy bibasilar airspace disease with small bilateral pleural effusions. No pneumothorax. Aortic arch atherosclerosis.      Cardiomegaly and central pulmonary vascular congestion.  Persistent bibasilar airspace disease with small bilateral pleural effusions.  This report was finalized on 8/4/2022 3:35 PM by Jonathan Whatley MD.      XR Chest 1 View    Result Date: 8/2/2022  EXAMINATION: XR CHEST 1 VW DATE: 8/2/2022 6:02 AM INDICATION:  Dyspnea; COMPARISON:  June 7, 2022 FINDINGS: Defibrillator pads overlying left chest limiting evaluation. No focal consolidation, pleural effusion, or pneumothorax. Cardiomediastinal silhouette  without definite interval change. No acute osseous abnormality.     1.  No acute cardiopulmonary process. Electronically signed by:  Gabrielle Weir M.D.  8/2/2022 5:04 AM Mountain Time    Duplex Venous Lower Extremity - Bilateral CAR    Result Date: 8/4/2022  · Normal bilateral lower extremity venous duplex scan.        Results for orders placed during the hospital encounter of 08/02/22    Duplex Venous Lower Extremity - Bilateral CAR    Interpretation Summary  · Normal bilateral lower extremity venous duplex scan.      Results for orders placed during the hospital encounter of 08/02/22    Duplex Venous Lower Extremity - Bilateral CAR    Interpretation Summary  · Normal bilateral lower extremity venous duplex scan.      Results for orders placed during the  hospital encounter of 08/02/22    Adult Transthoracic Echo Complete W/ Cont if Necessary Per Protocol    Interpretation Summary  · The right atrial cavity is moderate to severely dilated.  · There is a small left pleural effusion. There is a small right pleural effusion.  · Moderate to severe tricuspid valve regurgitation is present.  · Estimated right ventricular systolic pressure from tricuspid regurgitation is markedly elevated (>55 mmHg). Calculated right ventricular systolic pressure from tricuspid regurgitation is 68 mmHg.  · Estimated left ventricular EF = 55% Left ventricular ejection fraction appears to be 51 - 55%. Left ventricular systolic function is normal.  · Moderate to severe pulmonary hypertension is present.  · Systolic and diastolic flattening of the interventricular septum consistent with right ventricle pressure and volume overload.  · The right ventricular cavity is moderately dilated with normal right ventricular wall thickness and severely reduced right ventricular systolic function; abnormal septal motion.  · There is no evidence of pericardial effusion.      Plan for Follow-up of Pending Labs/Results: none pending    Discharge Details        Discharge Medications      Changes to Medications      Instructions Start Date   rivaroxaban 15 MG tablet  Commonly known as: XARELTO  What changed:   · medication strength  · how much to take   15 mg, Oral, Daily With Dinner         Continue These Medications      Instructions Start Date   bumetanide 2 MG tablet  Commonly known as: BUMEX   2 mg, Oral, Daily      metoprolol tartrate 25 MG tablet  Commonly known as: LOPRESSOR   25 mg, Oral, 2 Times Daily         Stop These Medications    Calcium Carb-Cholecalciferol 600-500 MG-UNIT tablet     furosemide 20 MG tablet  Commonly known as: LASIX     potassium chloride 10 MEQ CR tablet  Commonly known as: K-DUR,KLOR-CON        ASK your doctor about these medications      Instructions Start Date    acetaminophen 325 MG tablet  Commonly known as: TYLENOL   650 mg, Oral, Every 6 Hours PRN      dextrose 40 % gel  Commonly known as: GLUTOSE   15 g, Oral, As Needed      Insulin Glargine 100 UNIT/ML injection pen  Commonly known as: LANTUS SOLOSTAR   25 Units, Subcutaneous, Nightly      ipratropium-albuterol 0.5-2.5 mg/3 ml nebulizer  Commonly known as: DUO-NEB   3 mL, Nebulization, Every 4 Hours PRN      lactulose 10 GM/15ML solution  Commonly known as: CHRONULAC   20 g, Oral, 2 Times Daily PRN      levothyroxine 75 MCG tablet  Commonly known as: SYNTHROID, LEVOTHROID   75 mcg, Oral, Every Early Morning      loperamide 2 MG capsule  Commonly known as: IMODIUM   2 mg, Oral, 4 Times Daily PRN      loratadine 10 MG tablet  Commonly known as: CLARITIN   10 mg, Oral, Daily      Melatonin 10 MG tablet   10 mg, Oral, Nightly      montelukast 10 MG tablet  Commonly known as: SINGULAIR   10 mg, Oral, Nightly      nystatin [id number] UNIT/GM powder  Commonly known as: MYCOSTATIN   1 application, Topical, 2 Times Daily      ondansetron 4 MG tablet  Commonly known as: ZOFRAN   4 mg, Oral, Every 8 Hours PRN      Robafen DM  MG/5ML syrup  Generic drug: dextromethorphan-guaifenesin   5 mL, Oral, Every 12 Hours PRN      Tab-A-Mike/Iron/Beta Carotene tablet tablet   1 tablet, Oral, Daily      traZODone 50 MG tablet  Commonly known as: DESYREL   50 mg, Oral, Nightly             Allergies   Allergen Reactions   • Lisinopril Unknown - High Severity         Discharge Disposition:  LTC w hospice    Diet:  Hospital:  Diet Order   Procedures   • Diet Regular; Thin; Cardiac, Consistent Carbohydrate, Lactose Restricted       Activity:  as tolerated    Restrictions or Other Recommendations:none         CODE STATUS:    Code Status and Medical Interventions:   Ordered at: 08/02/22 1206     Medical Intervention Limits:    NO intubation (DNI)    NO cardioversion    NO dialysis    NO vasopressors    NO artificial nutrition     Level Of  Support Discussed With:    Patient     Code Status (Patient has no pulse and is not breathing):    No CPR (Do Not Attempt to Resuscitate)     Medical Interventions (Patient has pulse or is breathing):    Limited Support       Future Appointments   Date Time Provider Department Center   8/9/2022  1:45 PM EMS 2  [facility] Carbone MD  08/09/22      Time Spent on Discharge:  I spent  45 minutes on this discharge activity which included: face-to-face encounter with the patient, reviewing the data in the system, coordination of the care with the nursing staff as well as consultants, documentation, and entering orders.

## 2022-08-09 NOTE — DISCHARGE PLACEMENT REQUEST
"Rosanna Browne (89 y.o. Female)             Date of Birth   05/05/1930    Social Security Number       Address   [address] [place] [address] [address] [address]    Home Phone   [phone]    MRN   [id number]       [employer]ism   Orthodox    Marital Status                               Admission Date   8/2/22    Admission Type   Emergency    Admitting Provider   Danielle Carbone MD    Attending Provider   Danielle Carbone MD    Department, Room/Bed   [facility] 3E, S333/1       Discharge Date       Discharge Disposition   Hospice/Medical Facility (DC - External)    Discharge Destination                               Attending Provider: Danielle Carbone MD    Allergies: Lisinopril    Isolation: None   Infection: None   Code Status: No CPR   Advance Care Planning Activity    Ht: 167.6 cm (66\")   Wt: 91.9 kg (202 lb 8 oz)    Admission Cmt: None   Principal Problem: Sepsis due to group B Streptococcus (HCC) [A40.1]                 Active Insurance as of 8/2/2022     Primary Coverage     Payor Plan Insurance Group Employer/Plan Group    MEDICARE MEDICARE A & B      Payor Plan Address Payor Plan Phone Number Payor Plan Fax Number Effective Dates    [address] [phone]  5/1/1995 - None Entered    [facility] [address]       Subscriber Name Subscriber Birth Date Member ID       ROSANNA BROWNE 5/5/1930 [id number]           Secondary Coverage     Payor Plan Insurance Group Employer/Plan Group    [facility]      Payor Plan Address Payor Plan Phone Number Payor Plan Fax Number Effective Dates    [address] [address] [phone]  10/1/2001 - None Entered    [facility] [address]       Subscriber Name Subscriber Birth Date Member ID       ROSANNA BROWNE 5/5/1930 [id number]           Tertiary Coverage     Payor Plan Insurance Group Employer/Plan Group    [place]      Payor Plan Address Payor Plan Phone Number Payor Plan Fax Number Effective Dates    [address] " [phone]  2022 - None Entered    [facility] [place] [address]       Subscriber Name Subscriber Birth Date Member ID       ARTHUR BROWNE 1930 [id number]                 Emergency Contacts      (Rel.) Home Phone Work Phone Mobile Phone    LISSET WEINBERG (Daughter) [phone] -- [phone]    ALLAN (POA)MARIELLA (Daughter) [phone] -- [phone]                 Discharge Summary      Danielle Carbone MD at 22 0837              [facility] Medicine Services  DISCHARGE SUMMARY    Patient Name: Arthur Browne  : 1930  MRN: [id number]    Date of Admission: 2022  5:47 AM  Date of Discharge:  2022  Primary Care Physician: Red Perez MD    Consults     Date and Time Order Name Status Description    2022  2:51 PM Inpatient Palliative Care MD Consult Completed     2022  4:31 AM Inpatient Cardiology Consult Completed     8/3/2022 12:34 AM Inpatient Infectious Diseases Consult Completed           Hospital Course     Presenting Problem:   Sepsis with acute hypoxic respiratory failure without septic shock, due to unspecified organism (HCC) [A41.9, R65.20, J96.01]    Active Hospital Problems    Diagnosis  POA   • **Sepsis due to group B Streptococcus (HCC) [A40.1]  Yes   • Demand ischemia ([facility]) [I24.8]  Yes   • Coronary artery disease involving native coronary artery of native heart with angina pectoris ([facility]) [I25.119]  Yes   • Permanent atrial fibrillation ([facility]) [I48.21]  Yes   • Hypomagnesemia [E83.42]  Yes   • Hypothyroidism [E03.9]  Yes   • CKD (chronic kidney disease) stage 3, GFR 30-59 ml/min ([facility]) [N18.30]  Yes   • Pulmonary hypertension (HCC) [I27.20]  Yes   • T2DM (type 2 diabetes mellitus) ([facility]) [E11.9]  Yes      Resolved Hospital Problems    Diagnosis Date Resolved POA   • Sepsis with acute hypoxic respiratory failure without septic shock, due to unspecified organism (HCC) [A41.9, R65.20, J96.01] 2022 Yes   • Elevated serum  creatinine [R79.89] 08/05/2022 Yes   • Thrombocytopenia (HCC) [D69.6] 08/05/2022 Yes   • Lactic acidosis [E87.2] 08/05/2022 Yes   • HTN (hypertension) [I10] 08/05/2022 Yes   • HLD (hyperlipidemia) [E78.5] 08/05/2022 Unknown   • Pulmonary hypertension (HCC) [I27.20] 08/05/2022 Yes   • Mitral valve regurgitation [I34.0] 08/05/2022 Yes          Hospital Course:  Rosanna Browne is a 89 y.o. female Rosanna Browne is a 89 y.o. female with PMH of HTN, HLD, T2DM on insulin, Hypothyroidism, Severe Pulmonary HTN, MVR, HFpEF, Afib on Xarelto and CKD II presented from [facility] with severe sepsis. Pt also noted to be hypoxic and in Afib with RVR. Febrile with leukocytosis, elevated lactate, elevated procal on presentation. CXr unremarkable. CT chest without contrast unremakable. RVP (including COVID19) negative. GI PCR negative    Severe sepsis source suspected secondary to RLE cellulitis vs urinary source. She was found to have group B strep bacteremia. Urine Cx grew proteus, susceptible to cefazoline. TTE performed showed no evidence of endocarditis, ID was consulted and recommended continuing cefazolin until 8/8 and then changing to cefuroxime to 250mg BID until 8/15. Probiotic while on abx    She was found to be hypoxic on room air at her living facility. She wears O2 continuously at baseline but unclear how much. CT chest performed showed bibasilar effusions but no significant acute pulmonary process. TTE performed showed moderate to severe pulmonary hypertension, R ventricular dysfunction and a normal LVEF. Cardiology was consulted and placed recommendations regarding diuresis. She received IV diuresis and eventually transitioned to PO.    Unfortunately despite diuresis and further full support measures, the patient continued to be uncomfortable and palliative was consulted to assist in GOC conversations as it was concluded that given her underlying severe pulmonary hypertension she is at her new  baseline.Family has elected for hospice services at discharge    Afib with RVR  -- [id number] of 5, on Xarelto at home- dose adjusted based on renal function  -- continue Metoprolol succinate as BP tolerates     T2DM  -- can continue home meds     HTN  -- Normotensive  -- Continue metoprolol     HLD  -- LDL at goal      Elevated creatinine on CKD II  -- unsure of baseline Cr.  Suspect her baseline may be 1.4-1.6 based off of measurements here  -- monitor/renally dose meds  - followed BMP     Hypomagnesemia  -- replace per protocol     TCP, resolved  -- mild, now resolved. Likely due to sepsis     Hypothyroidism  -- TSH elevated  --  home dose Synthroid increased from 75 mcg to 88mcg.  Needs repeat TSH in 4-6 weeks    Discharge Follow Up Recommendations for outpatient labs/diagnostics:  Hospice      Day of Discharge     HPI:   No new issues overnight, no increased O2 requirements    Review of Systems  Unable to assess secondary to mental status    Vital Signs:   Temp:  [97.1 °F (36.2 °C)-98.5 °F (36.9 °C)] 98.5 °F (36.9 °C)  Heart Rate:  [] 106  Resp:  [18-20] 18  BP: (105-129)/(58-95) 120/61  Flow (L/min):  [3] 3      Physical Exam:  Constitutional: No acute distress, chronically I'll and fatigued appearing not interactive and keeps eyes closed  HENT: NCAT, mucous membranes moist  Respiratory: decreased  to auscultation bilaterally, respiratory effort nl on NC  Cardiovascular: RRR, no murmurs, rubs, or gallops  Gastrointestinal: Positive bowel sounds, soft, nontender, nondistended  Musculoskeletal: BLE 1+ edwma  Psychiatric: Appropriate affect, cooperative  Neurologic: drowsy, follows only some commanda  Skin: BLE venous stasis      Pertinent  and/or Most Recent Results     LAB RESULTS:      Lab 08/08/22  0738 08/07/22  1559 08/05/22  0237 08/04/22  0544 08/03/22  0517 08/03/22  0016 08/02/22  1637 08/02/22  1250 08/02/22  0938   WBC 8.36 6.66 9.84 11.94* 15.54*  --   --   --   --    HEMOGLOBIN 12.8 12.4 12.5  12.0 11.7*  --   --   --   --    HEMATOCRIT 41.8 39.1 41.1 39.1 37.2  --   --   --   --    PLATELETS 189 160 149 125* 119*  --   --   --   --    NEUTROS ABS 6.11 4.82 8.02* 10.16*  --   --   --   --   --    IMMATURE GRANS (ABS) 0.07* 0.06* 0.04 0.05  --   --   --   --   --    LYMPHS ABS 1.27 0.76 1.00 0.97  --   --   --   --   --    MONOS ABS 0.69 0.72 0.58 0.62  --   --   --   --   --    EOS ABS 0.18 0.26 0.16 0.10  --   --   --   --   --    MCV 86.4 85.6 87.4 86.5 85.9  --   --   --   --    PROCALCITONIN  --   --  13.49*  --   --   --   --   --   --    LACTATE  --   --  1.1  --   --  1.9 2.3* 3.4* 3.1*   D DIMER QUANT  --   --  5.41* 4.62*  --   --   --   --   --          Lab 08/08/22  0738 08/07/22  1559 08/05/22  0237 08/04/22  0544 08/03/22  0517   SODIUM 138 134* 133* 131* 137   POTASSIUM 3.9 3.7 3.8 3.5 3.8   CHLORIDE 100 99 98 97* 98   CO2 29.0 26.0 26.0 23.0 25.0   ANION GAP 9.0 9.0 9.0 11.0 14.0   BUN 46* 44* 51* 48* 47*   CREATININE 1.27* 1.11* 1.49* 1.40* 1.58*   EGFR 39.8* 46.7* 32.8* 35.4* 30.6*   GLUCOSE 114* 103* 112* 122* 110*   CALCIUM 8.1* 8.1* 7.7* 7.4* 7.2*   MAGNESIUM  --  2.3 2.2 2.2 1.6*   HEMOGLOBIN A1C  --   --   --   --  9.00*         Lab 08/05/22  0237 08/04/22  0544   TOTAL PROTEIN 7.0 7.2   ALBUMIN 3.10* 2.90*   GLOBULIN 3.9 4.3   ALT (SGPT) 13 12   AST (SGOT) 22 25   BILIRUBIN 0.5 0.6   ALK PHOS 79 136*         Lab 08/05/22  0237   PROBNP 16,610.0*   TROPONIN T 0.045*         Lab 08/03/22  0517   CHOLESTEROL 82   LDL CHOL 42   HDL CHOL 23*   TRIGLYCERIDES 82             Lab 08/05/22  0209   PH, ARTERIAL 7.277*   PCO2, ARTERIAL 60.3*   PO2 ART 94.0   FIO2 40   HCO3 ART 28.1*   BASE EXCESS ART 0.2   CARBOXYHEMOGLOBIN 1.0     Brief Urine Lab Results  (Last result in the past 365 days)      Color   Clarity   Blood   Leuk Est   Nitrite   Protein   CREAT   Urine HCG        08/02/22 0629 Yellow   Cloudy   Moderate (2+)   Moderate (2+)   Negative   >=300 mg/dL (3+)               Microbiology  Results (last 10 days)     Procedure Component Value - Date/Time    COVID PRE-OP / PRE-PROCEDURE SCREENING ORDER (NO ISOLATION) - Swab, Nasopharynx [[id number]]  (Normal) Collected: 08/07/22 0940    Lab Status: Final result Specimen: Swab from Nasopharynx Updated: 08/07/22 1020    Narrative:      The following orders were created for panel order COVID PRE-OP / PRE-PROCEDURE SCREENING ORDER (NO ISOLATION) - Swab, Nasopharynx.  Procedure                               Abnormality         Status                     ---------                               -----------         ------                     COVID-19, ABBOTT IN-HOUS...[[id number]]  Normal              Final result                 Please view results for these tests on the individual orders.    COVID-19, ABBOTT IN-HOUSE,NASAL Swab (NO TRANSPORT MEDIA) 2 HR TAT - Swab, Nasopharynx [[id number]]  (Normal) Collected: 08/07/22 0940    Lab Status: Final result Specimen: Swab from Nasopharynx Updated: 08/07/22 1020     COVID19 Presumptive Negative    Narrative:      Fact sheet for providers: https://www.fda.gov/media/179401/download     Fact sheet for patients: https://www.fda.gov/media/137186/download    Test performed by PCR.  If inconsistent with clinical signs and symptoms patient should be tested with different authorized molecular test.    Gastrointestinal Panel, PCR - Stool, Per Rectum [[id number]]  (Normal) Collected: 08/04/22 1127    Lab Status: Final result Specimen: Stool from Per Rectum Updated: 08/04/22 1315     Campylobacter Not Detected     Plesiomonas shigelloides Not Detected     Salmonella Not Detected     Vibrio Not Detected     Vibrio cholerae Not Detected     Yersinia enterocolitica Not Detected     Enteroaggregative E. coli (EAEC) Not Detected     Enteropathogenic E. coli (EPEC) Not Detected     Enterotoxigenic E. coli (ETEC) lt/st Not Detected     Shiga-like toxin-producing E. coli (STEC) stx1/stx2 Not Detected     Shigella/Enteroinvasive E.  coli (EIEC) Not Detected     Cryptosporidium Not Detected     Cyclospora cayetanensis Not Detected     Entamoeba histolytica Not Detected     Giardia lamblia Not Detected     Adenovirus F40/41 Not Detected     Astrovirus Not Detected     Norovirus GI/GII Not Detected     Rotavirus A Not Detected     Sapovirus (I, II, IV or V) Not Detected    Blood Culture - Blood, Arm, Right [[id number]]  (Abnormal)  (Susceptibility) Collected: 08/02/22 0640    Lab Status: Final result Specimen: Blood from Arm, Right Updated: 08/04/22 0642     Blood Culture Streptococcus agalactiae (Group B)     Isolated from Aerobic and Anaerobic Bottles     Gram Stain Anaerobic Bottle Gram positive cocci in pairs and chains      Aerobic Bottle Gram positive cocci in pairs and chains    Susceptibility      Streptococcus agalactiae (Group B)      SUNITHA      Ceftriaxone Susceptible      Levofloxacin Susceptible      Penicillin G Susceptible      Vancomycin Susceptible                           Blood Culture ID, PCR - Blood, Arm, Right [[id number]]  (Abnormal) Collected: 08/02/22 0640    Lab Status: Final result Specimen: Blood from Arm, Right Updated: 08/02/22 2021     BCID, PCR Streptococcus agalactiae (Group B). Identification by BCID2 PCR.    Blood Culture - Blood, Arm, Left [[id number]]  (Abnormal) Collected: 08/02/22 0630    Lab Status: Final result Specimen: Blood from Arm, Left Updated: 08/04/22 0643     Blood Culture Streptococcus agalactiae (Group B)     Isolated from Aerobic and Anaerobic Bottles     Gram Stain Anaerobic Bottle Gram positive cocci in pairs and chains      Aerobic Bottle Gram positive cocci in pairs and chains    Narrative:      Refer to previous blood culture collected on  8/2/2022 at 0640 for MICs.    Urine Culture - Urine, Urine, Catheter [[id number]]  (Abnormal)  (Susceptibility) Collected: 08/02/22 0629    Lab Status: Final result Specimen: Urine, Catheter Updated: 08/05/22 0932     Urine Culture >100,000 CFU/mL Proteus  mirabilis    Narrative:      Colonization of the urinary tract without infection is common. Treatment is discouraged unless the patient is symptomatic, pregnant, or undergoing an invasive urologic procedure.    Susceptibility      Proteus mirabilis      SUNITHA      Amikacin Susceptible      Ampicillin Resistant     Ampicillin + Sulbactam Resistant     Cefazolin Susceptible      Cefepime Susceptible      Ceftazidime Susceptible      Ceftriaxone Susceptible      Gentamicin Resistant     Levofloxacin Resistant     Nitrofurantoin Resistant     Piperacillin + Tazobactam Susceptible      Tobramycin Resistant     Trimethoprim + Sulfamethoxazole Resistant                          COVID PRE-OP / PRE-PROCEDURE SCREENING ORDER (NO ISOLATION) - Swab, Nasopharynx [[id number]]  (Normal) Collected: 08/02/22 0607    Lab Status: Final result Specimen: Swab from Nasopharynx Updated: 08/02/22 0721    Narrative:      The following orders were created for panel order COVID PRE-OP / PRE-PROCEDURE SCREENING ORDER (NO ISOLATION) - Swab, Nasopharynx.  Procedure                               Abnormality         Status                     ---------                               -----------         ------                     Respiratory Panel PCR w/...[[id number]]  Normal              Final result                 Please view results for these tests on the individual orders.    Respiratory Panel PCR w/COVID-19(SARS-CoV-2) BRENTON/ELLA/HAILEE/PAD/COR/MAD/DEANN In-House, NP Swab in UTM/VTM, 3-4 HR TAT - Swab, Nasopharynx [[id number]]  (Normal) Collected: 08/02/22 0607    Lab Status: Final result Specimen: Swab from Nasopharynx Updated: 08/02/22 0721     ADENOVIRUS, PCR Not Detected     Coronavirus 229E Not Detected     Coronavirus HKU1 Not Detected     Coronavirus NL63 Not Detected     Coronavirus OC43 Not Detected     COVID19 Not Detected     Human Metapneumovirus Not Detected     Human Rhinovirus/Enterovirus Not Detected     Influenza A PCR Not Detected      Influenza B PCR Not Detected     Parainfluenza Virus 1 Not Detected     Parainfluenza Virus 2 Not Detected     Parainfluenza Virus 3 Not Detected     Parainfluenza Virus 4 Not Detected     RSV, PCR Not Detected     Bordetella pertussis pcr Not Detected     Bordetella parapertussis PCR Not Detected     Chlamydophila pneumoniae PCR Not Detected     Mycoplasma pneumo by PCR Not Detected    Narrative:      In the setting of a positive respiratory panel with a viral infection PLUS a negative procalcitonin without other underlying concern for bacterial infection, consider observing off antibiotics or discontinuation of antibiotics and continue supportive care. If the respiratory panel is positive for atypical bacterial infection (Bordetella pertussis, Chlamydophila pneumoniae, or Mycoplasma pneumoniae), consider antibiotic de-escalation to target atypical bacterial infection.          Adult Transthoracic Echo Complete W/ Cont if Necessary Per Protocol    Result Date: 8/3/2022  · The right atrial cavity is moderate to severely dilated. · There is a small left pleural effusion. There is a small right pleural effusion. · Moderate to severe tricuspid valve regurgitation is present. · Estimated right ventricular systolic pressure from tricuspid regurgitation is markedly elevated (>55 mmHg). Calculated right ventricular systolic pressure from tricuspid regurgitation is 68 mmHg. · Estimated left ventricular EF = 55% Left ventricular ejection fraction appears to be 51 - 55%. Left ventricular systolic function is normal. · Moderate to severe pulmonary hypertension is present. · Systolic and diastolic flattening of the interventricular septum consistent with right ventricle pressure and volume overload. · The right ventricular cavity is moderately dilated with normal right ventricular wall thickness and severely reduced right ventricular systolic function; abnormal septal motion. · There is no evidence of pericardial effusion.       CT Chest Without Contrast Diagnostic    Result Date: 8/2/2022   DATE OF EXAM: 8/2/2022 11:14 AM  PROCEDURE: CT CHEST WO CONTRAST DIAGNOSTIC-  INDICATIONS: Sepsis; A41.9-Sepsis, unspecified organism; R65.20-Severe sepsis without septic shock; J96.01-Acute respiratory failure with hypoxia; I48.91-Unspecified atrial fibrillation; I50.33-Acute on chronic diastolic (congestive) heart failure  COMPARISON: CT chest 06/08/2022  TECHNIQUE: Routine transaxial slices were obtained through the chest without the administration of intravenous contrast. Reconstructed coronal and sagittal images were also obtained. Automated exposure control and iterative construction methods were used.  FINDINGS: There are bibasilar effusions and atelectasis in the lower lungs. There are also atelectatic changes in the upper lobes. There is coronary artery calcification. There is a prominent precarinal lymph node which has been noted measuring 2.2 x 1.4 cm previously measuring 2.15 x 1.3 cm. This might be reactive in nature. There are smaller AP window lymph nodes.  There is a compression fracture of L1 which has been noted.      1.  There are bibasilar effusions as well as atelectasis within both lungs. 2.  Nonspecific precarinal lymph node as well as smaller lymph nodes that may be reactive in nature. This has been suggested. 3.  Atherosclerotic change including coronary artery calcification. 4.  Unchanged L1 compression fracture.  This report was finalized on 8/2/2022 11:39 AM by Dionicio Kenney MD.      FL Video Swallow With Speech Single Contrast    Result Date: 8/3/2022  EXAMINATION: FL VIDEO SWALLOW W SPEECH SINGLE-CONTRAST-  INDICATION: dysphagia; A41.9-Sepsis, unspecified organism; R65.20-Severe sepsis without septic shock; J96.01-Acute respiratory failure with hypoxia; I48.91-Unspecified atrial fibrillation; I50.33-Acute on chronic diastolic (congestive) heart failure  TECHNIQUE: 30 seconds of fluoroscopic time was used for this exam. 6  associated fluoroscopic loops were saved. The patient was evaluated in the seated lateral position while taking a variety of consistencies of barium by mouth under the direction of speech pathology.  COMPARISON: NONE  FINDINGS: 30 seconds of fluoroscopy provided for a modified barium swallow. Please see speech therapy report for full details and recommendations.       Fluoroscopy provided for a modified barium swallow. Please see speech therapy report for full details and recommendations.    This report was finalized on 8/3/2022 3:41 PM by Seth Del Rosario MD.      XR Chest 1 View    Result Date: 8/8/2022  DATE OF EXAM: 8/8/2022 9:00 AM  PROCEDURE: XR CHEST 1 VW-  INDICATIONS: chf; A41.9-Sepsis, unspecified organism; R65.20-Severe sepsis without septic shock; J96.01-Acute respiratory failure with hypoxia; I48.91-Unspecified atrial fibrillation; I50.33-Acute on chronic diastolic (congestive) heart failure  COMPARISON: 8/5/2022  TECHNIQUE: Single radiographic AP view of the chest was obtained.  FINDINGS: Heart is enlarged. Vasculature is cephalized. There is increasing hazy opacity of the right hemithorax, which may represent worsening pneumonia. As the current exam is semiupright, this could also represent a layered pleural effusion. Previous 8/2/2022 CT scan did show a moderate effusion. Left lung actually appears improved, with mild, decreasing left midlung airspace disease, and mild left basilar atelectasis or effusion. No pneumothorax is seen. Basilar discoid       1. Increasing hazy opacity of the right hemithorax, whether worsening pneumonia, asymmetric edema, or layering of pleural effusion. 2. Mild remaining left midlung airspace disease which appears improved. 3. Persistent mild CHF  This report was finalized on 8/8/2022 10:42 AM by Dr. Rishi Whitehead MD.      XR Chest 1 View    Result Date: 8/5/2022  Examination: AP view of the chest Indication: Shortness of breath Comparison: 8/4/2022 Findings: The  cardiomediastinal silhouette is enlarged. Patchy opacities are present in the perihilar right lung. Streaky opacities are present in the perihilar lungs and left base. Trace pleural effusions are present. No acute osseous abnormality is identified.     Impression: 1. Patchy opacities in the perihilar right lung, suspicious for pneumonia. 2. Regions of atelectasis in both lungs. 3. Cardiomegaly. 4. Trace pleural effusions. Electronically signed by:  Kenny Sifuentes M.D.  8/5/2022 12:55 AM Mountain Time    XR Chest 1 View    Result Date: 8/4/2022   DATE OF EXAM: 8/4/2022 3:07 PM  PROCEDURE: XR CHEST 1 VW-  INDICATIONS: chf; A41.9-Sepsis, unspecified organism; R65.20-Severe sepsis without septic shock; J96.01-Acute respiratory failure with hypoxia; I48.91-Unspecified atrial fibrillation; I50.33-Acute on chronic diastolic (congestive) heart failure  COMPARISON: 08/02/2022  TECHNIQUE: Portable chest radiograph.  FINDINGS: Heart is mildly enlarged. There is central pulmonary vascular congestion. Persistent patchy bibasilar airspace disease with small bilateral pleural effusions. No pneumothorax. Aortic arch atherosclerosis.      Cardiomegaly and central pulmonary vascular congestion.  Persistent bibasilar airspace disease with small bilateral pleural effusions.  This report was finalized on 8/4/2022 3:35 PM by Jonathan Whatley MD.      XR Chest 1 View    Result Date: 8/2/2022  EXAMINATION: XR CHEST 1 VW DATE: 8/2/2022 6:02 AM INDICATION:  Dyspnea; COMPARISON:  June 7, 2022 FINDINGS: Defibrillator pads overlying left chest limiting evaluation. No focal consolidation, pleural effusion, or pneumothorax. Cardiomediastinal silhouette  without definite interval change. No acute osseous abnormality.     1.  No acute cardiopulmonary process. Electronically signed by:  Gabrielle Weir M.D.  8/2/2022 5:04 AM Mountain Time    Duplex Venous Lower Extremity - Bilateral CAR    Result Date: 8/4/2022  · Normal bilateral lower extremity venous  duplex scan.        Results for orders placed during the hospital encounter of 08/02/22    Duplex Venous Lower Extremity - Bilateral CAR    Interpretation Summary  · Normal bilateral lower extremity venous duplex scan.      Results for orders placed during the hospital encounter of 08/02/22    Duplex Venous Lower Extremity - Bilateral CAR    Interpretation Summary  · Normal bilateral lower extremity venous duplex scan.      Results for orders placed during the hospital encounter of 08/02/22    Adult Transthoracic Echo Complete W/ Cont if Necessary Per Protocol    Interpretation Summary  · The right atrial cavity is moderate to severely dilated.  · There is a small left pleural effusion. There is a small right pleural effusion.  · Moderate to severe tricuspid valve regurgitation is present.  · Estimated right ventricular systolic pressure from tricuspid regurgitation is markedly elevated (>55 mmHg). Calculated right ventricular systolic pressure from tricuspid regurgitation is 68 mmHg.  · Estimated left ventricular EF = 55% Left ventricular ejection fraction appears to be 51 - 55%. Left ventricular systolic function is normal.  · Moderate to severe pulmonary hypertension is present.  · Systolic and diastolic flattening of the interventricular septum consistent with right ventricle pressure and volume overload.  · The right ventricular cavity is moderately dilated with normal right ventricular wall thickness and severely reduced right ventricular systolic function; abnormal septal motion.  · There is no evidence of pericardial effusion.      Plan for Follow-up of Pending Labs/Results: none pending    Discharge Details        Discharge Medications      Changes to Medications      Instructions Start Date   rivaroxaban 15 MG tablet  Commonly known as: XARELTO  What changed:   · medication strength  · how much to take   15 mg, Oral, Daily With Dinner         Continue These Medications      Instructions Start Date    bumetanide 2 MG tablet  Commonly known as: BUMEX   2 mg, Oral, Daily      metoprolol tartrate 25 MG tablet  Commonly known as: LOPRESSOR   25 mg, Oral, 2 Times Daily         Stop These Medications    Calcium Carb-Cholecalciferol 600-500 MG-UNIT tablet     furosemide 20 MG tablet  Commonly known as: LASIX     potassium chloride 10 MEQ CR tablet  Commonly known as: K-DUR,KLOR-CON        ASK your doctor about these medications      Instructions Start Date   acetaminophen 325 MG tablet  Commonly known as: TYLENOL   650 mg, Oral, Every 6 Hours PRN      dextrose 40 % gel  Commonly known as: GLUTOSE   15 g, Oral, As Needed      Insulin Glargine 100 UNIT/ML injection pen  Commonly known as: LANTUS SOLOSTAR   25 Units, Subcutaneous, Nightly      ipratropium-albuterol 0.5-2.5 mg/3 ml nebulizer  Commonly known as: DUO-NEB   3 mL, Nebulization, Every 4 Hours PRN      lactulose 10 GM/15ML solution  Commonly known as: CHRONULAC   20 g, Oral, 2 Times Daily PRN      levothyroxine 75 MCG tablet  Commonly known as: SYNTHROID, LEVOTHROID   75 mcg, Oral, Every Early Morning      loperamide 2 MG capsule  Commonly known as: IMODIUM   2 mg, Oral, 4 Times Daily PRN      loratadine 10 MG tablet  Commonly known as: CLARITIN   10 mg, Oral, Daily      Melatonin 10 MG tablet   10 mg, Oral, Nightly      montelukast 10 MG tablet  Commonly known as: SINGULAIR   10 mg, Oral, Nightly      nystatin 561641 UNIT/GM powder  Commonly known as: MYCOSTATIN   1 application, Topical, 2 Times Daily      ondansetron 4 MG tablet  Commonly known as: ZOFRAN   4 mg, Oral, Every 8 Hours PRN      Robafen DM  MG/5ML syrup  Generic drug: dextromethorphan-guaifenesin   5 mL, Oral, Every 12 Hours PRN      Tab-A-Mike/Iron/Beta Carotene tablet tablet   1 tablet, Oral, Daily      traZODone 50 MG tablet  Commonly known as: DESYREL   50 mg, Oral, Nightly             Allergies   Allergen Reactions   • Lisinopril Unknown - High Severity         Discharge  Disposition:  LTC w hospice    Diet:  Hospital:  Diet Order   Procedures   • Diet Regular; Thin; Cardiac, Consistent Carbohydrate, Lactose Restricted       Activity:  as tolerated    Restrictions or Other Recommendations:none         CODE STATUS:    Code Status and Medical Interventions:   Ordered at: 08/02/22 1206     Medical Intervention Limits:    NO intubation (DNI)    NO cardioversion    NO dialysis    NO vasopressors    NO artificial nutrition     Level Of Support Discussed With:    Patient     Code Status (Patient has no pulse and is not breathing):    No CPR (Do Not Attempt to Resuscitate)     Medical Interventions (Patient has pulse or is breathing):    Limited Support       Future Appointments   Date Time Provider Department Center   8/9/2022  1:45 PM EMS 2  [facility]                 Danielle Carbone MD  08/09/22      Time Spent on Discharge:  I spent  45 minutes on this discharge activity which included: face-to-face encounter with the patient, reviewing the data in the system, coordination of the care with the nursing staff as well as consultants, documentation, and entering orders.            Electronically signed by Danielle Carbone MD at 08/09/22 8297

## 2022-08-09 NOTE — PROGRESS NOTES
Spoke with Charissa (dtr/POMURPHY) on phone at 9:22am who reports she and her sister would like patient to discharge back to [facility] with hospice. Discussed having Hospice call Charissa to discuss. Spoke with Hospice Liaison, CM notified as well.

## 2022-08-09 NOTE — CASE MANAGEMENT/SOCIAL WORK
Case Management Discharge Note      Final Note: Mrs. Browne is scheduled to return to her longterm care medicaid bed at [facility] today at 13:45 via ambulance. I confirmed with her daughter Charissa that Mrs. Browne will be followed by hospice services there. I updated Edgar in admissions. He verbalizes understanding and states that they can accomodate this. Nurse to call report to [phone] ext [phone].         Selected Continued Care - Admitted Since 8/2/2022     Destination Coordination complete.    Service Provider Selected Services Address Phone Fax Patient Preferred    [facility] ([facility] HOME  [facility] [address] [facility] [id number] [phone] --          Durable Medical Equipment    No services have been selected for the patient.              Dialysis/Infusion    No services have been selected for the patient.              Home Medical Care    No services have been selected for the patient.              Therapy    No services have been selected for the patient.              Community Resources    No services have been selected for the patient.              Community & DME    No services have been selected for the patient.                  Transportation Services  Ambulance: [facility]    Final Discharge Disposition Code: 04 - intermediate care facility

## 2022-08-09 NOTE — DISCHARGE PLACEMENT REQUEST
"Rosanna Browne (89 y.o. Female)     Referred by: Suzette Burt APRN               Date of Birth   05/05/1930    Social Security Number       Address   [address] [place] [address] [address] [address]    Home Phone   [phone]    MRN   [id number]       Judaism   Jewish    Marital Status                               Admission Date   8/2/22    Admission Type   Emergency    Admitting Provider   Danielle Carbone MD    Attending Provider   Danielle Carbone MD    Department, Room/Bed   [facility] 3E, S333/1       Discharge Date       Discharge Disposition       Discharge Destination                               Attending Provider: Danielle Carbone MD    Allergies: Lisinopril    Isolation: None   Infection: None   Code Status: No CPR   Advance Care Planning Activity    Ht: 167.6 cm (66\")   Wt: 91.9 kg (202 lb 8 oz)    Admission Cmt: None   Principal Problem: Sepsis due to group B Streptococcus (HCC) [A40.1]                 Active Insurance as of 8/2/2022     Primary Coverage     Payor Plan Insurance Group Employer/Plan Group    MEDICARE MEDICARE A & B      Payor Plan Address Payor Plan Phone Number Payor Plan Fax Number Effective Dates    [address] [phone]  5/1/1995 - None Entered    [facility] [id number]       Subscriber Name Subscriber Birth Date Member ID       ROSANNA BROWNE 5/5/1930 [id number]           Secondary Coverage     Payor Plan Insurance Group Employer/Plan Group    [facility]      Payor Plan Address Payor Plan Phone Number Payor Plan Fax Number Effective Dates    [address] [address] [phone]  10/1/2001 - None Entered    [facility] [address]       Subscriber Name Subscriber Birth Date Member ID       ROSANNA BROWNE 5/5/1930 [id number]           Tertiary Coverage     Payor Plan Insurance Group Employer/Plan Group    [place]      Payor Plan Address Payor Plan Phone Number Payor Plan Fax Number Effective Dates    [address] " [phone]  6/7/2022 - None Entered    [facility] [id number]       Subscriber Name Subscriber Birth Date Member ID       ARTHUR BROWNE 5/5/1930 [id number]                 Emergency Contacts      (Rel.) Home Phone Work Phone Mobile Phone    LISSET WEINBERG (Daughter) [phone] -- [phone]    ALLAN (POA)MARIELLA (Daughter) [phone] -- [phone]            Insurance Information                MEDICARE/MEDICARE A & B Phone: [phone]    Subscriber: Arthur Browne Subscriber#: [id number]    Group#: -- Precert#: --        / Phone: [phone]    Subscriber: Pavan Arthur M Subscriber#: [id number]    Group#: -- Precert#: --        [place] Phone: [phone]    Subscriber: Arthur Browne Subscriber#: [id number]    Group#: -- Precert#: --          Problem List           Codes Noted - Resolved       Hospital    Demand ischemia (HCC) ICD-10-CM: I24.8  ICD-9-CM: 411.89 8/8/2022 - Present    Coronary artery disease involving native coronary artery of native heart with angina pectoris (HCC) ICD-10-CM: I25.119  ICD-9-CM: 414.01, 413.9 8/5/2022 - Present    * (Principal) Sepsis due to group B Streptococcus (HCC) ICD-10-CM: A40.1  ICD-9-CM: 038.0, 995.91 8/3/2022 - Present    Permanent atrial fibrillation (HCC) ICD-10-CM: I48.21  ICD-9-CM: 427.31 8/2/2022 - Present    Hypomagnesemia ICD-10-CM: E83.42  ICD-9-CM: 275.2 8/2/2022 - Present    Hypothyroidism ICD-10-CM: E03.9  ICD-9-CM: 244.9 8/2/2022 - Present    CKD (chronic kidney disease) stage 3, GFR 30-59 ml/min (HCC) ICD-10-CM: N18.30  ICD-9-CM: 585.3 8/2/2022 - Present    Pulmonary hypertension (HCC) ICD-10-CM: I27.20  ICD-9-CM: 416.8 8/2/2022 - Present    T2DM (type 2 diabetes mellitus) (HCC) ICD-10-CM: E11.9  ICD-9-CM: 250.00 8/2/2022 - Present             History & Physical      GaneshMaria C MD at 08/02/22 0815              [facility]  Services  HISTORY AND PHYSICAL    Patient Name: Rosanna Browne  : 1930  MRN: [id number]  Primary Care Physician: Red Perez MD  Date of admission: 2022      Subjective   Subjective     Chief Complaint:  Sepsis    HPI:  Rosanna Browne is a 89 y.o. female with PMH of HTN, HLD, T2DM on insulin, Hypothyroidism, Severe Pulmonary HTN, MVR, HFpEF, Afib on Xarelto and CKD II presented from [facility] with severe sepsis. Pt reports that she has had a GI bug the last few days with multiple episodes of emesis. She denies any diarrhea.  Per pt and her daughter, the entire facility has had this GIB in the last few days. Pt reports that she typically wears O2 at baseline (she's unsure of how much) but states that she felt more SOA this am.  Pt denies having had a fever prior to today.        Review of Systems   Gen- No fevers, chills  CV- No chest pain, palpitations  Resp- No cough, + dyspnea  GI-as above      All other systems reviewed and are negative.     Personal History     Past Medical History:   Diagnosis Date   • A-fib (HCC)    • CHF (congestive heart failure) (HCC)    • CKD (chronic kidney disease), stage II    • Diabetes (HCC)    • HLD (hyperlipidemia)    • Hypertension    • Pulmonary hypertension (HCC)    • Thyroid disease              Past Surgical History:   Procedure Laterality Date   • CHOLECYSTECTOMY     • HIP ARTHROPLASTY     • TUBAL ABDOMINAL LIGATION         Family History: No significant family history. Otherwise pertinent FHx was reviewed and unremarkable.     Social History:  reports that she has quit smoking. Her smoking use included cigarettes. She has never used smokeless tobacco. She reports that she does not drink alcohol and does not use drugs.  Social History     Social History Narrative   • Not on file       Medications:  Available home medication information reviewed.  (Not in a hospital admission)    No current facility-administered medications on file prior  to encounter.     Current Outpatient Medications on File Prior to Encounter   Medication Sig Dispense Refill   • acetaminophen (TYLENOL) 325 MG tablet Take 650 mg by mouth Every 6 (Six) Hours As Needed for Mild Pain , Headache or Fever.     • bumetanide (BUMEX) 2 MG tablet Take 2 mg by mouth Daily.     • Calcium Carb-Cholecalciferol 600-500 MG-UNIT tablet Take 1 tablet by mouth Daily.     • dextromethorphan-guaifenesin (Robafen DM)  MG/5ML syrup Take 5 mL by mouth Every 12 (Twelve) Hours As Needed.     • furosemide (LASIX) 20 MG tablet Take 20 mg by mouth Daily.     • Insulin Glargine (LANTUS SOLOSTAR) 100 UNIT/ML injection pen Inject 25 Units under the skin into the appropriate area as directed Every Night.     • ipratropium-albuterol (DUO-NEB) 0.5-2.5 mg/3 ml nebulizer Take 3 mL by nebulization Every 4 (Four) Hours As Needed for Wheezing or Shortness of Air.     • lactulose (CHRONULAC) 10 GM/15ML solution Take 20 g by mouth 2 (Two) Times a Day As Needed.     • levothyroxine (SYNTHROID, LEVOTHROID) 75 MCG tablet Take 75 mcg by mouth Every Morning.     • loperamide (IMODIUM) 2 MG capsule Take 2 mg by mouth 4 (Four) Times a Day As Needed for Diarrhea.     • loratadine (CLARITIN) 10 MG tablet Take 10 mg by mouth Daily.     • Melatonin 10 MG tablet Take 10 mg by mouth Every Night.     • metoprolol tartrate (LOPRESSOR) 25 MG tablet Take 25 mg by mouth 2 (Two) Times a Day.     • montelukast (SINGULAIR) 10 MG tablet Take 10 mg by mouth Every Night.     • multivitamin with minerals (Tab-A-Mike/Iron/Beta Carotene) tablet tablet Take 1 tablet by mouth Daily.     • nystatin (MYCOSTATIN) 078748 UNIT/GM powder Apply 1 application topically to the appropriate area as directed 2 (Two) Times a Day.     • ondansetron (ZOFRAN) 4 MG tablet Take 4 mg by mouth Every 8 (Eight) Hours As Needed for Nausea or Vomiting.     • potassium chloride (K-DUR,KLOR-CON) 10 MEQ CR tablet Take 10 mEq by mouth Daily.     • rivaroxaban (XARELTO)  20 MG tablet Take 20 mg by mouth Daily With Dinner.     • traZODone (DESYREL) 50 MG tablet Take 50 mg by mouth Every Night.     • dextrose (GLUTOSE) 40 % gel Take 15 g by mouth As Needed for Low Blood Sugar.           Allergies   Allergen Reactions   • Lisinopril Unknown - High Severity       Objective   Objective     Vital Signs:   Temp:  [102 °F (38.9 °C)] 102 °F (38.9 °C)  Heart Rate:  [111-200] 111  Resp:  [40] 40  BP: (101-181)/() 101/52  Flow (L/min):  [4] 4       Physical Exam   Constitutional: Awake, alert  Eyes: PERRLA, sclerae anicteric, no conjunctival injection  HENT: NCAT, mucous membranes dry  Neck: Supple, no thyromegaly, no lymphadenopathy, trachea midline  Respiratory: rhonchi on left, right clear to anterior exam only (pt unable to lift herself for posterior auscultation)  Cardiovascular: irregularly irregular with rates in the low 100s, no murmurs, rubs, or gallops, palpable pedal pulses bilaterally  Gastrointestinal: Positive bowel sounds, soft, nontender, nondistended  Musculoskeletal: No bilateral ankle edema, no clubbing or cyanosis to extremities  Psychiatric: Appropriate affect, cooperative  Neurologic: Oriented x 3, strength symmetric in all extremities, Cranial Nerves grossly intact to confrontation, speech clear  Skin: No rashes      Result Review:  I have personally reviewed the results from the time of this admission to 8/2/2022 08:15 EDT and agree with these findings:  []  Laboratory list / accordion  [x]  Microbiology  [x]  Radiology  []  EKG/Telemetry   []  Cardiology/Vascular   []  Pathology  [x]  Old records  []  Other:  Most notable findings include: see assessment and plan below      LAB RESULTS:      Lab 08/02/22  0602   WBC 15.98*   HEMOGLOBIN 13.5   HEMATOCRIT 43.3   PLATELETS 137*   NEUTROS ABS 14.26*   IMMATURE GRANS (ABS) 0.16*   LYMPHS ABS 1.04   MONOS ABS 0.46   EOS ABS 0.02   MCV 85.7   CRP 13.77*   PROCALCITONIN 4.82*   LACTATE 2.9*         Lab 08/02/22  0602    SODIUM 133*   POTASSIUM 4.2   CHLORIDE 92*   CO2 27.0   ANION GAP 14.0   BUN 43*   CREATININE 1.37*   EGFR 36.3*   GLUCOSE 250*   CALCIUM 8.6   MAGNESIUM 1.5*   PHOSPHORUS 2.9   TSH 5.410*         Lab 08/02/22  0602   TOTAL PROTEIN 8.1   ALBUMIN 3.70   GLOBULIN 4.4   ALT (SGPT) 13   AST (SGOT) 30   BILIRUBIN 1.4*   ALK PHOS 133*   LIPASE 17         Lab 08/02/22  0602   PROBNP 9,106.0*   TROPONIN T 0.054*                 Lab 08/02/22  0704   PH, ARTERIAL 7.420   PCO2, ARTERIAL 43.8   PO2 ART 63.8*   FIO2 44   HCO3 ART 28.4*   BASE EXCESS ART 3.3*   CARBOXYHEMOGLOBIN 1.4     UA    Urinalysis 8/2/22 8/2/22    0629 0629   Squamous Epithelial Cells, UA  3-6 (A)   Specific Gravity, UA 1.016    Ketones, UA Trace (A)    Blood, UA Moderate (2+) (A)    Leukocytes, UA Moderate (2+) (A)    Nitrite, UA Negative    RBC, UA  None Seen   WBC, UA  3-5 (A)   Bacteria, UA  Trace   (A) Abnormal value              Microbiology Results (last 10 days)     Procedure Component Value - Date/Time    COVID PRE-OP / PRE-PROCEDURE SCREENING ORDER (NO ISOLATION) - Swab, Nasopharynx [[id number]]  (Normal) Collected: 08/02/22 0607    Lab Status: Final result Specimen: Swab from Nasopharynx Updated: 08/02/22 0721    Narrative:      The following orders were created for panel order COVID PRE-OP / PRE-PROCEDURE SCREENING ORDER (NO ISOLATION) - Swab, Nasopharynx.  Procedure                               Abnormality         Status                     ---------                               -----------         ------                     Respiratory Panel PCR w/...[[id number]]  Normal              Final result                 Please view results for these tests on the individual orders.    Respiratory Panel PCR w/COVID-19(SARS-CoV-2) BRENTON/ELLA/HAILEE/PAD/COR/MAD/DEANN In-House, NP Swab in UTM/VTM, 3-4 HR TAT - Swab, Nasopharynx [[id number]]  (Normal) Collected: 08/02/22 0607    Lab Status: Final result Specimen: Swab from Nasopharynx Updated: 08/02/22 0721      ADENOVIRUS, PCR Not Detected     Coronavirus 229E Not Detected     Coronavirus HKU1 Not Detected     Coronavirus NL63 Not Detected     Coronavirus OC43 Not Detected     COVID19 Not Detected     Human Metapneumovirus Not Detected     Human Rhinovirus/Enterovirus Not Detected     Influenza A PCR Not Detected     Influenza B PCR Not Detected     Parainfluenza Virus 1 Not Detected     Parainfluenza Virus 2 Not Detected     Parainfluenza Virus 3 Not Detected     Parainfluenza Virus 4 Not Detected     RSV, PCR Not Detected     Bordetella pertussis pcr Not Detected     Bordetella parapertussis PCR Not Detected     Chlamydophila pneumoniae PCR Not Detected     Mycoplasma pneumo by PCR Not Detected    Narrative:      In the setting of a positive respiratory panel with a viral infection PLUS a negative procalcitonin without other underlying concern for bacterial infection, consider observing off antibiotics or discontinuation of antibiotics and continue supportive care. If the respiratory panel is positive for atypical bacterial infection (Bordetella pertussis, Chlamydophila pneumoniae, or Mycoplasma pneumoniae), consider antibiotic de-escalation to target atypical bacterial infection.          XR Chest 1 View    Result Date: 8/2/2022  EXAMINATION: XR CHEST 1 VW DATE: 8/2/2022 6:02 AM INDICATION:  Dyspnea; COMPARISON:  June 7, 2022 FINDINGS: Defibrillator pads overlying left chest limiting evaluation. No focal consolidation, pleural effusion, or pneumothorax. Cardiomediastinal silhouette  without definite interval change. No acute osseous abnormality.     Impression: 1.  No acute cardiopulmonary process. Electronically signed by:  Gabrielle Weir M.D.  8/2/2022 5:04 AM Mountain Time          Assessment & Plan   Assessment & Plan     Active Hospital Problems    Diagnosis  POA   • Sepsis with acute hypoxic respiratory failure without septic shock, due to unspecified organism (HCC) [A41.9, R65.20, J96.01]  Yes     Priority: High    • A-fib (HCC) [I48.91]  Unknown     Priority: High   • Elevated serum creatinine [R79.89]  Unknown     Priority: Medium   • Lactic acidosis [E87.2]  Unknown     Priority: Medium   • (HFpEF) heart failure with preserved ejection fraction (HCC) [I50.30]  Unknown     Priority: Medium   • Pulmonary hypertension (HCC) [I27.20]  Unknown     Priority: Medium   • Thrombocytopenia (HCC) [D69.6]  Unknown     Priority: Low   • Hypomagnesemia [E83.42]  Unknown     Priority: Low   • HTN (hypertension) [I10]  Unknown     Priority: Low   • HLD (hyperlipidemia) [E78.5]  Unknown     Priority: Low   • Hypothyroidism [E03.9]  Unknown     Priority: Low   • CKD (chronic kidney disease), stage II [N18.2]  Unknown     Priority: Low   • Mitral valve regurgitation [I34.0]  Unknown     Priority: Low   • T2DM (type 2 diabetes mellitus) (HCC) [E11.9]  Unknown     Priority: Low       Ms. Browne is a 89 yo F with PMH of HTN, HLD, T2DM on insulin, Hypothyroidism, Severe Pulmonary HTN, MVR, HFpEF, Afib on Xarelto and CKD II presented from [facility] with severe sepsis. Pt also noted to be hypoxic and in Afib with RVR.    Plan:    Severe Sepsis  -- febrile with leukocytosis and elevated lactate  -- procal elevated  -- source as of yet unclear, however, suspect urinary given UA with moderate LE, 3-5 WBCs and trace bacteria.    -- could also be respiratory, however, CXR unremarkable  -- get CT chest without contrast  -- RVP (including COVID19) negative  -- blood and UCx PENDING  -- continue Vanc/Zosyn  -- gentle IVFs for 10 hours (given dose of IV Lasix in the ED)  -- repeat lactate as per protocol  -- recent GI symptoms and reported GI illness at her facility.  Will check GI PCR.     Acute hypoxic respiratory failure  H/o Severe Pulmonary HTN  H/o HFpEF  -- pt with noted hypoxia on RA at NH.  Wears supplemental O2 continuously at baseline but unsure of how many liters  -- currently on NRB  -- suspect due aspiration from recent  episodes of emesis vs flash pulm edema due to Afib with RVR. CXR does not show pulmonary edema  -- given IV Lasix in the ED, defer further diuresis for now given borderline hypotensive. Give IVFs for 10 hours. Monitor closely  -- get TTE to assess for EF  -- will do duonebs for now  -- get CT chest    Afib with RVR  -- [id number] of 5, on Xarelto at home  -- continue Metoprolol succinate as BP tolerates (will need accurate med list from NH)  -- currently on dilt gtt    T2DM  -- hold home meds  -- check HbA1C in am  -- SSI with low dose basal insulin for now    HTN  -- hold antihypertensives for now as borderline hypotensive, will resume Metoprolol for rate control if BP can tolerate    HLD  -- continue statin once have med list    CKD II  -- unsure of baseline Cr, Cr currently 1.37  -- monitor/renally dose meds    Hypomagnesemia  -- replace per protocol    TCP  -- mild, monitor. Likely due to sepsis?    Hypothyroidism  -- TSH elevated  -- will increase home dose Synthroid from 75 mcg to 88mcg.  Needs repeat TSH in 4-6 weeks      DVT prophylaxis:  Anticoagulated with Xarelto      CODE STATUS:  DNR/DNI. Confirmed with both the patient and her daughter via telephone.   Code Status and Medical Interventions:   Ordered at: 08/02/22 1206     Medical Intervention Limits:    NO intubation (DNI)    NO cardioversion    NO dialysis    NO vasopressors    NO artificial nutrition     Level Of Support Discussed With:    Patient     Code Status (Patient has no pulse and is not breathing):    No CPR (Do Not Attempt to Resuscitate)     Medical Interventions (Patient has pulse or is breathing):    Limited Support         Maria C Andersen MD  08/02/22    Electronically signed by Maria C Andersen MD at 08/02/22 1212          Emergency Department Notes      Saeed Bush MD at 08/02/22 0614      Procedure Orders    1. Critical Care [[id number]] ordered by Saeed Bush MD               Subjective   Mrs Browne is sent by  ambulance from [facility] with respiratory distress.  EMS reports that staff told them they were doing morning rounds and found her in respiratory distress.  She is not able to provide any additional history and no additional history has been given up to this point.  EMS noted rapid atrial fibrillation and expiratory wheezing and gave nebulized albuterol.  Mrs. Browne tells me that she did not want to be here and that staff made her come.  She denies pain anywhere.      History provided by:  Patient and nursing home  Shortness of Breath  Severity:  Severe  Onset quality:  Gradual  Timing:  Constant  Progression:  Worsening  Chronicity:  New  Context comment:  Accompanied by fever  Relieved by:  Nothing  Worsened by:  Nothing  Ineffective treatments:  None tried  Associated symptoms: fever and wheezing    Associated symptoms: no abdominal pain, no chest pain, no cough, no headaches and no vomiting        Review of Systems   Constitutional: Positive for fever.   HENT: Negative for congestion and rhinorrhea.    Respiratory: Positive for shortness of breath and wheezing. Negative for cough.    Cardiovascular: Negative for chest pain.   Gastrointestinal: Positive for nausea. Negative for abdominal pain and vomiting.   Genitourinary: Negative for difficulty urinating and dysuria.   Musculoskeletal: Negative for back pain.   Neurological: Positive for weakness. Negative for headaches.   All other systems reviewed and are negative.      Past Medical History:   Diagnosis Date   • A-fib (HCC)    • CHF (congestive heart failure) (HCC)    • CKD (chronic kidney disease), stage II    • Diabetes (HCC)    • HLD (hyperlipidemia)    • Hypertension    • Pulmonary hypertension (HCC)    • Thyroid disease        Allergies   Allergen Reactions   • Lisinopril Unknown - High Severity       Past Surgical History:   Procedure Laterality Date   • CHOLECYSTECTOMY     • HIP ARTHROPLASTY     • TUBAL ABDOMINAL LIGATION          History reviewed. No pertinent family history.    Social History     Socioeconomic History   • Marital status:    Tobacco Use   • Smoking status: Former Smoker     Types: Cigarettes   • Smokeless tobacco: Never Used   Substance and Sexual Activity   • Alcohol use: Never   • Drug use: Never           Objective   Physical Exam  Vitals and nursing note reviewed.   Constitutional:       General: She is in acute distress.      Appearance: She is well-developed. She is ill-appearing.   HENT:      Head: Normocephalic and atraumatic.      Mouth/Throat:      Mouth: Mucous membranes are moist.   Neck:      Vascular: No JVD.   Cardiovascular:      Rate and Rhythm: Tachycardia present. Rhythm irregular.      Heart sounds: No murmur heard.  Pulmonary:      Effort: Tachypnea, accessory muscle usage and respiratory distress present.      Breath sounds: Wheezing present. No decreased breath sounds, rhonchi or rales.   Abdominal:      Palpations: Abdomen is soft.      Tenderness: There is no abdominal tenderness. There is no guarding or rebound.   Musculoskeletal:      Cervical back: Normal range of motion and neck supple.      Right lower leg: No tenderness. No edema.      Left lower leg: No tenderness. No edema.   Skin:     General: Skin is warm and dry.      Capillary Refill: Capillary refill takes less than 2 seconds.      Findings: No rash.   Neurological:      General: No focal deficit present.      Mental Status: She is alert.   Psychiatric:         Mood and Affect: Mood normal.         Behavior: Behavior normal.         Critical Care  Performed by: Saeed Bush MD  Authorized by: Maria C Andersen MD     Critical care provider statement:     Critical care time (minutes):  35    Critical care was necessary to treat or prevent imminent or life-threatening deterioration of the following conditions:  Circulatory failure, respiratory failure and sepsis    Critical care was time spent personally by me on the  following activities:  Evaluation of patient's response to treatment, examination of patient, re-evaluation of patient's condition, pulse oximetry, ordering and review of radiographic studies, ordering and review of laboratory studies and ordering and performing treatments and interventions              ED Course  ED Course as of 08/02/22 0850   Tue Aug 02, 2022   0710 We have called [facility] and asked for med list.  We have reviewed the records they have sent.  She is on Xarelto but it looks like it is for DVT.  She is not on any rate control medicines.  She is on Bumex 2 mg daily.  Chest x-ray is negative for pneumonia although I think it looks a little bit congested, likely CHF from her rapid A. fib.  We will give IV Lasix.  Awaiting urinalysis.  After 10 mg Cardizem her heart rate is down around 140 but she remains in rapid A. fib.  Blood pressure has maintained so we will give additional Cardizem and initiate drip. [DT]   9731 Upon repeat exam she is able to provide history.  She continues to deny pain.  I am able to get a good abdominal exam and she has no tenderness.  She tells me she just feels awful all over though.  She reports nausea as her main complaint at this time and tells me she feels like she needs to throw up.  She tells me this just started today.  Urine is not impressive.  The source of her sepsis is not entirely evident at this time.  PCR panel is negative for any specific pathogen.  Heart rate is 100 220.  Systolic blood pressure is 110. [DT]   3291 I have conferred with Dr. Lamb who will admit. [DT]      ED Course User Index  [DT] Saeed Bush MD                                           MDM  Number of Diagnoses or Management Options  Acute on chronic diastolic congestive heart failure (HCC): new and requires workup  Atrial fibrillation with rapid ventricular response (HCC): new and requires workup  Sepsis with acute hypoxic respiratory failure without septic shock, due  to unspecified organism (HCC): new and requires workup     Amount and/or Complexity of Data Reviewed  Clinical lab tests: reviewed and ordered  Tests in the radiology section of CPT®: ordered and reviewed  Obtain history from someone other than the patient: yes  Review and summarize past medical records: yes  Discuss the patient with other providers: yes  Independent visualization of images, tracings, or specimens: yes    Critical Care  Total time providing critical care: 30-74 minutes    Patient Progress  Patient progress: improved      Final diagnoses:   Sepsis with acute hypoxic respiratory failure without septic shock, due to unspecified organism (HCC)   Atrial fibrillation with rapid ventricular response (HCC)   Acute on chronic diastolic congestive heart failure (HCC)       ED Disposition  ED Disposition     ED Disposition   Decision to Admit    Condition   --    Comment   Level of Care: Telemetry [5]   Diagnosis: Sepsis with acute hypoxic respiratory failure without septic shock, due to unspecified organism (HCC) [[id number]]               No follow-up provider specified.       Medication List      No changes were made to your prescriptions during this visit.          Saeed Bush MD  22 0850      Electronically signed by Saeed Bush MD at 22 0850          Physician Progress Notes (last 48 hours)      Suzette Burt APRN at 22 0927        Spoke with Charissa (dtr/POMURPHY) on phone at 9:22am who reports she and her sister would like patient to discharge back to [facility] with hospice. Discussed having Hospice call Charissa to discuss. Spoke with Hospice Liaison, CM notified as well.     Electronically signed by Suzette Burt APRN at 22 0932     Danielle Carbone MD at 22 1634              [facility] Medicine Services  PROGRESS NOTE    Patient Name: Rosanna Browne  : 1930  MRN: [id number]    Date of Admission: 2022  Primary Care  Physician: eRd Perez MD    Subjective   Subjective     CC:  Bacteremia    HPI:  Increased respiratory distress this am, not very interactive    ROS:  General: denies fevers or chills  CV: denies chest pain  Resp: Positive for shortness of breath  Abd: denies abd pain, nausea      Objective   Objective     Vital Signs:   Temp:  [97.1 °F (36.2 °C)-98.4 °F (36.9 °C)] 97.9 °F (36.6 °C)  Heart Rate:  [] 100  Resp:  [18-22] 18  BP: (105-141)/(62-98) 113/95  Flow (L/min):  [3] 3     Physical Exam:  Constitutional: Elderly female, appears ill and in mild distress but nontoxic, hard of hearing  Respiratory: Decreased breath sounds bilaterally with wheezing in her upper airway, mildly increased respiratory effort while sleeping  Cardiovascular: RRR, no murmurs, rubs, or gallops  Gastrointestinal: Positive bowel sounds, soft, nontender, nondistended  Musculoskeletal: No bilateral ankle edema  Psychiatric: Appropriate affect, cooperative  Neurologic: Oriented x 3, no focal neurological deficits  Skin: Bilateral venous stasis changes with scabbing, no significant erythema      Results Reviewed:  LAB RESULTS:      Lab 08/08/22  0738 08/07/22  1559 08/05/22  0237 08/04/22  0544 08/03/22  0517 08/03/22  0016 08/02/22  1637 08/02/22  1250 08/02/22  0938 08/02/22  0602   WBC 8.36 6.66 9.84 11.94* 15.54*  --   --   --   --  15.98*   HEMOGLOBIN 12.8 12.4 12.5 12.0 11.7*  --   --   --   --  13.5   HEMATOCRIT 41.8 39.1 41.1 39.1 37.2  --   --   --   --  43.3   PLATELETS 189 160 149 125* 119*  --   --   --   --  137*   NEUTROS ABS 6.11 4.82 8.02* 10.16*  --   --   --   --   --  14.26*   IMMATURE GRANS (ABS) 0.07* 0.06* 0.04 0.05  --   --   --   --   --  0.16*   LYMPHS ABS 1.27 0.76 1.00 0.97  --   --   --   --   --  1.04   MONOS ABS 0.69 0.72 0.58 0.62  --   --   --   --   --  0.46   EOS ABS 0.18 0.26 0.16 0.10  --   --   --   --   --  0.02   MCV 86.4 85.6 87.4 86.5 85.9  --   --   --   --  85.7   CRP  --   --   --   --   --    --   --   --   --  13.77*   PROCALCITONIN  --   --  13.49*  --   --   --   --   --   --  4.82*   LACTATE  --   --  1.1  --   --  1.9 2.3* 3.4* 3.1* 2.9*   D DIMER QUANT  --   --  5.41* 4.62*  --   --   --   --   --   --          Lab 08/08/22  0738 08/07/22  1559 08/05/22 0237 08/04/22 0544 08/03/22  0517 08/02/22  0602   SODIUM 138 134* 133* 131* 137 133*   POTASSIUM 3.9 3.7 3.8 3.5 3.8 4.2   CHLORIDE 100 99 98 97* 98 92*   CO2 29.0 26.0 26.0 23.0 25.0 27.0   ANION GAP 9.0 9.0 9.0 11.0 14.0 14.0   BUN 46* 44* 51* 48* 47* 43*   CREATININE 1.27* 1.11* 1.49* 1.40* 1.58* 1.37*   EGFR 39.8* 46.7* 32.8* 35.4* 30.6* 36.3*   GLUCOSE 114* 103* 112* 122* 110* 250*   CALCIUM 8.1* 8.1* 7.7* 7.4* 7.2* 8.6   MAGNESIUM  --  2.3 2.2 2.2 1.6* 1.5*   PHOSPHORUS  --   --   --   --   --  2.9   HEMOGLOBIN A1C  --   --   --   --  9.00*  --    TSH  --   --   --   --   --  5.410*         Lab 08/05/22  0237 08/04/22  0544 08/02/22  0602   TOTAL PROTEIN 7.0 7.2 8.1   ALBUMIN 3.10* 2.90* 3.70   GLOBULIN 3.9 4.3 4.4   ALT (SGPT) 13 12 13   AST (SGOT) 22 25 30   BILIRUBIN 0.5 0.6 1.4*   ALK PHOS 79 136* 133*   LIPASE  --   --  17         Lab 08/05/22 0237 08/02/22  0602   PROBNP 16,610.0* 9,106.0*   TROPONIN T 0.045* 0.054*         Lab 08/03/22  0517   CHOLESTEROL 82   LDL CHOL 42   HDL CHOL 23*   TRIGLYCERIDES 82             Lab 08/05/22  0209 08/02/22  0704   PH, ARTERIAL 7.277* 7.420   PCO2, ARTERIAL 60.3* 43.8   PO2 ART 94.0 63.8*   FIO2 40 44   HCO3 ART 28.1* 28.4*   BASE EXCESS ART 0.2 3.3*   CARBOXYHEMOGLOBIN 1.0 1.4     Brief Urine Lab Results  (Last result in the past 365 days)      Color   Clarity   Blood   Leuk Est   Nitrite   Protein   CREAT   Urine HCG        08/02/22 0629 Yellow   Cloudy   Moderate (2+)   Moderate (2+)   Negative   >=300 mg/dL (3+)                 Microbiology Results Abnormal     Procedure Component Value - Date/Time    COVID PRE-OP / PRE-PROCEDURE SCREENING ORDER (NO ISOLATION) - Swab, Nasopharynx  [[id number]]  (Normal) Collected: 08/07/22 0940    Lab Status: Final result Specimen: Swab from Nasopharynx Updated: 08/07/22 1020    Narrative:      The following orders were created for panel order COVID PRE-OP / PRE-PROCEDURE SCREENING ORDER (NO ISOLATION) - Swab, Nasopharynx.  Procedure                               Abnormality         Status                     ---------                               -----------         ------                     COVID-19, ABBOTT IN-HOUS...[[id number]]  Normal              Final result                 Please view results for these tests on the individual orders.    COVID-19, ABBOTT IN-HOUSE,NASAL Swab (NO TRANSPORT MEDIA) 2 HR TAT - Swab, Nasopharynx [[id number]]  (Normal) Collected: 08/07/22 0940    Lab Status: Final result Specimen: Swab from Nasopharynx Updated: 08/07/22 1020     COVID19 Presumptive Negative    Narrative:      Fact sheet for providers: https://www.fda.gov/media/217823/download     Fact sheet for patients: https://www.fda.gov/media/122739/download    Test performed by PCR.  If inconsistent with clinical signs and symptoms patient should be tested with different authorized molecular test.    Gastrointestinal Panel, PCR - Stool, Per Rectum [[id number]]  (Normal) Collected: 08/04/22 1127    Lab Status: Final result Specimen: Stool from Per Rectum Updated: 08/04/22 1315     Campylobacter Not Detected     Plesiomonas shigelloides Not Detected     Salmonella Not Detected     Vibrio Not Detected     Vibrio cholerae Not Detected     Yersinia enterocolitica Not Detected     Enteroaggregative E. coli (EAEC) Not Detected     Enteropathogenic E. coli (EPEC) Not Detected     Enterotoxigenic E. coli (ETEC) lt/st Not Detected     Shiga-like toxin-producing E. coli (STEC) stx1/stx2 Not Detected     Shigella/Enteroinvasive E. coli (EIEC) Not Detected     Cryptosporidium Not Detected     Cyclospora cayetanensis Not Detected     Entamoeba histolytica Not Detected     Giardia  lamblia Not Detected     Adenovirus F40/41 Not Detected     Astrovirus Not Detected     Norovirus GI/GII Not Detected     Rotavirus A Not Detected     Sapovirus (I, II, IV or V) Not Detected    COVID PRE-OP / PRE-PROCEDURE SCREENING ORDER (NO ISOLATION) - Swab, Nasopharynx [[id number]]  (Normal) Collected: 08/02/22 0607    Lab Status: Final result Specimen: Swab from Nasopharynx Updated: 08/02/22 0721    Narrative:      The following orders were created for panel order COVID PRE-OP / PRE-PROCEDURE SCREENING ORDER (NO ISOLATION) - Swab, Nasopharynx.  Procedure                               Abnormality         Status                     ---------                               -----------         ------                     Respiratory Panel PCR w/...[[id number]]  Normal              Final result                 Please view results for these tests on the individual orders.    Respiratory Panel PCR w/COVID-19(SARS-CoV-2) BRENTON/ELLA/HAILEE/PAD/COR/MAD/DEANN In-House, NP Swab in UTM/VTM, 3-4 HR TAT - Swab, Nasopharynx [[id number]]  (Normal) Collected: 08/02/22 0607    Lab Status: Final result Specimen: Swab from Nasopharynx Updated: 08/02/22 0721     ADENOVIRUS, PCR Not Detected     Coronavirus 229E Not Detected     Coronavirus HKU1 Not Detected     Coronavirus NL63 Not Detected     Coronavirus OC43 Not Detected     COVID19 Not Detected     Human Metapneumovirus Not Detected     Human Rhinovirus/Enterovirus Not Detected     Influenza A PCR Not Detected     Influenza B PCR Not Detected     Parainfluenza Virus 1 Not Detected     Parainfluenza Virus 2 Not Detected     Parainfluenza Virus 3 Not Detected     Parainfluenza Virus 4 Not Detected     RSV, PCR Not Detected     Bordetella pertussis pcr Not Detected     Bordetella parapertussis PCR Not Detected     Chlamydophila pneumoniae PCR Not Detected     Mycoplasma pneumo by PCR Not Detected    Narrative:      In the setting of a positive respiratory panel with a viral infection PLUS a  negative procalcitonin without other underlying concern for bacterial infection, consider observing off antibiotics or discontinuation of antibiotics and continue supportive care. If the respiratory panel is positive for atypical bacterial infection (Bordetella pertussis, Chlamydophila pneumoniae, or Mycoplasma pneumoniae), consider antibiotic de-escalation to target atypical bacterial infection.          XR Chest 1 View    Result Date: 8/8/2022  DATE OF EXAM: 8/8/2022 9:00 AM  PROCEDURE: XR CHEST 1 VW-  INDICATIONS: chf; A41.9-Sepsis, unspecified organism; R65.20-Severe sepsis without septic shock; J96.01-Acute respiratory failure with hypoxia; I48.91-Unspecified atrial fibrillation; I50.33-Acute on chronic diastolic (congestive) heart failure  COMPARISON: 8/5/2022  TECHNIQUE: Single radiographic AP view of the chest was obtained.  FINDINGS: Heart is enlarged. Vasculature is cephalized. There is increasing hazy opacity of the right hemithorax, which may represent worsening pneumonia. As the current exam is semiupright, this could also represent a layered pleural effusion. Previous 8/2/2022 CT scan did show a moderate effusion. Left lung actually appears improved, with mild, decreasing left midlung airspace disease, and mild left basilar atelectasis or effusion. No pneumothorax is seen. Basilar discoid      Impression:  1. Increasing hazy opacity of the right hemithorax, whether worsening pneumonia, asymmetric edema, or layering of pleural effusion. 2. Mild remaining left midlung airspace disease which appears improved. 3. Persistent mild CHF  This report was finalized on 8/8/2022 10:42 AM by Dr. Rishi Whitehead MD.        Results for orders placed during the hospital encounter of 08/02/22    Adult Transthoracic Echo Complete W/ Cont if Necessary Per Protocol    Interpretation Summary  · The right atrial cavity is moderate to severely dilated.  · There is a small left pleural effusion. There is a small right pleural  effusion.  · Moderate to severe tricuspid valve regurgitation is present.  · Estimated right ventricular systolic pressure from tricuspid regurgitation is markedly elevated (>55 mmHg). Calculated right ventricular systolic pressure from tricuspid regurgitation is 68 mmHg.  · Estimated left ventricular EF = 55% Left ventricular ejection fraction appears to be 51 - 55%. Left ventricular systolic function is normal.  · Moderate to severe pulmonary hypertension is present.  · Systolic and diastolic flattening of the interventricular septum consistent with right ventricle pressure and volume overload.  · The right ventricular cavity is moderately dilated with normal right ventricular wall thickness and severely reduced right ventricular systolic function; abnormal septal motion.  · There is no evidence of pericardial effusion.      I have reviewed the medications:  Scheduled Meds:castor oil-balsam peru, 1 application, Topical, Q12H  cefuroxime, 250 mg, Oral, Q12H  cetirizine, 10 mg, Oral, Daily  insulin detemir, 10 Units, Subcutaneous, Nightly  insulin lispro, 0-7 Units, Subcutaneous, TID AC  lactobacillus acidophilus, 1 capsule, Oral, Daily  levothyroxine, 88 mcg, Oral, Q AM  melatonin, 10 mg, Oral, Nightly  metoprolol tartrate, 25 mg, Oral, BID  montelukast, 10 mg, Oral, Nightly  nystatin, 1 application, Topical, BID  rivaroxaban, 15 mg, Oral, Daily With Dinner  sodium chloride, 10 mL, Intravenous, Q12H  traZODone, 50 mg, Oral, Nightly      Continuous Infusions:dilTIAZem, 5-15 mg/hr, Last Rate: Stopped (08/02/22 2145)      PRN Meds:.•  acetaminophen  •  dextrose  •  dextrose  •  glucagon (human recombinant)  •  HYDROcodone-acetaminophen  •  ipratropium-albuterol  •  ondansetron **OR** ondansetron  •  sodium chloride  •  sodium chloride    Assessment & Plan   Assessment & Plan     Active Hospital Problems    Diagnosis  POA   • **Sepsis due to group B Streptococcus (HCC) [A40.1]  Yes   • Demand ischemia (HCC) [I24.8]   Yes   • Coronary artery disease involving native coronary artery of native heart with angina pectoris (HCC) [I25.119]  Yes   • Permanent atrial fibrillation (HCC) [I48.21]  Yes   • Hypomagnesemia [E83.42]  Yes   • Hypothyroidism [E03.9]  Yes   • CKD (chronic kidney disease) stage 3, GFR 30-59 ml/min (HCC) [N18.30]  Yes   • Pulmonary hypertension (HCC) [I27.20]  Yes   • T2DM (type 2 diabetes mellitus) (HCC) [E11.9]  Yes      Resolved Hospital Problems    Diagnosis Date Resolved POA   • Sepsis with acute hypoxic respiratory failure without septic shock, due to unspecified organism (HCC) [A41.9, R65.20, J96.01] 08/05/2022 Yes   • Elevated serum creatinine [R79.89] 08/05/2022 Yes   • Thrombocytopenia (HCC) [D69.6] 08/05/2022 Yes   • Lactic acidosis [E87.2] 08/05/2022 Yes   • HTN (hypertension) [I10] 08/05/2022 Yes   • HLD (hyperlipidemia) [E78.5] 08/05/2022 Unknown   • Pulmonary hypertension (HCC) [I27.20] 08/05/2022 Yes   • Mitral valve regurgitation [I34.0] 08/05/2022 Yes        Brief Hospital Course to date:  Rosanna Browne is a 89 y.o. female with PMH of HTN, HLD, T2DM on insulin, Hypothyroidism, Severe Pulmonary HTN, MVR, HFpEF, Afib on Xarelto and CKD II presented from [facility] with severe sepsis. Pt also noted to be hypoxic and in Afib with RVR. Febrile with leukocytosis, elevated lactate, elevated procal on presentation. CXr unremarkable. CT chest without contrast unremakable. RVP (including COVID19) negative. GI PCR negative     Severe Sepsis, resolved  Group B Strep Bacteremia  RLE cellulitis, improving  -suspect RLE cellulitis vs urinary source given UA with moderate LE, 3-5 WBCs and trace bacteria   -TTE with no evidence of endocarditis.   -blood Cx with GBS, appears it is in all four bottles sent from admission   -Urine culture growing Proteus, susceptible to cefazolin  -ID consulted.  Patient to continue cefazolin until 8/8 then changed to cefuroxime to 50 mg twice daily until  8/15.     Hypoxia  H/o Severe Pulmonary HTN  Pneumonia  H/o HFpEF  -- pt with noted hypoxia on RA at NH.  Wears supplemental O2 continuously at baseline but unsure of how many liters. Currently stable on 3L  -- CT chest unremarkable  -- s/p IV Lasix in the ED. S/p albumin and Bumex per cards on 8/6  -- TTE with moderate to severe Pulm HTN, right ventricular dysfunction, normal LVEF.   - Additional IV diuresis today and PO starting tmrw. D/w cardiology, poor prognosis overall, palliative consulted. Daughter considering hospice at discharge. Plan for f/u w palliative tmrw AM     Lactic acidosis  - noted on presentation, resolved.      Afib with RVR  -- [id number] of 5, on Xarelto at home- dose adjusted based on renal function  -- continue Metoprolol succinate as BP tolerates     T2DM  -- hold home meds  -- HbA1C 9% c/w poor control at home  -- SSI with low dose basal insulin for now  -- Glucoses well controlled     HTN  -- Normotensive  -- Continue metoprolol     HLD  -- LDL at goal      Elevated creatinine on CKD II  -- unsure of baseline Cr.  Suspect her baseline may be 1.4-1.6 based off of measurements here  -- monitor/renally dose meds  - following BMP     Hypomagnesemia  -- replace per protocol     TCP, resolved  -- mild, now resolved. Likely due to sepsis     Hypothyroidism  -- TSH elevated  --  home dose Synthroid increased from 75 mcg to 88mcg.  Needs repeat TSH in 4-6 weeks           Expected Discharge Location and Transportation: TBD, likely back to NH w hospice  Expected Discharge Date: 8/8/22-8/9    DVT prophylaxis:  Medical DVT prophylaxis orders are present.     AM-PAC 6 Clicks Score (PT): 10 (08/07/22 0800)    CODE STATUS:   Code Status and Medical Interventions:   Ordered at: 08/02/22 1206     Medical Intervention Limits:    NO intubation (DNI)    NO cardioversion    NO dialysis    NO vasopressors    NO artificial nutrition     Level Of Support Discussed With:    Patient     Code Status (Patient has no  pulse and is not breathing):    No CPR (Do Not Attempt to Resuscitate)     Medical Interventions (Patient has pulse or is breathing):    Limited Support     I have prepared this progress note with copied portions of the prior day's progress note of my own authorship to preserve accuracy and maintain consistency of documentation. I have reviewed these portions and edited them for correctness. I verify that the above documentation accurately and truly represents the evaluation and management performed on today's date.       Danielle Carbone MD  08/08/22                Electronically signed by Danielle Carbone MD at 08/08/22 1644     Lakshmi Ozuna APRN at 08/08/22 0916          Cardiology Progress Note      Reason for visit:    · Shortness of breath  · Atrial fibrillation with RVR    IDENTIFICATION: 89-year-old female who resides in [place], [place] at [facility] Problems    Diagnosis  POA   • **Sepsis due to group B Streptococcus (HCC) [A40.1]  Yes     Priority: High   • Demand ischemia (HCC) [I24.8]  Yes     Priority: High   • Permanent atrial fibrillation (HCC) [I48.21]  Yes     Priority: High     · [id number] 6 (age >75, female, HTN, athero, DM)     • CKD (chronic kidney disease) stage 3, GFR 30-59 ml/min (HCC) [N18.30]  Yes     Priority: High   • Pulmonary hypertension (HCC) [I27.20]  Yes     Priority: High     · Echo (8/3/2022): LVEF 55%.  RV pressure/volume overload.  Severe TR.  RVSP 68 mmHg.     • T2DM (type 2 diabetes mellitus) (HCC) [E11.9]  Yes     Priority: High   • Coronary artery disease involving native coronary artery of native heart with angina pectoris (HCC) [I25.119]  Yes     Priority: Medium     · Coronary artery calcification noted on CT chest, 8/2/2022     • Hypomagnesemia [E83.42]  Yes     Priority: Low   • Hypothyroidism [E03.9]  Yes     Priority: Low       Subjective   Patient is lying in the bed on O2 2 L via nasal cannula.  She appears tachypneic.  She is  "not verbal and is sleeping but arouses fairly easily.    She did receive IV Bumex yesterday but has not had a diuretic today.  The patient will shake her head in response to my questions but does not appear to want to open her eyes.  According to the nurse she is verbal when she \"wants to be\"        Objective   Vital Sign Min/Max for last 24 hours  Temp  Min: 96.8 °F (36 °C)  Max: 98.4 °F (36.9 °C)   BP  Min: 109/63  Max: 141/98   Pulse  Min: 86  Max: 147   Resp  Min: 16  Max: 22   SpO2  Min: 92 %  Max: 97 %   Flow (L/min)  Min: 3  Max: 3      Intake/Output Summary (Last 24 hours) at 2022  Last data filed at 2022  Gross per 24 hour   Intake --   Output 550 ml   Net -550 ml           Physical Exam  Cardiovascular:      Rate and Rhythm: Tachycardia present. Rhythm irregularly irregular.   Musculoskeletal:      Right lower le+ Pitting Edema present.      Left lower le+ Pitting Edema present.   Neurological:      Mental Status: She is lethargic.   Psychiatric:         Behavior: Behavior is uncooperative.         Tele: A. fib    Results Review (reviewed the patient's recent labs in the electronic medical record):     EKG (2022): Rate controlled atrial fibrillation    CXR (2022): Patchy opacities in perihilar right lung suspicious for pneumonia.  Trace pleural effusions.  Cardiomegaly    ECHO (8/3/2022): LVEF 55%.  RA moderate to severely dilated.  Small left and right pleural effusions.  Moderate to severe TR with RVSP 68 mmHg.  Moderate to severe pulmonary hypertension.  RV volume overload    Results from last 7 days   Lab Units 22  0738 22  1559 22  0237 22  0544 22  0517 22  0602   SODIUM mmol/L 138 134* 133* 131* 137 133*   POTASSIUM mmol/L 3.9 3.7 3.8 3.5 3.8 4.2   CHLORIDE mmol/L 100 99 98 97* 98 92*   BUN mg/dL 46* 44* 51* 48* 47* 43*   CREATININE mg/dL 1.27* 1.11* 1.49* 1.40* 1.58* 1.37*   MAGNESIUM mg/dL  --  2.3 2.2 2.2 1.6* 1.5*     Results " from last 7 days   Lab Units 08/05/22  0237 08/02/22  0602   TROPONIN T ng/mL 0.045* 0.054*     Results from last 7 days   Lab Units 08/08/22  0738 08/07/22  1559 08/05/22  0237   WBC 10*3/mm3 8.36 6.66 9.84   HEMOGLOBIN g/dL 12.8 12.4 12.5   HEMATOCRIT % 41.8 39.1 41.1   PLATELETS 10*3/mm3 189 160 149       Lab Results   Component Value Date    HGBA1C 9.00 (H) 08/03/2022       Lab Results   Component Value Date    CHOL 82 08/03/2022    TRIG 82 08/03/2022    HDL 23 (L) 08/03/2022    LDL 42 08/03/2022         Assessment   Sepsis from group B streptococcus/right leg cellulitis/pneumonia/UTI  · Infectious disease following.  Suspect right leg is the source.  · Echo showed no vegetations making endocarditis less likely    Acute on chronic right-sided heart failure/severe pulmonary hypertension  · Echo normal LVEF but RV overload from severe pulmonary hypertension  · Bumex 2 mg IV x1       Elevated troponin/demand ischemia  · Troponins elevated but flat in the setting of acute right-sided heart failure and atrial fibrillation with RVR which is consistent with demand ischemia      Atrial fibrillation with RVR/permanent atrial fibrillation  · Likely permanent atrial fibrillation.  · Currently rate controlled.  Anticoagulated with Xarelto 15 mg daily  · Metoprolol tartrate 25 mg twice daily    Coronary artery disease  · Coronary calcification noted on CT of the chest 8/2/2022  · For aspirin due to chronic anticoagulation with Xarelto      Type 2 diabetes mellitus  · Hemoglobin A1c 9    Chronic kidney disease  · Current creatinine 1.27    Hypertension  · Metoprolol tartrate 25 mg twice daily  · Current /65    Hyperlipidemia  · Total cholesterol 82, LDL 42, triglycerides 82  · Defer statin therapy due to lack of clinical efficacy in this age group    Plan     · Give 2 mg IV Bumex x1 today and start Bumex 2 mg p.o. tomorrow  · Recommend goals of care be discussed with patient's daughter who is POA.  Would recommend  palliative/hospice be involved.  Patient is currently DNR/DNI  · Rate control will be difficult given underlying multiple infections.  Continue metoprolol tartrate and Xarelto  · Follow-up with primary cardiologist Dr. Unruly Lemons in 3 weeks after discharge  · Please call cardiology with any further questions    Electronically signed by MONICA Esteves, 08/08/22, 9:16 AM EDT.    Electronically signed by Lakshmi Ozuna APRN at 08/08/22 0955     Maria De Jesus Esparza MD at 08/08/22 0913          [facility] Progress Note    Admission Date: 8/2/2022    Rosanna Browne  5/5/1930  [id number]    Date: 8/8/2022    Antibiotics:  Anti-Infectives (From admission, onward)    Ordered     Dose/Rate Route Frequency Start Stop    08/07/22 2152  ceFAZolin (ANCEF) IVPB 1 g        Ordering Provider: Maria De Jesus Esparza MD    1 g Intravenous Every 12 Hours 08/07/22 2230 08/14/22 2229    08/04/22 1458  cefepime (MAXIPIME) 2 g/100 mL 0.9% NS (mbp)        Ordering Provider: Maria De Jesus Esparza MD    2 g  200 mL/hr over 30 Minutes Intravenous Once 08/04/22 1545 08/04/22 1657    08/02/22 0606  vancomycin 1750 mg/500 mL 0.9% NS IVPB ([facility])        Ordering Provider: Luis Fernando Serrano MD    20 mg/kg × 83.9 kg  over 105 Minutes Intravenous Once 08/02/22 0608 08/02/22 0914    08/02/22 0606  piperacillin-tazobactam (ZOSYN) 3.375 g in iso-osmotic dextrose 50 ml (premix)        Ordering Provider: Luis Fernando Serrano MD    3.375 g  over 30 Minutes Intravenous Once 08/02/22 0608 08/02/22 0723          Reason for Consultation: Grp B strep sepsis     History of present illness:    Patient is a 89 y.o. female with extensive comorbidity including uncontrolled DM, MR, Cor pulmonale, Afib, HFpED and CKDII transferred from [facility] [place] 8/2 with a 5 day history of progressive dyspnea and multiple prior episodes of vomiting. Her daughter is not present at the bedside and some details of her history are inconsistent with history  provided yesterday When asked about her medical history she talks about hospitalizations in the 90's  WBC 15.9, plt 137, BNP 9,106, L.A. 2.9-> 3.4-> 1.9  Procalcitonin 4.8 CRP 13  Creat 1.37-> 1.58   Chest CT with bibasilar atelectasis and pleural effusions     8/4/22 Afebrile, tired but easily awakened, states that she feels stronger and that diarrhea continues. O2 sats high 80's while I was in the room  Her daughter Chelsea is at bedside and expresses concern that she is not receiving a lactose restricted diet. R leg erythema and pain somewhat improved  GNR from urine Venous doppler limited by patient tolerance but negative  8/5/22 Afebrile, increased hypoxemia last pm which improved after a dose of bumetanide;  overall more alert, no stools today so far on lactose restricted diet R leg erythema improving  GI panel negative  Proteus from urine sensitive to cefazolin  8/6/22 Afebrile, alert, denies R leg pain, her main c/o is that she does not like the food  O2 decreased to 3L NC  8/8/22 afebrile, tachycardic yesterday with HR to 145 yesterday O2 sats 92-95%   Denies R leg pain    PE:  Vital Signs  Temp  Min: 96.8 °F (36 °C)  Max: 98.4 °F (36.9 °C)  BP  Min: 109/63  Max: 141/98  Pulse  Min: 86  Max: 147  Resp  Min: 16  Max: 22  SpO2  Min: 92 %  Max: 97 %    GENERAL: lethargic . Cooperative but very hard of hearing  HEENT: Normocephalic, atraumatic.  PERRL. EOMI. No conjunctival injection. No icterus. Oropharynx  without thrush or exudate.     NECK: Supple without nuchal rigidity. No mass.     HEART: irregular rhythm; heart sounds distant  LUNGS: decreased breath sounds posteriorly  ABDOMEN: Soft, nontender, nondistended. Positive bowel sounds. No rebound or guarding.   EXT: right leg erythema and warmth much improved, changes of stasis dermatitis distal legs and impetigo-like wounds right shin   :  Without Urbina catheter.  MSK:  No deformity  SKIN: right leg with erythema, and healed excoriations right shin  NEURO:  nonfocal  PSYCHIATRIC: Normal insight and judgement. Cooperative with PE       Laboratory Data    Results from last 7 days   Lab Units 08/08/22  0738 08/07/22  1559 08/05/22  0237   WBC 10*3/mm3 8.36 6.66 9.84   HEMOGLOBIN g/dL 12.8 12.4 12.5   HEMATOCRIT % 41.8 39.1 41.1   PLATELETS 10*3/mm3 189 160 149     Results from last 7 days   Lab Units 08/08/22  0738   SODIUM mmol/L 138   POTASSIUM mmol/L 3.9   CHLORIDE mmol/L 100   CO2 mmol/L 29.0   BUN mg/dL 46*   CREATININE mg/dL 1.27*   GLUCOSE mg/dL 114*   CALCIUM mg/dL 8.1*     Results from last 7 days   Lab Units 08/05/22  0237   ALK PHOS U/L 79   BILIRUBIN mg/dL 0.5   ALT (SGPT) U/L 13   AST (SGOT) U/L 22         Results from last 7 days   Lab Units 08/02/22  0602   CRP mg/dL 13.77*       Estimated Creatinine Clearance: 32.3 mL/min (A) (by C-G formula based on SCr of 1.27 mg/dL (H)).      Microbiology:  Grp B strep from blood  proteus from urine S to cefazolin, R to amp/sul, quinolones, nitrofurantoin and T/S    Radiology:  Imaging Results (Last 72 Hours)     Procedure Component Value Units Date/Time    XR Chest 1 View [[id number]] Resulted: 08/08/22 0900     Updated: 08/08/22 0900          I personally reviewed the radiographic studies       Impression:      -- Septic shock with hypotension, lactic acidosis, LEAH - improved  -- Grp B strep sepsis  Suspect that the right leg is the source  TTE showed no vegetations and she promptly responded to antimicrobial therapy, making endocarditis less likely  -- Proteus uti- resistant organism to most oral antibiotics with the exception of 2nd and 3rd generation cephalosporins  -- Diarrhea-  Improved  -- Atrial fibrillation on xarelto  -- Acute on chronic respiratory failure Bilateral pleural effusions and BNP 9,106  -- Pulmonary HTN  -- DM T2- uncontrolled A1c 9  -- Mitral regurgitation  -- HFpEF  -- Acute on chronic kidney disease  -- recent Nausea and Vomiting- evidently multiple residents had these symptoms  No evidence of  "aspiration pneumonia by CT     PLAN/RECOMMENDATIONS:   Thank you for asking us to see Rosanna NAMRATA Pavan, I recommend the following:     -- Change to cefuroxime 250 mg bid to 8/15 for treatment of leg cellulitis and uti  -- check cxr to assess for CHF and potential benefit of further diuresis  -- Lactate tablets at bedside  I have asked RN to remind her to take them at meal times  --  lactose restricted diet  -- probiotic while on antibiotics           Prognosis guarded/poor         Maria De Jesus Esparza MD  8/8/2022          Electronically signed by Maria De Jesus Esparza MD at 08/08/22 0940     Indy Weber PA at 08/07/22 1621     Attestation signed by José Miguel Carvajal MD at 08/08/22 1716    I have reviewed this documentation and agree.                  [facility] at [facility]  Progress Note       LOS: 5 days   Patient Care Team:  Red Perez MD as PCP - General (Family Medicine)  Unruly Kaplan MD as Consulting Physician (Cardiology)    Chief Complaint:  Follow up SOB    Subjective      Patient states she feels \"bad\" today. She was resting when I visited and I woke her up. She continues on 3L with O2 sats 96%. She is tired. She is SOB.     Interval History:     89-year-old female admitted for sepsis.  Patient with likely permanent atrial fibrillation with rapid ventricular response in the setting of illness.  Patient has chronic hypoxic respiratory failure and has been wearing home O2.  The patient's primary cardiologist, Unruly Kaplan has told her that she has a bad valve.  Echo this admission shows severe tricuspid regurgitation and severe pulmonary hypertension    Review of Systems:   Pertinent positives in HPI, all others reviewed and negative.      Objective       Current Facility-Administered Medications:   •  acetaminophen (TYLENOL) tablet 650 mg, 650 mg, Oral, Q6H PRN, Maria C Andersen MD, 650 mg at 08/06/22 6390  •  bumetanide (BUMEX) injection 2 mg, 2 mg, " Intravenous, Once, Indy Weber PA  •  castor oil-balsam peru (VENELEX) ointment 1 application, 1 application, Topical, Q12H, Maria C Andersen MD, 1 application at 08/07/22 0926  •  ceFAZolin (ANCEF) IVPB 1 g, 1 g, Intravenous, Q12H, Maria De Jesus Esparza MD, 1 g at 08/07/22 0323  •  cetirizine (zyrTEC) tablet 10 mg, 10 mg, Oral, Daily, Maria C Andersen MD, 10 mg at 08/07/22 0928  •  dextrose (D50W) (25 g/50 mL) IV injection 25 g, 25 g, Intravenous, Q15 Min PRN, Maria C Andersen MD  •  dextrose (GLUTOSE) oral gel 15 g, 15 g, Oral, Q15 Min PRN, Maria C Andersen MD  •  dilTIAZem (CARDIZEM) 125 mg in 125 mL 0.7% sodium chloride  infusion, 5-15 mg/hr, Intravenous, Titrated, Saeed Bush MD, Stopped at 08/02/22 2145  •  glucagon (human recombinant) (GLUCAGEN DIAGNOSTIC) injection 1 mg, 1 mg, Intramuscular, Q15 Min PRN, Maria C Andersen MD  •  HYDROcodone-acetaminophen (NORCO) 5-325 MG per tablet 1 tablet, 1 tablet, Oral, Q4H PRN, Maria C Andersen MD  •  insulin detemir (LEVEMIR) injection 10 Units, 10 Units, Subcutaneous, Nightly, Maria C Andersen MD, 10 Units at 08/06/22 2202  •  Insulin Lispro (humaLOG) injection 0-7 Units, 0-7 Units, Subcutaneous, TID AC, Maria C Andersen MD, 2 Units at 08/06/22 1839  •  ipratropium-albuterol (DUO-NEB) nebulizer solution 3 mL, 3 mL, Nebulization, Q4H PRN, Maria C Andersen MD, 3 mL at 08/06/22 3114  •  lactobacillus acidophilus (RISAQUAD) capsule 1 capsule, 1 capsule, Oral, Daily, Vilma Chapman MD, 1 capsule at 08/07/22 0926  •  levothyroxine (SYNTHROID, LEVOTHROID) tablet 88 mcg, 88 mcg, Oral, Q AM, Maria C Andersen MD, 88 mcg at 08/07/22 0504  •  melatonin tablet 10 mg, 10 mg, Oral, Nightly, Maria C Andersen MD, 10 mg at 08/06/22 2201  •  metoprolol tartrate (LOPRESSOR) tablet 25 mg, 25 mg, Oral, BID, Indy Weber PA  •  montelukast (SINGULAIR) tablet 10 mg, 10 mg, Oral, Nightly, Maria C Andersen,  "MD, 10 mg at 08/06/22 2201  •  nystatin (MYCOSTATIN) powder 1 application, 1 application, Topical, BID, Maria C Andersen MD, 1 application at 08/07/22 0926  •  ondansetron (ZOFRAN) tablet 4 mg, 4 mg, Oral, Q6H PRN **OR** ondansetron (ZOFRAN) injection 4 mg, 4 mg, Intravenous, Q6H PRN, Maria C Andersen MD  •  rivaroxaban (XARELTO) tablet 15 mg, 15 mg, Oral, Daily With Dinner, Maria C Andersen MD, 15 mg at 08/06/22 1840  •  sodium chloride 0.9 % flush 1-10 mL, 1-10 mL, Intravenous, PRN, Maria C Andersen MD  •  sodium chloride 0.9 % flush 10 mL, 10 mL, Intravenous, PRN, Saeed Bush MD  •  sodium chloride 0.9 % flush 10 mL, 10 mL, Intravenous, Q12H, Maria C Andersen MD, 10 mL at 08/07/22 0927  •  traZODone (DESYREL) tablet 50 mg, 50 mg, Oral, Nightly, Maria C Andersen MD, 50 mg at 08/06/22 2201    Vital Sign Min/Max for last 24 hours  Temp  Min: 96.8 °F (36 °C)  Max: 98.3 °F (36.8 °C)   BP  Min: 113/94  Max: 139/89   Pulse  Min: 86  Max: 120   Resp  Min: 16  Max: 24   SpO2  Min: 88 %  Max: 97 %   Flow (L/min)  Min: 3  Max: 3   No data recorded     Flowsheet Rows    Flowsheet Row First Filed Value   Admission Height 167.6 cm (66\") Documented at 08/02/2022 0551   Admission Weight 83.9 kg (185 lb) Documented at 08/02/2022 0551            Intake/Output Summary (Last 24 hours) at 8/7/2022 1633  Last data filed at 8/7/2022 0000  Gross per 24 hour   Intake 525 ml   Output 400 ml   Net 125 ml       Physical Exam:     General Appearance:    Alert, cooperative, in no acute distress   Lungs:     Clear to auscultation anteriorly, fair effort.     Heart:    Irr, irr, tachycardic,  normal S1 and S2, no            murmur, no gallop, no rub, no click   Chest Wall:    No abnormalities observed   Abdomen:     Normal bowel sounds, no masses, no organomegaly, soft        non-tender, non-distended, no guarding, no rebound                tenderness   Extremities:   Moves all extremities well, no " edema, no cyanosis, no             redness   Pulses:   Pulses palpable and equal bilaterally   Skin:   No bleeding, bruising or rash        Results Review:   Results from last 7 days   Lab Units 08/07/22  1559 08/05/22  0237 08/04/22  0544   WBC 10*3/mm3 6.66 9.84 11.94*   HEMOGLOBIN g/dL 12.4 12.5 12.0   HEMATOCRIT % 39.1 41.1 39.1   PLATELETS 10*3/mm3 160 149 125*     Results from last 7 days   Lab Units 08/05/22  0237 08/04/22  0544 08/03/22  0517   SODIUM mmol/L 133* 131* 137   POTASSIUM mmol/L 3.8 3.5 3.8   CHLORIDE mmol/L 98 97* 98   CO2 mmol/L 26.0 23.0 25.0   BUN mg/dL 51* 48* 47*   CREATININE mg/dL 1.49* 1.40* 1.58*   GLUCOSE mg/dL 112* 122* 110*      Results from last 7 days   Lab Units 08/03/22  0517   HEMOGLOBIN A1C % 9.00*     Results from last 7 days   Lab Units 08/03/22  0517   CHOLESTEROL mg/dL 82   TRIGLYCERIDES mg/dL 82   HDL CHOL mg/dL 23*     Results from last 7 days   Lab Units 08/02/22  0602   TSH uIU/mL 5.410*   FREE T4 ng/dL 1.38     Results from last 7 days   Lab Units 08/05/22  0237   PROBNP pg/mL 16,610.0*         Results from last 7 days   Lab Units 08/05/22  0237 08/02/22  0602   TROPONIN T ng/mL 0.045* 0.054*       Intake/Output Summary (Last 24 hours) at 8/7/2022 1633  Last data filed at 8/7/2022 0000  Gross per 24 hour   Intake 525 ml   Output 400 ml   Net 125 ml       I personally viewed and interpreted the patient's EKG/Telemetry data    EKG: none for review today.   Telemetry: atrial fibrillation with RVR     Ejection Fraction  No results found for: EF    Echo EF Estimated  Lab Results   Component Value Date    ECHOEFEST 55 08/03/2022         Present on Admission:  • (Resolved) Sepsis with acute hypoxic respiratory failure without septic shock, due to unspecified organism (HCC)  • Permanent atrial fibrillation (HCC)  • (Resolved) Elevated serum creatinine  • (Resolved) Thrombocytopenia (HCC)  • (Resolved) Lactic acidosis  • Hypomagnesemia  • (Resolved) HTN (hypertension)  •  Hypothyroidism  • CKD (chronic kidney disease) stage 3, GFR 30-59 ml/min ([facility])  • Pulmonary hypertension (HCC)  • (Resolved) Pulmonary hypertension (HCC)  • (Resolved) Mitral valve regurgitation  • T2DM (type 2 diabetes mellitus) ([facility])  • Sepsis due to group B Streptococcus (HCC)  • Coronary artery disease involving native coronary artery of native heart with angina pectoris ([facility])    Assessment & Plan   1. Permanent Atrial fibrillation  a. CHADSVASC = 5  HAS-BLED = 3 on Xarelto renal dose  b. Echo 8/3/22: moderate to severely dilated RA, moderate to severe TR, RVSP 68 mmHg, EF 51-55%, RV moderately dilated, reduced right systolic function  c. Rates elevated, will increase Metoprolol to 25 mg BID as BP appears to have increased and will tolerate. Although may be difficult to control rates with sepsis.  2. Moderate to severe TR  3. Severe pulmonary hypertension  4. History of mitral valve repair- data deficit  5.  Sepsis  UTI  a. Per medicine and ID   6. Acute on chronic respiratory failure  Right sided heart failure  a. She has chronic hypoxia for which she reports being on continuous home O2 at unknown liters  b. Echo: normal LVEF, reduced right systolic heart failure  c. Chronic O2 at home. Has been stable on 3L  d. Will give another dose of Bumex 2 mg IV x 1 if Cr is stable on BMP today. Still not resulted today. Discussed with RN  7. Hypertension  8. Hyperlipidemia  9. Type II diabetes, insulin dependent  10. Hypothyroidism  11. Chronic kidney disease  12. DNI/No CPR/No cardioversion/No vasopressors        Electronically signed by JA Slater, 08/07/22, 4:22 PM EDT.          Electronically signed by José Miguel Carvajal MD at 08/08/22 3633          Consult Notes (last 48 hours)      Suzette Burt, APRN at 08/08/22 1514      Consult Orders    1. Inpatient Palliative Care MD Consult [[id number]] ordered by Danielle Carbone MD at 08/08/22 1450               Palliative Care Initial Consult   Attending  Physician: Danielle Carbone MD  Referring Provider: Dr. Danielle Carbone    Reason for Referral:  assistance with clarification of goals of care    Code Status:   Code Status and Medical Interventions:   Ordered at: 08/02/22 1206     Medical Intervention Limits:    NO intubation (DNI)    NO cardioversion    NO dialysis    NO vasopressors    NO artificial nutrition     Level Of Support Discussed With:    Patient     Code Status (Patient has no pulse and is not breathing):    No CPR (Do Not Attempt to Resuscitate)     Medical Interventions (Patient has pulse or is breathing):    Limited Support      Advanced Directives: EMS DNR  Family/Support: Chelsea Luna (dtr), Charissa Carvajal  Goals of Care: TBD.    HPI: Rosanna Hannah is a 89 y.o. female with PMH significant for HTN, HLD, D9SY-HS, hypothyroidism, severe pulmonary HTN, MVR, HFpEF, A-fib on Xarelto and CKD II. Patient presented from [facility] on 8/2 with severe sepsis, febrile, leukocytosis, elevated lactate, procal elevated, on Vanc/Zosyn. On continuous Oxygen at baseline, patient does not remember how many L per NC, with acute hypoxic respiratory failure and required NRB, currently on 2LNC, Cr elevated. CR of chest showed coronary calcification, A-fib rate controlled and anticoagulated, ECHO with normal LVEF but RV overload from severe pulmonary HTN, right leg with cellulitis group B streptococcus, proteus UTI. Cardiology and ID following with recommendation for palliative/hospice involvement. [place] [facility] [place]. Palliative Care consulted for GOC in the context of complex medical decision making.  Patient [place] but alert and oriented to self, location, and year. Patient reports being tired of coming back to the hospital with frequent hospitalizations and would prefer to avoid hospitalizations. She reports not wanting PT and would prefer to focus on her comfort/quality of life. Patient reports that her daughter Charissa is her medical  "POA. Patient reports that she lives at [facility] and has used a wheelchair and cane for the past 7 years. Patient with one dose Tylenol 650mg PO in last 24 hours, no prn Norco.     ROS: +SOA, currently on 2LNC. +pain, bilateral lower extremities, due to cellulitis. Patient denies N/V/D, constipation, anxiety. All other ROS negative.        Past Medical History:   Diagnosis Date   • A-fib (HCC)    • CHF (congestive heart failure) (HCC)    • CKD (chronic kidney disease), stage II    • Diabetes (HCC)    • HLD (hyperlipidemia)    • Hypertension    • Pulmonary hypertension (HCC)    • Thyroid disease      Past Surgical History:   Procedure Laterality Date   • CHOLECYSTECTOMY     • HIP ARTHROPLASTY     • TUBAL ABDOMINAL LIGATION       Social History     Socioeconomic History   • Marital status:    Tobacco Use   • Smoking status: Former Smoker     Types: Cigarettes   • Smokeless tobacco: Never Used   Substance and Sexual Activity   • Alcohol use: Never   • Drug use: Never     History reviewed. No pertinent family history.    Allergies   Allergen Reactions   • Lisinopril Unknown - High Severity       Current medication reviewed for route, type, dose and frequency and are current per MAR at time of dictation.    Palliative Performance Scale Score:  40%    /75 (BP Location: Right arm, Patient Position: Lying)   Pulse (!) 124   Temp 97.1 °F (36.2 °C) (Oral)   Resp 18   Ht 167.6 cm (66\")   Wt 91.9 kg (202 lb 8 oz)   SpO2 93%   BMI 32.68 kg/m²     Intake/Output Summary (Last 24 hours) at 8/8/2022 1514  Last data filed at 8/7/2022 2011  Gross per 24 hour   Intake --   Output 550 ml   Net -550 ml       Physical Exam:    General Appearance:    Patient laying in bed, awake, alert, cooperative, NAD, [place]   HEENT:    NC/AT, EOMI, anicteric, MM dry, face relaxed   Neck:   supple, trachea midline, no JVD   Lungs:     CTA bilat, diminished in bases; respirations regular, even and unlabored; RR 18-24 on " exam, on 2LNC    Heart:    RRR, normal S1 and S2, no M/R/G,  on monitor   Abdomen:     Normal bowel sounds, soft, non-tender, non-distended   G/U:   Purewick with straw colored urine out   MSK/Extremities:   Wasting, +1 edema to BLE's   Pulses:   Pulses palpable and equal bilaterally   Skin:   Warm, dry, cellulitis to BLE's   Neurologic:   A/Ox3, cooperative, ERNST   Psych:   Calm, appropriate         Labs:   Results from last 7 days   Lab Units 08/08/22  0738   WBC 10*3/mm3 8.36   HEMOGLOBIN g/dL 12.8   HEMATOCRIT % 41.8   PLATELETS 10*3/mm3 189     Results from last 7 days   Lab Units 08/08/22  0738   SODIUM mmol/L 138   POTASSIUM mmol/L 3.9   CHLORIDE mmol/L 100   CO2 mmol/L 29.0   BUN mg/dL 46*   CREATININE mg/dL 1.27*   GLUCOSE mg/dL 114*   CALCIUM mg/dL 8.1*     Results from last 7 days   Lab Units 08/08/22  0738 08/07/22  1559 08/05/22  0237   SODIUM mmol/L 138   < > 133*   POTASSIUM mmol/L 3.9   < > 3.8   CHLORIDE mmol/L 100   < > 98   CO2 mmol/L 29.0   < > 26.0   BUN mg/dL 46*   < > 51*   CREATININE mg/dL 1.27*   < > 1.49*   CALCIUM mg/dL 8.1*   < > 7.7*   BILIRUBIN mg/dL  --   --  0.5   ALK PHOS U/L  --   --  79   ALT (SGPT) U/L  --   --  13   AST (SGOT) U/L  --   --  22   GLUCOSE mg/dL 114*   < > 112*    < > = values in this interval not displayed.     Imaging Results (Last 72 Hours)     Procedure Component Value Units Date/Time    XR Chest 1 View [[id number]] Collected: 08/08/22 1040     Updated: 08/08/22 1045    Narrative:      DATE OF EXAM: 8/8/2022 9:00 AM     PROCEDURE: XR CHEST 1 VW-     INDICATIONS: chf; A41.9-Sepsis, unspecified organism; R65.20-Severe  sepsis without septic shock; J96.01-Acute respiratory failure with  hypoxia; I48.91-Unspecified atrial fibrillation; I50.33-Acute on chronic  diastolic (congestive) heart failure     COMPARISON: 8/5/2022     TECHNIQUE: Single radiographic AP view of the chest was obtained.     FINDINGS:  Heart is enlarged. Vasculature is cephalized. There is  increasing hazy  opacity of the right hemithorax, which may represent worsening  pneumonia. As the current exam is semiupright, this could also represent  a layered pleural effusion. Previous 8/2/2022 CT scan did show a  moderate effusion. Left lung actually appears improved, with mild,  decreasing left midlung airspace disease, and mild left basilar  atelectasis or effusion. No pneumothorax is seen. Basilar discoid        Impression:         1. Increasing hazy opacity of the right hemithorax, whether worsening  pneumonia, asymmetric edema, or layering of pleural effusion.  2. Mild remaining left midlung airspace disease which appears improved.  3. Persistent mild CHF     This report was finalized on 8/8/2022 10:42 AM by Dr. Rishi Whitehead MD.               Lab 08/03/22  0517   HEMOGLOBIN A1C 9.00*         Diagnostics: Reviewed    A:   Patient Active Problem List   Diagnosis   • Permanent atrial fibrillation (HCC)   • Hypomagnesemia   • Hypothyroidism   • CKD (chronic kidney disease) stage 3, GFR 30-59 ml/min (HCC)   • Pulmonary hypertension (HCC)   • T2DM (type 2 diabetes mellitus) (HCC)   • Sepsis due to group B Streptococcus (HCC)   • Coronary artery disease involving native coronary artery of native heart with angina pectoris (HCC)   • Demand ischemia (HCC)     89 y.o. female with cellulitis, sepsis, pneumonia, UTI, CHF, A-fib.    S/S:   1. Pain -bilateral lower extremities, cellulitis, infective process, MSK  -continue Tylenol 650mg PO q 4 hours prn pain    2. Dyspnea -currently on 2LNC  -recommend starting Morphine 5mg PO q 4 hours prn dyspnea/pain pending GOC    3. Debility -PT/OT recommending SNF with rehab    4. Decreased appetite -offer supplements/Boost/Ensure    5. GOC -DNR/DNI per review of records  -discussed Full Treatment with return to hospital, Palliative to follow at SNF with rehab versus comfort focused treatment plan with hospice to follow at [facility] with patient and daughter Charissa on  phone  -hospice referral placed for daughter Charissa to receive further information regarding outpatient hospice  -palliative to follow if patient discharges to [facility] for re[facility] then transition to LTC    P: Introduced Palliative Care and services to patient and later to daughter on phone.  Please see above for patient assessment and medication recommendations. Spoke with Charissa on the phone who reports she is the medical POA but discussed everything with her sister. Lengthy discussion regarding continued Full Treatment with [place] [facility] [place] versus comfort focused plan of care with [place] [facility] [place]. Daughter would like more information regarding hospice, referral placed. Palliative will follow if patient is [place] [facility] [place] and potential for transition to hospice. All questions and concerns addressed. Daughter to follow up with Palliative Care tomorrow if any further questions and if would like for transition to hospice at discharge versus after rehab.    Thank you for this consult and allowing us to participate in patient's plan of care. Palliative Care Team will continue to follow patient. Please do not hesitate to contact us regarding further symptom management or goals of care needs.  Time: 60 minutes spent reviewing medical and medication records, assessing and examining patient, discussing with family, answering questions, providing some guidance about a plan and documentation of care, and coordinating care with other healthcare members, with > 50% time spent face to face.         MONICA Sahu  8/8/2022      Electronically signed by Suzette Burt APRN at 08/08/22 3522

## 2022-08-09 NOTE — PLAN OF CARE
"Goal Outcome Evaluation:  Plan of Care Reviewed With: patient        Progress: no change    Problem: Adult Inpatient Plan of Care  Goal: Plan of Care Review  Flowsheets (Taken 8/9/2022 1354)  Progress: no change  Plan of Care Reviewed With: patient  Outcome Evaluation: new palliative consult for assistance with GOC per Dr. Carbone.  MONICA Rizo saw pt. yesterday and called daughter Charissa to discuss code status and GOC.  Plan for pt. to return to [facility] [place] at 1345 via [facility] ambulance with hospice services.  Pt. was lying in bed this morning asleep but easily woke to voice.  Pt. was mildly soa at rest on 3LNC.  Pt. did not demonstrate any visible signs of discomfort aside from being mildly soa.  Pt. very confused and stated she needed \"cleaned up before she goes to bed\".  Pt. drifted back to sleep before visit concluded.  Palliative care to follow while inpatient for support and POC.    1330 Palliative IDT meeting: MADELEINE Dye RN, PN; MONICA Rizo; OLAMIDE Mir RN; STUART Perry MDiv, [facility]; OLAMIDE Samuels RN; AMPARO ValdezW, [facility]     Problem: Palliative Care  Goal: Enhanced Quality of Life  Intervention: Promote Advance Care Planning  Flowsheets (Taken 8/9/2022 1354)  Life Transition/Adjustment:   palliative care discussed   palliative care initiated     "

## 2023-01-22 NOTE — PROGRESS NOTES
[facility] Progress Note    Admission Date: 8/2/2022    Rosanna Browne  5/5/1930  [id number]    Date: 8/8/2022    Antibiotics:  Anti-Infectives (From admission, onward)    Ordered     Dose/Rate Route Frequency Start Stop    08/07/22 2152  ceFAZolin (ANCEF) IVPB 1 g        Ordering Provider: Maria De Jesus Esparza MD    1 g Intravenous Every 12 Hours 08/07/22 2230 08/14/22 2229    08/04/22 1458  cefepime (MAXIPIME) 2 g/100 mL 0.9% NS (mbp)        Ordering Provider: Maria De Jesus Esparza MD    2 g  200 mL/hr over 30 Minutes Intravenous Once 08/04/22 1545 08/04/22 1657    08/02/22 0606  vancomycin 1750 mg/500 mL 0.9% NS IVPB ([facility])        Ordering Provider: Luis Fernando Serrano MD    20 mg/kg × 83.9 kg  over 105 Minutes Intravenous Once 08/02/22 0608 08/02/22 0914    08/02/22 0606  piperacillin-tazobactam (ZOSYN) 3.375 g in iso-osmotic dextrose 50 ml (premix)        Ordering Provider: Luis Fernando Serrano MD    3.375 g  over 30 Minutes Intravenous Once 08/02/22 0608 08/02/22 0723          Reason for Consultation: Grp B strep sepsis     History of present illness:    Patient is a 89 y.o. female with extensive comorbidity including uncontrolled DM, MR, Cor pulmonale, Afib, HFpED and CKDII transferred from [facility] 8/2 with a 5 day history of progressive dyspnea and multiple prior episodes of vomiting. Her daughter is not present at the bedside and some details of her history are inconsistent with history provided yesterday When asked about her medical history she talks about hospitalizations in the 90's  WBC 15.9, plt 137, BNP 9,106, L.A. 2.9-> 3.4-> 1.9  Procalcitonin 4.8 CRP 13  Creat 1.37-> 1.58   Chest CT with bibasilar atelectasis and pleural effusions     8/4/22 Afebrile, tired but easily awakened, states that she feels stronger and that diarrhea continues. O2 sats high 80's while I was in the room  Her daughter Chelsea is at bedside and expresses concern that she is not receiving a lactose restricted  diet. R leg erythema and pain somewhat improved  GNR from urine Venous doppler limited by patient tolerance but negative  8/5/22 Afebrile, increased hypoxemia last pm which improved after a dose of bumetanide;  overall more alert, no stools today so far on lactose restricted diet R leg erythema improving  GI panel negative  Proteus from urine sensitive to cefazolin  8/6/22 Afebrile, alert, denies R leg pain, her main c/o is that she does not like the food  O2 decreased to 3L NC  8/8/22 afebrile, tachycardic yesterday with HR to 145 yesterday O2 sats 92-95%   Denies R leg pain    PE:  Vital Signs  Temp  Min: 96.8 °F (36 °C)  Max: 98.4 °F (36.9 °C)  BP  Min: 109/63  Max: 141/98  Pulse  Min: 86  Max: 147  Resp  Min: 16  Max: 22  SpO2  Min: 92 %  Max: 97 %    GENERAL: lethargic . Cooperative but very hard of hearing  HEENT: Normocephalic, atraumatic.  PERRL. EOMI. No conjunctival injection. No icterus. Oropharynx  without thrush or exudate.     NECK: Supple without nuchal rigidity. No mass.     HEART: irregular rhythm; heart sounds distant  LUNGS: decreased breath sounds posteriorly  ABDOMEN: Soft, nontender, nondistended. Positive bowel sounds. No rebound or guarding.   EXT: right leg erythema and warmth much improved, changes of stasis dermatitis distal legs and impetigo-like wounds right shin   :  Without Urbina catheter.  MSK:  No deformity  SKIN: right leg with erythema, and healed excoriations right shin  NEURO: nonfocal  PSYCHIATRIC: Normal insight and judgement. Cooperative with PE       Laboratory Data    Results from last 7 days   Lab Units 08/08/22  0738 08/07/22  1559 08/05/22  0237   WBC 10*3/mm3 8.36 6.66 9.84   HEMOGLOBIN g/dL 12.8 12.4 12.5   HEMATOCRIT % 41.8 39.1 41.1   PLATELETS 10*3/mm3 189 160 149     Results from last 7 days   Lab Units 08/08/22  0738   SODIUM mmol/L 138   POTASSIUM mmol/L 3.9   CHLORIDE mmol/L 100   CO2 mmol/L 29.0   BUN mg/dL 46*   CREATININE mg/dL 1.27*   GLUCOSE mg/dL 114*    CALCIUM mg/dL 8.1*     Results from last 7 days   Lab Units 08/05/22  0237   ALK PHOS U/L 79   BILIRUBIN mg/dL 0.5   ALT (SGPT) U/L 13   AST (SGOT) U/L 22         Results from last 7 days   Lab Units 08/02/22  0602   CRP mg/dL 13.77*       Estimated Creatinine Clearance: 32.3 mL/min (A) (by C-G formula based on SCr of 1.27 mg/dL (H)).      Microbiology:  Grp B strep from blood  proteus from urine S to cefazolin, R to amp/sul, quinolones, nitrofurantoin and T/S    Radiology:  Imaging Results (Last 72 Hours)     Procedure Component Value Units Date/Time    XR Chest 1 View [[id number]] Resulted: 08/08/22 0900     Updated: 08/08/22 0900          I personally reviewed the radiographic studies       Impression:      -- Septic shock with hypotension, lactic acidosis, LEAH - improved  -- Grp B strep sepsis  Suspect that the right leg is the source  TTE showed no vegetations and she promptly responded to antimicrobial therapy, making endocarditis less likely  -- Proteus uti- resistant organism to most oral antibiotics with the exception of 2nd and 3rd generation cephalosporins  -- Diarrhea-  Improved  -- Atrial fibrillation on xarelto  -- Acute on chronic respiratory failure Bilateral pleural effusions and BNP 9,106  -- Pulmonary HTN  -- DM T2- uncontrolled A1c 9  -- Mitral regurgitation  -- HFpEF  -- Acute on chronic kidney disease  -- recent Nausea and Vomiting- evidently multiple residents had these symptoms  No evidence of aspiration pneumonia by CT     PLAN/RECOMMENDATIONS:   Thank you for asking us to see Rosanna Browne, I recommend the following:     -- Change to cefuroxime 250 mg bid to 8/15 for treatment of leg cellulitis and uti  -- check cxr to assess for CHF and potential benefit of further diuresis  -- Lactate tablets at bedside  I have asked RN to remind her to take them at meal times  --  lactose restricted diet  -- probiotic while on antibiotics           Prognosis guarded/poor         Maria De Jesus SALVADOR  MD Isaias  8/8/2022         Yes